# Patient Record
Sex: FEMALE | Race: BLACK OR AFRICAN AMERICAN
[De-identification: names, ages, dates, MRNs, and addresses within clinical notes are randomized per-mention and may not be internally consistent; named-entity substitution may affect disease eponyms.]

---

## 2018-03-19 ENCOUNTER — HOSPITAL ENCOUNTER (INPATIENT)
Dept: HOSPITAL 45 - C.EDB | Age: 63
LOS: 15 days | Discharge: NURSING, REHAB OR LONG-TERM CARE | DRG: 871 | End: 2018-04-03
Attending: FAMILY MEDICINE | Admitting: HOSPITALIST
Payer: COMMERCIAL

## 2018-03-19 VITALS
WEIGHT: 105.16 LBS | BODY MASS INDEX: 17.95 KG/M2 | HEIGHT: 64 IN | WEIGHT: 105.16 LBS | BODY MASS INDEX: 17.95 KG/M2 | BODY MASS INDEX: 17.95 KG/M2 | BODY MASS INDEX: 17.95 KG/M2 | HEIGHT: 64 IN

## 2018-03-19 DIAGNOSIS — I63.9: ICD-10-CM

## 2018-03-19 DIAGNOSIS — D64.9: ICD-10-CM

## 2018-03-19 DIAGNOSIS — E55.9: ICD-10-CM

## 2018-03-19 DIAGNOSIS — I24.8: ICD-10-CM

## 2018-03-19 DIAGNOSIS — G93.41: ICD-10-CM

## 2018-03-19 DIAGNOSIS — A41.51: Primary | ICD-10-CM

## 2018-03-19 DIAGNOSIS — N39.0: ICD-10-CM

## 2018-03-19 DIAGNOSIS — K74.60: ICD-10-CM

## 2018-03-19 DIAGNOSIS — R73.9: ICD-10-CM

## 2018-03-19 DIAGNOSIS — I48.0: ICD-10-CM

## 2018-03-19 DIAGNOSIS — A41.89: ICD-10-CM

## 2018-03-19 DIAGNOSIS — R91.1: ICD-10-CM

## 2018-03-19 DIAGNOSIS — E87.2: ICD-10-CM

## 2018-03-19 DIAGNOSIS — L03.116: ICD-10-CM

## 2018-03-19 DIAGNOSIS — D69.59: ICD-10-CM

## 2018-03-19 DIAGNOSIS — R18.8: ICD-10-CM

## 2018-03-19 DIAGNOSIS — E16.2: ICD-10-CM

## 2018-03-19 DIAGNOSIS — R65.21: ICD-10-CM

## 2018-03-19 DIAGNOSIS — E05.90: ICD-10-CM

## 2018-03-19 DIAGNOSIS — J90: ICD-10-CM

## 2018-03-19 DIAGNOSIS — R56.9: ICD-10-CM

## 2018-03-19 DIAGNOSIS — I42.9: ICD-10-CM

## 2018-03-19 DIAGNOSIS — N17.0: ICD-10-CM

## 2018-03-19 DIAGNOSIS — I50.20: ICD-10-CM

## 2018-03-19 DIAGNOSIS — Z88.2: ICD-10-CM

## 2018-03-19 DIAGNOSIS — E86.0: ICD-10-CM

## 2018-03-19 DIAGNOSIS — I16.0: ICD-10-CM

## 2018-03-19 DIAGNOSIS — I11.0: ICD-10-CM

## 2018-03-19 LAB
ALBUMIN SERPL-MCNC: 2.8 GM/DL (ref 3.4–5)
ALP SERPL-CCNC: 100 U/L (ref 45–117)
ALT SERPL-CCNC: 32 U/L (ref 12–78)
AST SERPL-CCNC: 44 U/L (ref 15–37)
BUN SERPL-MCNC: 23 MG/DL (ref 7–18)
CALCIUM SERPL-MCNC: 9 MG/DL (ref 8.5–10.1)
CO2 SERPL-SCNC: 23 MMOL/L (ref 21–32)
CREAT SERPL-MCNC: 0.94 MG/DL (ref 0.6–1.2)
EOSINOPHIL NFR BLD AUTO: 128 K/UL (ref 130–400)
GLUCOSE SERPL-MCNC: 99 MG/DL (ref 70–99)
HCT VFR BLD CALC: 34.7 % (ref 37–47)
HGB BLD-MCNC: 11.8 G/DL (ref 12–16)
INR PPP: 1.2 (ref 0.9–1.1)
MCH RBC QN AUTO: 28.2 PG (ref 25–34)
MCHC RBC AUTO-ENTMCNC: 34 G/DL (ref 32–36)
MCV RBC AUTO: 82.8 FL (ref 80–100)
PMV BLD AUTO: 11.2 FL (ref 7.4–10.4)
POTASSIUM SERPL-SCNC: 3.5 MMOL/L (ref 3.5–5.1)
PROT SERPL-MCNC: 6.8 GM/DL (ref 6.4–8.2)
PTT PATIENT: 24.7 SECONDS (ref 21–31)
RED CELL DISTRIBUTION WIDTH CV: 12.9 % (ref 11.5–14.5)
RED CELL DISTRIBUTION WIDTH SD: 38.6 FL (ref 36.4–46.3)
SODIUM SERPL-SCNC: 136 MMOL/L (ref 136–145)
WBC # BLD AUTO: 4.36 K/UL (ref 4.8–10.8)

## 2018-03-20 VITALS
TEMPERATURE: 98.24 F | SYSTOLIC BLOOD PRESSURE: 118 MMHG | DIASTOLIC BLOOD PRESSURE: 74 MMHG | OXYGEN SATURATION: 95 % | HEART RATE: 94 BPM

## 2018-03-20 VITALS
HEART RATE: 88 BPM | OXYGEN SATURATION: 96 % | SYSTOLIC BLOOD PRESSURE: 106 MMHG | DIASTOLIC BLOOD PRESSURE: 66 MMHG | TEMPERATURE: 97.7 F

## 2018-03-20 VITALS
OXYGEN SATURATION: 96 % | DIASTOLIC BLOOD PRESSURE: 69 MMHG | SYSTOLIC BLOOD PRESSURE: 103 MMHG | TEMPERATURE: 97.7 F | HEART RATE: 91 BPM

## 2018-03-20 VITALS — DIASTOLIC BLOOD PRESSURE: 71 MMHG | HEART RATE: 139 BPM | SYSTOLIC BLOOD PRESSURE: 103 MMHG

## 2018-03-20 VITALS
HEART RATE: 90 BPM | TEMPERATURE: 97.88 F | DIASTOLIC BLOOD PRESSURE: 63 MMHG | SYSTOLIC BLOOD PRESSURE: 95 MMHG | OXYGEN SATURATION: 96 %

## 2018-03-20 VITALS
SYSTOLIC BLOOD PRESSURE: 95 MMHG | DIASTOLIC BLOOD PRESSURE: 66 MMHG | HEART RATE: 93 BPM | TEMPERATURE: 97.34 F | OXYGEN SATURATION: 99 %

## 2018-03-20 VITALS — OXYGEN SATURATION: 93 % | DIASTOLIC BLOOD PRESSURE: 76 MMHG | HEART RATE: 143 BPM | SYSTOLIC BLOOD PRESSURE: 105 MMHG

## 2018-03-20 VITALS
OXYGEN SATURATION: 96 % | HEART RATE: 135 BPM | DIASTOLIC BLOOD PRESSURE: 67 MMHG | TEMPERATURE: 98.96 F | SYSTOLIC BLOOD PRESSURE: 120 MMHG

## 2018-03-20 VITALS
DIASTOLIC BLOOD PRESSURE: 71 MMHG | OXYGEN SATURATION: 97 % | SYSTOLIC BLOOD PRESSURE: 108 MMHG | HEART RATE: 90 BPM | TEMPERATURE: 98.78 F

## 2018-03-20 VITALS — DIASTOLIC BLOOD PRESSURE: 73 MMHG | SYSTOLIC BLOOD PRESSURE: 107 MMHG

## 2018-03-20 VITALS
DIASTOLIC BLOOD PRESSURE: 66 MMHG | TEMPERATURE: 97.7 F | SYSTOLIC BLOOD PRESSURE: 106 MMHG | HEART RATE: 88 BPM | OXYGEN SATURATION: 96 %

## 2018-03-20 LAB
ALBUMIN SERPL-MCNC: 2.4 GM/DL (ref 3.4–5)
ALP SERPL-CCNC: 58 U/L (ref 45–117)
ALT SERPL-CCNC: 33 U/L (ref 12–78)
AST SERPL-CCNC: 47 U/L (ref 15–37)
BASOPHILS # BLD: 0 K/UL (ref 0–0.2)
BASOPHILS NFR BLD: 0 %
CALCIUM SERPL-MCNC: 8.6 MG/DL (ref 8.5–10.1)
CO2 SERPL-SCNC: 19 MMOL/L (ref 21–32)
CREAT SERPL-MCNC: 1.95 MG/DL (ref 0.6–1.2)
EOS ABS #: 0.03 K/UL (ref 0–0.5)
EOSINOPHIL NFR BLD AUTO: 132 K/UL (ref 130–400)
EOSINOPHIL NFR BLD AUTO: 137 K/UL (ref 130–400)
GLUCOSE SERPL-MCNC: 101 MG/DL (ref 70–99)
HCT VFR BLD CALC: 32.1 % (ref 37–47)
HCT VFR BLD CALC: 34.6 % (ref 37–47)
HEP C IGG  13 YRS+OLDER_RFLX: (no result)
HGB BLD-MCNC: 10.9 G/DL (ref 12–16)
HGB BLD-MCNC: 11.7 G/DL (ref 12–16)
IG#: 0.01 K/UL (ref 0–0.02)
IMM GRANULOCYTES NFR BLD AUTO: 19.7 %
LYMPHOCYTES # BLD: 0.86 K/UL (ref 1.2–3.4)
MCH RBC QN AUTO: 28 PG (ref 25–34)
MCH RBC QN AUTO: 28.1 PG (ref 25–34)
MCHC RBC AUTO-ENTMCNC: 33.8 G/DL (ref 32–36)
MCHC RBC AUTO-ENTMCNC: 34 G/DL (ref 32–36)
MCV RBC AUTO: 82.5 FL (ref 80–100)
MCV RBC AUTO: 83.2 FL (ref 80–100)
MONO ABS #: 0.3 K/UL (ref 0.11–0.59)
MONOCYTES NFR BLD: 6.9 %
NEUT ABS #: 3.16 K/UL (ref 1.4–6.5)
NEUTROPHILS # BLD AUTO: 0.7 %
NEUTROPHILS NFR BLD AUTO: 72.5 %
PMV BLD AUTO: 12 FL (ref 7.4–10.4)
PMV BLD AUTO: 12.1 FL (ref 7.4–10.4)
POTASSIUM SERPL-SCNC: 4.2 MMOL/L (ref 3.5–5.1)
PROT SERPL-MCNC: 5.7 GM/DL (ref 6.4–8.2)
RED CELL DISTRIBUTION WIDTH CV: 13.1 % (ref 11.5–14.5)
RED CELL DISTRIBUTION WIDTH CV: 13.2 % (ref 11.5–14.5)
RED CELL DISTRIBUTION WIDTH SD: 39.6 FL (ref 36.4–46.3)
RED CELL DISTRIBUTION WIDTH SD: 39.9 FL (ref 36.4–46.3)
RETIC COUNT %: 1.6 % (ref 0.5–2)
SODIUM SERPL-SCNC: 135 MMOL/L (ref 136–145)
TRANSFERRIN SERPL-MCNC: 226 MG/DL (ref 200–360)
WBC # BLD AUTO: 16.42 K/UL (ref 4.8–10.8)
WBC # BLD AUTO: 8.57 K/UL (ref 4.8–10.8)

## 2018-03-20 RX ADMIN — METRONIDAZOLE SCH MLS/HR: 500 INJECTION, SOLUTION INTRAVENOUS at 13:54

## 2018-03-20 RX ADMIN — ALBUMIN HUMAN SCH GM: 250 SOLUTION INTRAVENOUS at 23:26

## 2018-03-20 RX ADMIN — SODIUM CHLORIDE SCH MLS/HR: 900 INJECTION, SOLUTION INTRAVENOUS at 14:47

## 2018-03-20 RX ADMIN — TRAMADOL HYDROCHLORIDE PRN MG: 50 TABLET, COATED ORAL at 04:11

## 2018-03-20 RX ADMIN — DOXYCYCLINE SCH MLS/HR: 100 INJECTION, POWDER, LYOPHILIZED, FOR SOLUTION INTRAVENOUS at 20:00

## 2018-03-20 RX ADMIN — ALBUMIN HUMAN SCH GM: 250 SOLUTION INTRAVENOUS at 17:31

## 2018-03-20 RX ADMIN — DOXYCYCLINE SCH MLS/HR: 100 INJECTION, POWDER, LYOPHILIZED, FOR SOLUTION INTRAVENOUS at 08:25

## 2018-03-20 RX ADMIN — ALBUMIN HUMAN SCH GM: 250 SOLUTION INTRAVENOUS at 11:59

## 2018-03-20 RX ADMIN — ACETAMINOPHEN PRN MG: 325 TABLET ORAL at 09:48

## 2018-03-20 RX ADMIN — ACETAMINOPHEN PRN MG: 325 TABLET ORAL at 23:29

## 2018-03-20 RX ADMIN — TRAMADOL HYDROCHLORIDE PRN MG: 50 TABLET, COATED ORAL at 19:12

## 2018-03-20 RX ADMIN — METRONIDAZOLE SCH MLS/HR: 500 INJECTION, SOLUTION INTRAVENOUS at 22:08

## 2018-03-20 NOTE — HISTORY & PHYSICAL EXAMINATION
DATE OF ADMISSION:  03/20/2018

 

PRIMARY CARE PHYSICIAN:  No primary care doctor.

 

CHIEF COMPLAINT:  Left leg pain, swelling, abdominal pain, nausea and

vomiting.

 

HISTORY OF PRESENT ILLNESS:  



History obtained from patient and records.  



Medical history significant for anemia.  



Patient is a resident of [place] who has been staying with her daughter in [facility] 
[facility] 

the last few months.  



She noted left leg swelling yesterday, achy abdominal pain all over, belly more

distended than usual.  Some nausea,  achy headache

symptoms.  No chest pain, increasing shortness of breath.  

Denies diarrhea.

No cough symptoms.

No recent trauma.



Patient brought to the Emergency Room.  

Received Zosyn, Daptomycin and Clindamycin for sepsis. 

 

MEDICAL HISTORY:  As above.

 

SURGERIES:  None as per the patient.

 

HOME MEDICATIONS:  Just vitamin D supplements.

 

ALLERGIES:  SULFA.

 

FAMILY HISTORY:  Hypertension.

 

PERSONAL AND SOCIAL HISTORY:  Nonsmoker, no chronic intake of alcoholic

beverages.  Retired home health aide.  -American ethnicity.

 

REVIEW OF SYSTEMS:  As per HPI, all 10 systems reviewed, all other ROS

negative.

 

PHYSICAL EXAMINATION:

VITAL SIGNS:  Blood pressure was noted to be 159/102, later 100/70, pulse

rate 110 later 90, RR 18, temperature 37.6 O2 98RA

GENERAL:  Noted to be hyposthenic, slightly anxious, no respiratory distress. 

SKIN: Pallor, warm.

HEENT:  Pale palpebral conjunctiva.  Possible exophthalmos.  Dry mucosa.

NECK:  Supple, short.

CHEST:  Decreased effort.  No tenderness.

HEART:  RRR, no murmur.

ABDOMEN:  Some distention, mild tenderness on light palpation .

EXTREMITIES:  LLE edema/induration,  tenderness.  

NEUROLOGIC:  Coherent.  no gross focality .

 

LABORATORY DATA:  Hemoglobin noted to be 11.3, hematocrit 31.7, white cell

count 4.3, platelet count 128.  Sodium noted to be 136, potassium 3.5,

chloride 101, CO2 of 23, BUN 20, creatinine 0.9, glucose 99.  Lactic acid was

noted to be 3.7.  AST 44, ALT 32.

 

CT head, initially read, no acute pathology 



CTA, initial read, no PE, pulmonary hypertension, right heart enlargement, 
anasarca, periportal edema,

fluid overload.  



CT abdomen and pelvis initial read : mild nodularity

of liver contour, cirrhosis, small to moderate amount of ascites,

distended IVC, colonic diverticulosis without evidence of acute

diverticulitis, most colon nondistended, possible

colitis, enteritis, enlarged retroperitoneal nodes.  



CT lower leg, small amount of pelvic fluid, fat stranding left thigh about the 
left knee.  

No abscess or soft tissue gas.

 

EKG as per my interpretation, rate 105, sinus tachycardia, biatrial enlargement
, short [place],

no ischemia





ASSESSMENT:

1.  Severe sepsis

SIRS plus lactic acidosis

possible sources :

left lower extremity cellulitis

GI (colitis, SBP-ascites on CT)

ro UTI



2.  new diagnosis of cirrhosis.

possibly from RSHF, passive congestion (unknown duration)

3.  Hyperthyroidism (possible Graves disease) new diagnosis

4.  Chronic anemia, unknown baseline.

5.  Thrombocytopenia secondary to sepsis, cirrhosis.

  



PLAN:  

GMF

cultures, stool C. diff. 

ff UA 

Doxycycline for cellulitis

Cefepime and Flagyl for abdominal sepsis

IVF,  follow lactic acid 

diagnostic/therapeutic paracentesis

GI consult.  Ascites.  new diagnosis of cirrhosis.  

Initiate Methimazole outpatient Endocrinology follow-up

Anemia workup.  

DVT prophylaxis, SCDs RE thrombocytopenia.  

Full code.  



Attempted to update the patient's daughter, Sid Byrd 

over listed contact number ([phone]).

No response.

 

 

 

MTDD

## 2018-03-20 NOTE — DIAGNOSTIC IMAGING REPORT
L VENOUS DOPP LOWER EXT UNILAT



CLINICAL HISTORY: 62 years-old Female presenting with L leg swelling. 



TECHNIQUE: Real-time grayscale and color and spectral Doppler ultrasound imaging

of the veins of the left lower extremity was performed. Compression and

augmentation were also utilized.



COMPARISON: None.



FINDINGS: 



Left:

Common femoral vein: Patent. Pulsatile waveforms evident, which may relate to

elevated right heart pressure.

Greater saphenous vein: Patent.

Deep femoral vein: Patent.

Femoral vein: Patent.

Popliteal vein: Patent.

Calf veins: Patent.



Other: Several benign-appearing left inguinal lymph nodes noted. These are

likely reactive.



IMPRESSION:

No evidence of deep venous thrombosis.







Electronically signed by:  Tay Koch M.D.

3/20/2018 7:03 AM



Dictated Date/Time:  3/20/2018 7:02 AM

## 2018-03-20 NOTE — GASTROINTESTINAL CONSULTATION
Gastrointestinal Consultation


Date of Consultation:  Mar 20, 2018


Attending Physician:  Matias Lyn


Consulting Physician:  Dione Vela


Reason for Consultation:  Ascites, cirrhosis workup


History of Present Illness


Patient is a 62 year old female currently seen for ascites and possible 

cirrhosis workup. She went to ED with c/o L leg pain and swelling. Also has 

generalized body ache symptoms, nausea, lower abd pain. Denies any bowel habit 

changes. Upon eval was noted to be febrile, L leg suspected to have cellulitis. 

Her blood culture is growing gram negative bacilli. She is currently started 

already on Cefepime, Flagyl, Doxycycline. She had imaging studies including CT 

chest/abd/pelvis which is concerning for R heart congestion, pulmonary HTN, 

anasarca, abd ascites. ? CT hypoperfusion complex suspected. There are also 

enlargd retroperitoneal and pelvic lymph nodes unclear etiology and may be 

related to congestive change or malignancy/lymphoproliferative disease. 





Pt is a limited historian. Moved several years ago to [place] and lives 

with daughter. She used to be a [place] resident but lost insurance and hasn't had 

medical care for years. All she could tell me is that she used to have a 

Thyroid disorder and given medicine for this. Her labs showed TSH of <0.005, T4 

4.35, T3 0.49. She is on Methamazole now. She denies any hx of liver disease, 

autoimmune/hereditary liver diseases. Denies any hx of illicit drugs, tattoos, 

blood transfusion, ETOH abuse. Acute hepatitis panel so far negative for Hep B 

and C. LFTs showed Tbili 2, AST 44, ALT 32, . Liver on CT imaging normal 

in appearance. There was an order for diagnostic paracentesis but not enough 

ascites to tap.





Past Medical/Surgical History


Medical Problems:


(1) Left leg cellulitis


Status: Acute  





(2) Left leg pain


Status: Acute  





(3) Sepsis


Status: Acute  








Past Medical History:


Hyperthyroid


Past Surgical History:


None





Social History


Smoking Status:  Never Smoker


Alcohol Use:  none


Drug Use:  none


Marital Status:  single


Housing Status:  lives with family





Allergies


Coded Allergies:  


     Sulfa Antibiotics (Verified  Allergy, Unknown, swelling, 3/19/18)





Current Medications





Home Meds and Scripts








 Medications  Dose


 Route/Sig


 Max Daily Dose Days Date Category


 


 No Active


 Prescriptions or


 Reported


 Medications  


 


     Rx











Review of Systems


Constitutional:  + fever, No chills


Respiratory:  No cough, No shortness of breath


Cardiac:  No chest pain


Abdomen:  + pain, + nausea, No vomiting, No diarrhea


Skin:  No rash, No itch, No jaundice





Physical Exam











  Date Time  Temp Pulse Resp B/P (MAP) Pulse Ox O2 Delivery O2 Flow Rate FiO2


 


3/20/18 12:01 36.6 90 22 95/63 (74) 96 Room Air  


 


3/20/18 08:41    107/73 (84)    


 


3/20/18 08:00      Room Air  


 


3/20/18 05:19 37.1 90 16 108/71 (83) 97 Room Air  


 


3/20/18 03:53 36.3 93 24 95/66 99 Room Air  


 


3/20/18 02:55  86 16 110/66 98   


 


3/20/18 02:09  91      


 


3/20/18 02:00  90 18 118/83 98 Room Air  


 


3/20/18 01:00  95 18 121/80 96 Room Air  


 


3/20/18 00:30  98 18 127/84 97 Room Air  


 


3/20/18 00:00 36.8 97 18 125/74 97 Room Air  


 


3/19/18 23:30  102 20 126/75 99 Room Air  


 


3/19/18 23:05  106  154/92 100 Room Air  


 


3/19/18 22:46     99 Room Air  


 


3/19/18 22:46  110      


 


3/19/18 22:34 37.6 112  159/102 98 Room Air  








General Appearance:  WD/WN, no apparent distress


Eyes:  normal inspection, PERRL, EOMI


Neck:  supple, no JVD, trachea midline


Respiratory/Chest:  normal breath sounds, no respiratory distress, no accessory 

muscle use


Cardiovascular:  regular rate, rhythm, no gallop, no murmur


Abdomen:  normal bowel sounds, + distended (mild), + tenderness (mid lower abd 

area )


Extremities:  normal inspection, no pedal edema


Neurologic/Psych:  alert, normal mood/affect, oriented x 3


Skin:  normal color, no jaundice, no rash





Laboratory Results





Last 24 Hours








Test


  3/19/18


22:45 3/19/18


22:56 3/19/18


23:44 3/20/18


00:30


 


Prothrombin Time 13.0 SECONDS    


 


Prothromb Time International


Ratio 1.2 


  


  


  


 


 


Activated Partial


Thromboplast Time 24.7 SECONDS 


  


  


  


 


 


Partial Thromboplastin Ratio 1.0    


 


Sodium Level 136 mmol/L    


 


Potassium Level 3.5 mmol/L    


 


Chloride Level 101 mmol/L    


 


Carbon Dioxide Level 23 mmol/L    


 


Anion Gap 12.0 mmol/L    


 


Blood Urea Nitrogen 23 mg/dl    


 


Creatinine 0.94 mg/dl    


 


Est Creatinine Clear Calc


Drug Dose 49.2 ml/min 


  


  


  


 


 


Estimated GFR (


American) 76.4 


  


  


  


 


 


Estimated GFR (Non-


American 65.9 


  


  


  


 


 


BUN/Creatinine Ratio 24.7    


 


Random Glucose 99 mg/dl    


 


Calcium Level 9.0 mg/dl    


 


Magnesium Level 1.7 mg/dl    


 


Total Bilirubin 2.0 mg/dl    


 


Aspartate Amino Transf


(AST/SGOT) 44 U/L 


  


  


  


 


 


Alanine Aminotransferase


(ALT/SGPT) 32 U/L 


  


  


  


 


 


Alkaline Phosphatase 100 U/L    


 


Total Protein 6.8 gm/dl    


 


Albumin 2.8 gm/dl    


 


Globulin 4.0 gm/dl    


 


Albumin/Globulin Ratio 0.7    


 


Chemistry Specimen Hemolysis     


 


Bedside Lactic Acid Venous  5.21 mmol/L   


 


White Blood Count   4.36 K/uL  


 


Red Blood Count   4.19 M/uL  


 


Hemoglobin   11.8 g/dL  


 


Hematocrit   34.7 %  


 


Mean Corpuscular Volume   82.8 fL  


 


Mean Corpuscular Hemoglobin   28.2 pg  


 


Mean Corpuscular Hemoglobin


Concent 


  


  34.0 g/dl 


  


 


 


Platelet Count   128 K/uL  


 


Mean Platelet Volume   11.2 fL  


 


Neutrophils (%) (Auto)   72.5 %  


 


Lymphocytes (%) (Auto)   19.7 %  


 


Monocytes (%) (Auto)   6.9 %  


 


Eosinophils (%) (Auto)   0.7 %  


 


Basophils (%) (Auto)   0.0 %  


 


Neutrophils # (Auto)   3.16 K/uL  


 


Lymphocytes # (Auto)   0.86 K/uL  


 


Monocytes # (Auto)   0.30 K/uL  


 


Eosinophils # (Auto)   0.03 K/uL  


 


Basophils # (Auto)   0.00 K/uL  


 


RDW Standard Deviation   38.6 fL  


 


RDW Coefficient of Variation   12.9 %  


 


Immature Granulocyte % (Auto)   0.2 %  


 


Immature Granulocyte # (Auto)   0.01 K/uL  


 


Toxic Vacuolation   3+  


 


Dohle Bodies   1+  


 


Large Platelets   1+  


 


Echinocytes   1+  


 


Transferrin % Saturation     % 


 


Test


  3/20/18


00:50 3/20/18


05:56 3/20/18


05:57 3/20/18


07:50


 


Absolute Reticulocyte Count 0.06 10^6/uL    


 


Percent Reticulocyte Count 1.6 %    


 


Lactic Acid Level 3.7 mmol/L  3.3 mmol/L   


 


Iron Level  mcg/dl   16 mcg/dl  


 


Total Iron Binding Capacity 290 mcg/dl    


 


Transferrin 226 mg/dl    


 


Ferritin 74.9 ng/ml    


 


Total Creatine Kinase 555 U/L   514 U/L  


 


Vitamin B12 Level 184 pg/mL    


 


Folate 13.65 ng/mL    


 


Procalcitonin 73.85 ng/ml    


 


Thyroid Stimulating Hormone


(TSH) < 0.005 uIu/ml 


  


  


  


 


 


Free Thyroxine 3.45 ng/dl    


 


Chemistry Specimen Hemolysis     


 


Ethyl Alcohol mg/dL < 3.0 mg/dl    


 


White Blood Count   8.57 K/uL  


 


Red Blood Count   4.16 M/uL  


 


Hemoglobin   11.7 g/dL  


 


Hematocrit   34.6 %  


 


Mean Corpuscular Volume   83.2 fL  


 


Mean Corpuscular Hemoglobin   28.1 pg  


 


Mean Corpuscular Hemoglobin


Concent 


  


  33.8 g/dl 


  


 


 


Platelet Count   132 K/uL  


 


Mean Platelet Volume   12.1 fL  


 


RDW Standard Deviation   39.6 fL  


 


RDW Coefficient of Variation   13.1 %  


 


Neutrophils % (Manual)   82.4 %  


 


Lymphocytes % (Manual)   12.3 %  


 


Monocytes % (Manual)   5.3 %  


 


Neutrophils # (Manual)   7.06 K/uL  


 


Total Absolute Neutrophils   7.06 K/uL  


 


Lymphocytes # (Manual)   1.05 K/uL  


 


Total Absolute Lymphocytes   1.05 K/uL  


 


Monocytes # (Manual)   0.45 K/uL  


 


Toxic Vacuolation   3+  


 


Echinocytes   1+  


 


Ammonia   < 10.0 umol/L  


 


Lipase   56 U/L  


 


Total Triiodothyronine   0.49 ng/ml  


 


Hepatitis B Surface Antigen   NEG  


 


Hepatitis C Antibody   NEG  


 


Urine Color    DK YELLOW 


 


Urine Appearance    CLEAR 


 


Urine pH    5.0 


 


Urine Specific Gravity    > 1.045 


 


Urine Protein    2+ 


 


Urine Glucose (UA)    NEG 


 


Urine Ketones    TRACE 


 


Urine Occult Blood    TRACE 


 


Urine Nitrite    NEG 


 


Urine Bilirubin    NEG 


 


Urine Urobilinogen    NEG 


 


Urine Leukocyte Esterase    NEG 


 


Urine WBC (Auto)    5-10 /hpf 


 


Urine RBC (Auto)    0-4 /hpf 


 


Urine Hyaline Casts (Auto)    5-10 /lpf 


 


Urine Epithelial Cells (Auto)    >30 /lpf 


 


Urine Bacteria (Auto)    NEG 


 


Test


  3/20/18


12:03 


  


  


 











Impression


Patient is a 62 year old female currently admitted for sepsis (blood cx growing 

gram negative bacilli) ? L leg cellulitis related. She was suspected to have 

cirrhosis given ascites finding on CT scan though this is likely related to R 

heart congestion, pulmonary HTN. Ascites is too small in amt to perform 

diagnostic paracentesis. Liver appears normal in CT scan, and LFT were only 

mildly elevated (Tbili 2, AST 42). Plt and INR borderline abnormal. Doubt she 

has cirrhosis.





Plan


- F/U infectious workup; defer antibiotic management to primary team


- Will f/u stool studies if obtained for diarrhea. 


- Recommend consulting Cardiology for R heart congestion, pulmonary HTN, volume 

overload. 


- Recommend consulting Endocrinology for Hyperthyroidism. 











I have seen and examined the patient with AVEL Peguero whose note 

reflects our findings and plan.  Volume overload likely secondary to right heart

/pulm HTN.  Awaiting stool studies.

## 2018-03-20 NOTE — DIAGNOSTIC IMAGING REPORT
ASCITES-ABDOMEN LIMITED



HISTORY:  62 years-old Female ascites ultrasound of the abdomen to assess for

ascites.



COMPARISON: CT abdomen and pelvis 3/20/2018



TECHNIQUE: Multiple real-time sonographic images of the abdomen were obtained

assessing for ascites.



FINDINGS/IMPRESSION: 

There is only a trace amount amount of ascites seen throughout the abdomen and

pelvis without drainable fluid collections identified.









The above report was generated using voice recognition software. It may contain

grammatical, syntax or spelling errors.







Electronically signed by:  Mike Cain M.D.

3/20/2018 9:24 AM



Dictated Date/Time:  3/20/2018 9:21 AM

## 2018-03-20 NOTE — DIAGNOSTIC IMAGING REPORT
ABD/PELVIS IV CONTRAST ONLY



CLINICAL HISTORY: 62 years-old Female presenting with abd pain. 



TECHNIQUE: Multidetector CT of the abdomen and pelvis was performed after the

administration of intravenous contrast. IV contrast: 119 mL of Optiray 320. A

dose lowering technique was used consistent with the principles of ALARA (as low

as reasonably achievable). 



COMPARISON: None.



CT DOSE (mGy.cm): The estimated cumulative dose is 470.21 mGy.cm.



FINDINGS:



 topogram: Cardiomegaly.



Lung bases: Lungs and pleural spaces clear. Enlarged right atrium and right

ventricle. No pericardial or pleural effusion. 



Liver: Normal morphology. Extensive periportal edema. No focal lesion. The liver

is borderline enlarged. Patent hepatic vasculature. Engorgement of the

intrahepatic IVC and hepatic veins.



Biliary: No gross evidence of biliary ductal dilatation of the presence of

severe periportal edema makes evaluation difficult. Normal gallbladder.



Pancreas: Mild parenchymal atrophy. Mild prominence of the pancreatic duct.

Suggestion of pancreas divisum. No gross evidence of a pancreatic head mass.



Spleen: Normal.



Adrenal glands: Intensely hyperemic adrenal glands.



Kidneys and ureters: Hypodensity in the left kidney likely simple cyst. No

hydronephrosis. Ureters poorly visualized secondary to diffuse edema.



Bladder: Normal.



Pelvic organs: Globular uterine fundus suggest the presence of fibroids. Ovaries

somewhat prominent for age.



Bowel: Mild diffuse wall thickening of the colon. Mucosal hyperenhancement of

the stomach with mildly diffuse thickened gastric wall. The duodenum also

demonstrates mucosal hyperenhancement in the slightly thick wall as do few

additional loops of small bowel more distally. No bowel obstruction.



Peritoneal cavity: Small amount of abdominal pelvic ascites, which is simple

appearing. No intraperitoneal free gas. Diffuse mesenteric edema.



Lymph nodes: Several enlarged bilateral external iliac lymph nodes, the largest

on the left measuring 9 mm in the short axis (series 6 image 328). Enlarged

bilateral common iliac lymph nodes, again the largest on the left measuring 10

mm in the short axis (series 6 image 247). Borderline enlarged pericaval and

periaortic lymph nodes.



Vasculature: Aorta and IVC patent and normal in caliber.



Abdominal wall: Anasarca.



Musculoskeletal: Normal.



IMPRESSION:

1.  Bilateral adrenal hyperenhancement can be seen in the setting of the CT

hypoperfusion complex (shock bowel). CT hypoperfusion complex further evidenced

by abdominal pelvic ascites, mesenteric edema, periportal edema, ascites, and

evidence of right heart failure. Mild bowel wall thickening with mucosal

hyperemia would also be compatible with hypoperfusion.



2.  Enlarged retroperitoneal and pelvic lymph nodes, which are of uncertain

etiology. Correlate clinically and consider follow-up to resolution. These may

be reactive or related to congestive change and less there is a history of

underlying malignancy or lymphoproliferative disease.



The report will be called/faxed according to standard departmental protocol.











Electronically signed by:  Tay Koch M.D.

3/20/2018 7:52 AM



Dictated Date/Time:  3/20/2018 6:48 AM

## 2018-03-20 NOTE — ECHOCARDIOGRAM REPORT
*NOTICE TO RECEIVING PARTY AGENCY**  This information is strictly Confidential and protected under 
[place] law.  [place] law prohibits you from making any further disclosure of this 
information unless further disclosure is expressly permitted by the written consent of the person to 
whom it pertains or is authorized by law.  A general authorization for the release of medical or 
other information is not sufficient for this purpose.  Hospital accepts no responsibility if the 
information is made available to any other person, INCLUDING THE PATIENT.



Interpretation Summary

  *  Name: KATHERIN DAVID  Study Date: 2018 03:42 PM  BP: 95/63 mmHg

  *  MRN: [id number]  Patient Location[facility]  HR: 90

  *  : 1955 (M/d/yyyy)  Gender: Female  Height: 64 in

  *  Age: 62 yrs  Ethnicity: AA  Weight: 108 lb

  *  Ordering Physician: Riki

  *  Performed By: Shraddha Malagon RCS

  *  Accession# [id number]  Account# [id number]

  *  Reason For Study: CHF

  *  BSA: 1.5 m2

  *  The study was technically adequate.

  *  There is no comparison study available.

  *  -- Conclusions --

  *  The right ventricle is moderate to severely dilated.

  *  The right ventricular systolic function is severely reduced.

  *  The right atrium is severely dilated.

  *  There is trace tricuspid regurgitation.

  *  Flattened septum is consistent with RV volume overload.

  *  Left ventricular systolic function is moderately reduced.

  *  Ejection Fraction = 35-40%.

  *  Moderate global hypokinesis of the left ventricle.

  *  The inferior vena cava is severely dilated.

  *  Trivial loculated posterior pericardial effusion.

  *  There are no echocardiographic indications of cardiac tamponade.

Procedure Details

  *  A complete two-dimensional transthoracic echocardiogram was performed (2D, M-mode, Doppler and 
color flow Doppler).

Left Ventricle

  *  The left ventricle is normal in size.

  *  Left ventricular systolic function is moderately reduced.

  *  Ejection Fraction = 35-40%.

  *  Flattened septum is consistent with RV volume overload.

  *  Moderate global hypokinesis of the left ventricle.

Right Ventricle

  *  The right ventricle is moderate to severely dilated.

  *  The right ventricular systolic function is severely reduced.

Atria

  *  The left atrial size is normal.

  *  The right atrium is severely dilated.

Mitral Valve

  *  The mitral valve anatomy is normal.

  *  There is no mitral valve stenosis.

  *  Significant mitral regurgitation is absent.

Tricuspid Valve

  *  The tricuspid valve anatomy is normal.

  *  There is no tricuspid stenosis.

  *  There is trace tricuspid regurgitation.

Aortic Valve

  *  The aortic valve is trileaflet.

  *  The aortic valve opens well.

  *  No hemodynamically significant valvular aortic stenosis.

  *  There is no significant aortic regurgitation.

Pulmonic Valve

  *  The pulmonary valve is inadequately visualized, but the Doppler data is adequate for 
interpretation.

  *  There is no pulmonic valvular stenosis.

  *  Trace pulmonic valvular regurgitation.

Great Vessels

  *  The aortic root is normal size.

Pericardium/Pleural

  *  Trivial loculated posterior pericardial effusion.

  *  There are no echocardiographic indications of cardiac tamponade.

Great Vessels

  *  The inferior vena cava is severely dilated.

Left Ventricular Diastolic Function

  *  Diastolic dysfunction, Grade II (pseudonormalization pattern).



MMode 2D Measurements and Calculations

IVSd 1.0 cm

IVSs 1.0 cm



LVIDd 4.3 cm

LVIDs 3.4 cm

LVPWd 1.1 cm

LVPWs 1.1 cm



IVS/LVPW 0.93 

FS 20.2 %

EDV(Teich) 81.7 ml

ESV(Teich) 47.8 ml

EF(Teich) 41.6 %



EDV(cubed) 77.9 ml

ESV(cubed) 39.6 ml

EF(cubed) 49.1 %

% IVS thick 2.0 %

% LVPW thick 6.4 %





LV mass(C)d 148.0 grams

LV mass(C)dI 98.3 grams/m\S\2

LV mass(C)s 111.4 grams

LV mass(C)sI 74.0 grams/m\S\2



SV(Teich) 34.0 ml

SI(Teich) 22.6 ml/m\S\2

SV(cubed) 38.3 ml

SI(cubed) 25.4 ml/m\S\2



Ao root diam 3.0 cm

Ao root area 7.2 cm\S\2

ACS 1.4 cm

LA dimension 3.0 cm



asc Aorta Diam 2.5 cm





LA/Ao 0.99 



EDV(MOD-sp4) 73.0 ml

ESV(MOD-sp4) 47.0 ml

EF(MOD-sp4) 35.6 %



EDV(MOD-sp2) 105.0 ml

ESV(MOD-sp2) 68.0 ml

EF(MOD-sp2) 35.2 %



SV(MOD-sp4) 26.0 ml

SI(MOD-sp4) 17.3 ml/m\S\2





SV(MOD-sp2) 37.0 ml

SI(MOD-sp2) 24.6 ml/m\S\2













Doppler Measurements and Calculations

MV E max roxane 69.3 cm/sec

MV A max roxane 80.9 cm/sec



MV E/A 0.86 



MV P1/2t max roxane 98.7 cm/sec

MV P1/2t 42.6 msec

MVA(P1/2t) 5.2 cm\S\2

MV dec slope 678.0 cm/sec\S\2

MV dec time 0.11 sec



Ao V2 max 103.0 cm/sec

Ao max PG 4.2 mmHg

Ao max PG (full) 0.38 mmHg





LV V1 max PG 3.9 mmHg



LV V1 max 98.2 cm/sec



PA V2 max 63.4 cm/sec

PA max PG 1.6 mmHg



TR max roxane 212.0 cm/sec

## 2018-03-20 NOTE — DIAGNOSTIC IMAGING REPORT
(CHEST FOR PE) ANGIO WITH



CLINICAL HISTORY: 62 years-old Female presenting with shortness of breath,

abdominal pain. 



TECHNIQUE: Multidetector CT angiography of the chest was performed after

administration of intravenous contrast. 3-D volumetric and/or maximum intensity

projection (MIP) images were subsequently reconstructed for review. IV contrast:

119 mL of Optiray 320. A dose lowering technique was used consistent with the

principles of ALARA (as low as reasonably achievable).



COMPARISON: Chest x-ray performed earlier the same day.



CT DOSE (mGy.cm): The estimated cumulative dose is 470.21 inclusive of the CT

abdomen and pelvis.



FINDINGS:



 topogram: Cardiomegaly



Pulmonary vasculature:



The study is adequate for assessment of the pulmonary vascular tree. No filling

defect within the pulmonary arteries to suggest embolus. Main pulmonary artery

is not significantly enlarged though the segmental and subsegmental pulmonary

arteries are slightly enlarged relative to their adjacent bronchi. No flattening

of the interventricular septum. No intracardiac filling defect. Reflux of

contrast into the intrahepatic IVC.



Remaining chest:



On soft tissue windows, normal thyroid and thoracic inlet. No axillary,

supraclavicular, hilar, or mediastinal lymphadenopathy. Atherosclerosis of the

aorta. Right atrial and right ventricular enlargement. No pericardial or pleural

effusion. Diffuse periportal edema and trace perihepatic ascites. Anasarca

suggested.



On lung windows, minimal subpleural nodularity along the anterolateral right

upper lobe (series 4 image 233). No other focal infiltrate or nodule. Central

airways patent.



On bone windows, normal osseous structures.



IMPRESSION:

1.  No evidence of pulmonary embolus. No acute intrathoracic pathology. Minimal

subpleural nodularity in the right upper lobe, possibly scarring or atypical

atelectasis.



2.  Pulmonary arterial enlargement could suggest pulmonary hypertension.



3.  Right heart enlargement.



4.  Anasarca, periportal edema, and trace ascites. This suggests volume

overload.







Electronically signed by:  Tay Koch M.D.

3/20/2018 7:21 AM



Dictated Date/Time:  3/20/2018 6:48 AM

## 2018-03-20 NOTE — CARDIOLOGY CONSULTATION
DATE OF CONSULTATION:  03/20/2018

 

REFERRING PHYSICIAN:  Dr. Lyn.

 

REASON FOR CONSULTATION:  Right heart failure.

 

CHIEF COMPLAINT ON ADMISSION:  Left-sided leg pain, knee swelling, nausea,

vomiting, and fevers.

 

HISTORY OF PRESENT ILLNESS:  Ms. Calixto is a 62-year-old female who

originally originates from [place].  She presented to the Emergency

Department today with left lower extremity pain, fevers, and knee swelling. 

She was found to have left lower extremity cellulitis, and gram negative

bacteremia.  CT performed demonstrating right ventricular enlargement, right

atrial enlargement, signs of right-sided heart failure.  The patient denies

any prior history of cardiac disease.  She has had limited medical care for

more than 10 years.  There is a chart history of thyroid disease.  She

reports intermittent palpitations.  Left lower extremity discomfort has been

present for more than 5 days.  Denies orthopnea or PND.  Notes chronic

dyspnea on exertion which has not recently changed.  These symptoms have been

present for many years.  She is somewhat of a poor historian.  Does not take

any medications daily as an outpatient.  There is no prior testing available

for review.

 

REVIEW OF SYSTEMS:  The pertinent positive noted above, a comprehensive

10-system review is otherwise negative.

 

PAST MEDICAL HISTORY:  Thyroid disease.

 

PAST SURGICAL HISTORY:  Denied.

 

FAMILY HISTORY:  Negative for premature CAD or sudden cardiac death.

 

SOCIAL HISTORY:  She is single and lives in assisted living.  Denies alcohol

or tobacco use.

 

ALLERGIES:  SULFA.

 

HOME MEDICATIONS:  None.

 

DATA:  ECG on admission - sinus tachycardia with biatrial enlargement, left

anterior fascicular block, nonspecific T-wave abnormality, and left

ventricular hypertrophy.

 

IMAGING:  CTA of the chest:  No evidence of pulmonary embolus.  Pulmonary

arterial enlargement suggesting possible pulmonary hypertension, right heart

enlargement, anasarca, trace ascites, periportal edema.

 

CT of the abdomen and pelvis - bowel wall thickening, mucosal hyperemia,

bilateral adrenal enhancement seen in the setting of CT hypoperfusion

complex.  CT hypoperfusion complex further evidenced by abdominal pelvic

ascites, mesenteric edema, periportal edema, ascites, evidence of right heart

failure and large retroperitoneal and pelvic lymph nodes.

 

Abdominal ultrasound:  Trace amount of ascites.

 

LABORATORY DATA:  Sodium 136, potassium 3.5, chloride 101, CO2 23, BUN is 23,

and creatinine 0.94.  Procalcitonin 73.85.

 

Ethyl alcohol undetectable.

 

Urinalysis:  Negative nitrites, negative leukocyte esterase, concentrated.

 

White blood cell count 8.57, hemoglobin 11.7, and platelet count is 132.

 

Hepatitis B surface antigen negative, hepatitis C antibody negative.  PT

13.0, INR 1.2.

 

PHYSICAL EXAMINATION:

VITAL SIGNS:  Temperature is 36.5 degrees centigrade, pulse is 90 beats per

minute and regular, respiratory rate 20 breaths per minute, blood pressure

103/69 and SaO2 is 96% on room air.

GENERAL:  NAD; awake, alert and oriented x3.

HEENT:  Mucous membranes are dry.  No scleral icterus.  Conjunctivae pink.

NECK:  Supple.  Elevated jugular venous distention noted.

HEART:  Regular with a normal S1 and S2.  RV heave is noted on exam.  There

is no murmur appreciated.

LUNGS:  Demonstrate clear breath sounds without rales, rhonchi, or wheeze.

ABDOMEN:  Mildly distended.  There is no rebound or guarding, the abdomen is

nontender.  Hypoactive bowel sounds noted.

EXTREMITIES:  Demonstrate left lower extremity edema and induration.  Left

lower extremity is tender to palpation.

NEUROLOGIC:  No focal motor deficit.

 

FINAL IMPRESSION:

1. A 62-year-old female admitted with left lower extremity cellulitis, and

severe sepsis.

2. CT evidence of right heart enlargement with clinical signs and symptoms of

right ventricular heart failure.

3. History of thyroid disease with undetectable free T4.

4. Lactic acidosis related to left lower extremity cellulitis and severe

sepsis.

 

PLAN AND RECOMMENDATIONS:  The patient appears to have evidence of right

ventricular dysfunction and right-sided heart failure, per testing and

physical exam.  She also demonstrates severe sepsis related to left lower

extremity cellulitis.  At this time, her urine output has been low.  Agree

with gentle hydration at this time.  She may need a small dose of IV diuretic

therapy as well.  In the future, I would consider loop diuretic and

Aldactone.  A resting 2D transthoracic echocardiogram has been ordered with

results pending at this time, which will help to guide further

therapy.  Recommend the patient be placed on telemetry to monitor for

paroxysmal dysrhythmias. We will continue to follow closely during 
hospitalization.

 

 

 

[facility]

## 2018-03-20 NOTE — DIAGNOSTIC IMAGING REPORT
L LOWER EXTREMITY WITHOUT



CT DOSE: 323.06 mGy.cm



HISTORY: Pain. Edema.  poss nec fasc left thigh



TECHNIQUE: Multiaxial CT images of the     were performed and reformatted in the

sagittal and coronal plane without the use of contrast.  A dose lowering

technique was utilized adhering to the principles of ALARA.



COMPARISON:  None.



FINDINGS: Generalized soft tissue edema about the subcutaneous and to a lesser

extent the soft tissues of left thigh. Appearance is nonspecific. Cellulitis is

not excluded.



No evidence for drainable abscess or collection is present based on the

unenhanced exam. Osseous structures show no acute abnormality.



IMPRESSION:  

Generalized diffuse soft tissue edematous change and/or cellulitis. No evidence

for abscess or collection. Similar findings may be present on the right that

region was not specifically scanned











The above report was generated using voice recognition software.  It may contain

grammatical, syntax or spelling errors.







Electronically signed by:  Edgar Rudd M.D.

3/20/2018 6:59 AM



Dictated Date/Time:  3/20/2018 6:55 AM

## 2018-03-20 NOTE — DIAGNOSTIC IMAGING REPORT
HEAD WITHOUT CONTRAST (CT)



CLINICAL HISTORY: 62 years-old Female presenting with ha. 



TECHNIQUE: Multidetector CT imaging of the head was performed without the use of

intravenous contrast. IV contrast: None. A dose lowering technique was used

consistent with the principles of ALARA (as low as reasonably achievable). 



COMPARISON: None.



CT DOSE (mGy.cm): The estimated cumulative dose is 537.48 mGy.cm.



FINDINGS: 



 topogram: Unremarkable.



Ventricles and sulci normal in size. Brain parenchyma normal in appearance with

preserved gray-white differentiation. No mass effect or midline shift. No

hemorrhage or acute territorial infarct. No extra-axial fluid collection.

Paranasal sinuses and mastoid air cells clear. Calvarium intact. Gas noted

within the cavernous sinuses and soft tissues of the face, likely related to

injection technique/IV catheter.



IMPRESSION:

1.  No acute intracranial abnormality. 



2.  Gas within the cavernous sinuses and soft tissues of the face, also likely

intravascular, likely related to injection technique/IV catheter.







Electronically signed by:  Tay Koch M.D.

3/20/2018 7:24 AM



Dictated Date/Time:  3/20/2018 6:47 AM

## 2018-03-21 VITALS — OXYGEN SATURATION: 94 % | HEART RATE: 118 BPM

## 2018-03-21 VITALS — HEART RATE: 66 BPM | OXYGEN SATURATION: 95 % | SYSTOLIC BLOOD PRESSURE: 122 MMHG | DIASTOLIC BLOOD PRESSURE: 69 MMHG

## 2018-03-21 VITALS — SYSTOLIC BLOOD PRESSURE: 89 MMHG | OXYGEN SATURATION: 95 % | DIASTOLIC BLOOD PRESSURE: 68 MMHG | HEART RATE: 132 BPM

## 2018-03-21 VITALS — SYSTOLIC BLOOD PRESSURE: 118 MMHG | DIASTOLIC BLOOD PRESSURE: 66 MMHG | HEART RATE: 58 BPM

## 2018-03-21 VITALS — OXYGEN SATURATION: 94 % | DIASTOLIC BLOOD PRESSURE: 62 MMHG | SYSTOLIC BLOOD PRESSURE: 116 MMHG | HEART RATE: 61 BPM

## 2018-03-21 VITALS
DIASTOLIC BLOOD PRESSURE: 63 MMHG | OXYGEN SATURATION: 87 % | HEART RATE: 56 BPM | TEMPERATURE: 98.06 F | SYSTOLIC BLOOD PRESSURE: 107 MMHG

## 2018-03-21 VITALS — HEART RATE: 131 BPM | SYSTOLIC BLOOD PRESSURE: 103 MMHG | DIASTOLIC BLOOD PRESSURE: 69 MMHG

## 2018-03-21 VITALS — HEART RATE: 61 BPM | OXYGEN SATURATION: 100 % | DIASTOLIC BLOOD PRESSURE: 61 MMHG | SYSTOLIC BLOOD PRESSURE: 107 MMHG

## 2018-03-21 VITALS
DIASTOLIC BLOOD PRESSURE: 55 MMHG | HEART RATE: 125 BPM | SYSTOLIC BLOOD PRESSURE: 91 MMHG | TEMPERATURE: 98.06 F | OXYGEN SATURATION: 94 %

## 2018-03-21 VITALS — HEART RATE: 69 BPM | DIASTOLIC BLOOD PRESSURE: 58 MMHG | SYSTOLIC BLOOD PRESSURE: 74 MMHG

## 2018-03-21 VITALS — DIASTOLIC BLOOD PRESSURE: 58 MMHG | OXYGEN SATURATION: 100 % | HEART RATE: 61 BPM | SYSTOLIC BLOOD PRESSURE: 95 MMHG

## 2018-03-21 VITALS — DIASTOLIC BLOOD PRESSURE: 64 MMHG | SYSTOLIC BLOOD PRESSURE: 102 MMHG | HEART RATE: 59 BPM

## 2018-03-21 VITALS — HEART RATE: 61 BPM | OXYGEN SATURATION: 100 % | DIASTOLIC BLOOD PRESSURE: 58 MMHG | SYSTOLIC BLOOD PRESSURE: 95 MMHG

## 2018-03-21 VITALS — DIASTOLIC BLOOD PRESSURE: 62 MMHG | SYSTOLIC BLOOD PRESSURE: 114 MMHG | HEART RATE: 55 BPM | OXYGEN SATURATION: 94 %

## 2018-03-21 VITALS
SYSTOLIC BLOOD PRESSURE: 103 MMHG | OXYGEN SATURATION: 93 % | HEART RATE: 142 BPM | TEMPERATURE: 98.96 F | DIASTOLIC BLOOD PRESSURE: 69 MMHG

## 2018-03-21 VITALS — HEART RATE: 60 BPM | OXYGEN SATURATION: 99 % | SYSTOLIC BLOOD PRESSURE: 114 MMHG | DIASTOLIC BLOOD PRESSURE: 62 MMHG

## 2018-03-21 VITALS
OXYGEN SATURATION: 100 % | TEMPERATURE: 97.7 F | SYSTOLIC BLOOD PRESSURE: 123 MMHG | DIASTOLIC BLOOD PRESSURE: 72 MMHG | HEART RATE: 63 BPM

## 2018-03-21 VITALS — HEART RATE: 57 BPM | DIASTOLIC BLOOD PRESSURE: 63 MMHG | SYSTOLIC BLOOD PRESSURE: 110 MMHG | OXYGEN SATURATION: 100 %

## 2018-03-21 VITALS — OXYGEN SATURATION: 95 % | HEART RATE: 71 BPM

## 2018-03-21 VITALS — DIASTOLIC BLOOD PRESSURE: 64 MMHG | OXYGEN SATURATION: 76 % | SYSTOLIC BLOOD PRESSURE: 90 MMHG | HEART RATE: 63 BPM

## 2018-03-21 VITALS — SYSTOLIC BLOOD PRESSURE: 105 MMHG | HEART RATE: 66 BPM | DIASTOLIC BLOOD PRESSURE: 63 MMHG

## 2018-03-21 VITALS — HEART RATE: 90 BPM | SYSTOLIC BLOOD PRESSURE: 117 MMHG | DIASTOLIC BLOOD PRESSURE: 70 MMHG

## 2018-03-21 VITALS — SYSTOLIC BLOOD PRESSURE: 123 MMHG | OXYGEN SATURATION: 96 % | DIASTOLIC BLOOD PRESSURE: 74 MMHG | HEART RATE: 65 BPM

## 2018-03-21 VITALS — HEART RATE: 58 BPM | SYSTOLIC BLOOD PRESSURE: 110 MMHG | OXYGEN SATURATION: 100 % | DIASTOLIC BLOOD PRESSURE: 68 MMHG

## 2018-03-21 VITALS
HEART RATE: 63 BPM | TEMPERATURE: 97.34 F | OXYGEN SATURATION: 100 % | SYSTOLIC BLOOD PRESSURE: 95 MMHG | DIASTOLIC BLOOD PRESSURE: 58 MMHG

## 2018-03-21 VITALS — HEART RATE: 132 BPM | OXYGEN SATURATION: 96 %

## 2018-03-21 VITALS — HEART RATE: 66 BPM

## 2018-03-21 VITALS — OXYGEN SATURATION: 94 %

## 2018-03-21 LAB
ALBUMIN SERPL-MCNC: 2.7 GM/DL (ref 3.4–5)
ALP SERPL-CCNC: 62 U/L (ref 45–117)
ALT SERPL-CCNC: 33 U/L (ref 12–78)
AST SERPL-CCNC: 52 U/L (ref 15–37)
BUN SERPL-MCNC: 39 MG/DL (ref 7–18)
BUN SERPL-MCNC: 42 MG/DL (ref 7–18)
BUN SERPL-MCNC: 51 MG/DL (ref 7–18)
CALCIUM SERPL-MCNC: 8.9 MG/DL (ref 8.5–10.1)
CALCIUM SERPL-MCNC: 9 MG/DL (ref 8.5–10.1)
CK MB SERPL-MCNC: 7.1 NG/ML (ref 0.5–3.6)
CO2 SERPL-SCNC: 15 MMOL/L (ref 21–32)
CO2 SERPL-SCNC: 17 MMOL/L (ref 21–32)
CREAT SERPL-MCNC: 2.26 MG/DL (ref 0.6–1.2)
CREAT SERPL-MCNC: 2.68 MG/DL (ref 0.6–1.2)
EOSINOPHIL NFR BLD AUTO: 140 K/UL (ref 130–400)
GLUCOSE SERPL-MCNC: 115 MG/DL (ref 70–99)
GLUCOSE SERPL-MCNC: 92 MG/DL (ref 70–99)
HCT VFR BLD CALC: 37.2 % (ref 37–47)
HEPATITIS A IGM TC 51813E: (no result)
HEPATITIS B CORE IGM  TC51854R: (no result)
HGB BLD-MCNC: 12.3 G/DL (ref 12–16)
MCH RBC QN AUTO: 27.6 PG (ref 25–34)
MCHC RBC AUTO-ENTMCNC: 33.1 G/DL (ref 32–36)
MCV RBC AUTO: 83.4 FL (ref 80–100)
PHOSPHATE SERPL-MCNC: 4.3 MG/DL (ref 2.5–4.9)
PMV BLD AUTO: 12.1 FL (ref 7.4–10.4)
POTASSIUM SERPL-SCNC: 4.8 MMOL/L (ref 3.5–5.1)
POTASSIUM SERPL-SCNC: 4.9 MMOL/L (ref 3.5–5.1)
PROT SERPL-MCNC: 6.2 GM/DL (ref 6.4–8.2)
PTT PATIENT: 45.5 SECONDS (ref 21–31)
PTT PATIENT: 52.9 SECONDS (ref 21–31)
PTT PATIENT: 56.7 SECONDS (ref 21–31)
RED CELL DISTRIBUTION WIDTH CV: 13.2 % (ref 11.5–14.5)
RED CELL DISTRIBUTION WIDTH SD: 39.8 FL (ref 36.4–46.3)
SODIUM SERPL-SCNC: 132 MMOL/L (ref 136–145)
SODIUM SERPL-SCNC: 133 MMOL/L (ref 136–145)
WBC # BLD AUTO: 18.13 K/UL (ref 4.8–10.8)

## 2018-03-21 RX ADMIN — SODIUM CHLORIDE SCH MLS/HR: 900 INJECTION, SOLUTION INTRAVENOUS at 18:29

## 2018-03-21 RX ADMIN — Medication SCH ML: at 09:30

## 2018-03-21 RX ADMIN — ALBUMIN HUMAN SCH GM: 250 SOLUTION INTRAVENOUS at 18:27

## 2018-03-21 RX ADMIN — SODIUM CHLORIDE SCH MLS/HR: 900 INJECTION, SOLUTION INTRAVENOUS at 20:05

## 2018-03-21 RX ADMIN — ALBUMIN HUMAN SCH GM: 250 SOLUTION INTRAVENOUS at 12:00

## 2018-03-21 RX ADMIN — CEFEPIME SCH MLS/MIN: 2 INJECTION, POWDER, FOR SOLUTION INTRAVENOUS at 05:08

## 2018-03-21 RX ADMIN — VANCOMYCIN HYDROCHLORIDE SCH MG: 1 INJECTION, POWDER, LYOPHILIZED, FOR SOLUTION INTRAVENOUS at 09:30

## 2018-03-21 RX ADMIN — METRONIDAZOLE SCH MLS/HR: 500 INJECTION, SOLUTION INTRAVENOUS at 05:51

## 2018-03-21 RX ADMIN — Medication SCH ML: at 12:49

## 2018-03-21 RX ADMIN — METRONIDAZOLE SCH MLS/HR: 500 INJECTION, SOLUTION INTRAVENOUS at 21:33

## 2018-03-21 RX ADMIN — HEPARIN SODIUM SCH MLS/HR: 5000 INJECTION, SOLUTION INTRAVENOUS at 01:45

## 2018-03-21 RX ADMIN — VANCOMYCIN HYDROCHLORIDE SCH MG: 1 INJECTION, POWDER, LYOPHILIZED, FOR SOLUTION INTRAVENOUS at 20:03

## 2018-03-21 RX ADMIN — VANCOMYCIN HYDROCHLORIDE SCH MG: 1 INJECTION, POWDER, LYOPHILIZED, FOR SOLUTION INTRAVENOUS at 12:49

## 2018-03-21 RX ADMIN — Medication SCH ML: at 20:03

## 2018-03-21 RX ADMIN — SODIUM CHLORIDE SCH MLS/HR: 900 INJECTION, SOLUTION INTRAVENOUS at 04:32

## 2018-03-21 RX ADMIN — Medication SCH ML: at 18:26

## 2018-03-21 RX ADMIN — ALBUMIN HUMAN SCH GM: 250 SOLUTION INTRAVENOUS at 05:09

## 2018-03-21 RX ADMIN — METRONIDAZOLE SCH MLS/HR: 500 INJECTION, SOLUTION INTRAVENOUS at 14:46

## 2018-03-21 RX ADMIN — VANCOMYCIN HYDROCHLORIDE SCH MG: 1 INJECTION, POWDER, LYOPHILIZED, FOR SOLUTION INTRAVENOUS at 18:27

## 2018-03-21 RX ADMIN — ALBUMIN HUMAN SCH GM: 250 SOLUTION INTRAVENOUS at 23:49

## 2018-03-21 RX ADMIN — MAGNESIUM SULFATE IN DEXTROSE SCH MLS/HR: 10 INJECTION, SOLUTION INTRAVENOUS at 00:26

## 2018-03-21 RX ADMIN — MAGNESIUM SULFATE IN DEXTROSE SCH MLS/HR: 10 INJECTION, SOLUTION INTRAVENOUS at 02:00

## 2018-03-21 RX ADMIN — PANTOPRAZOLE SODIUM SCH MLS/MIN: 40 INJECTION, POWDER, FOR SOLUTION INTRAVENOUS at 11:00

## 2018-03-21 NOTE — DIAGNOSTIC IMAGING REPORT
DUPLEX PORTAL HEPATIC VEINS



CLINICAL HISTORY: ascites - eval portal vein, please ascites. Venous thrombosis.



TECHNIQUE: Venous Doppler



COMPARISON STUDY:  None



FINDINGS: Antegrade and patent flow within the portal and hepatic venous

structures. Flow within the inferior vena cava is



IMPRESSION:  Negative study. All major venous structures are patent and

antegrade in flow. 









The above report was generated using voice recognition software.  It may contain

grammatical, syntax or spelling errors.







Electronically signed by:  Edgar Rudd M.D.

3/21/2018 6:30 AM



Dictated Date/Time:  3/21/2018 6:29 AM

## 2018-03-21 NOTE — CRITICAL CARE CONSULTATION
Critical Care Consultation


Date of Consultation:


Mar 21, 2018.


Attending Physician:


Matias Lyn M.D.


Reason for Consultation:


62-year-old female admitted for presumed sepsis from possible cellulitis to the 

LEFT lower extremity with RIGHT-sided heart failure and diffuse anasarca.  

Patient developed A. fib with RVR this evening which did not respond to IV 

metoprolol 2.  Transfer to the ICU for further evaluation and management.


History of Present Illness


Patient is a 62-year-old female originally from [place], but most recently 

resided in [place] who moved to the area in January to be closer to 

family.  Over the past few weeks she has noticed increasing swelling to the 

lower extremities as well as some tightness in her abdomen.  She developed a 

fever last evening and presented to the emergency department for further 

evaluation and management.  The patient was presumed septic and placed on broad-

spectrum antibiotics including a one-time dose of daptomycin, Zosyn, and 

clindamycin.  She underwent chest x-ray which was concerning for pulmonary 

infiltrate recommending CT of the chest.  On CT of the chest, the patient was 

noted to have severe dilation of the RIGHT ventricle.  In addition, CT of the 

abdomen pelvis demonstrates moderate anasarca, engorgement of the IVC and 

hepatic veins as well as hypoperfusion complex of the abdomen.  Lower extremity 

ultrasound was concerning for cellulitis.  No DVT noted.  The patient was 

started on cefepime as well as Flagyl and doxycycline.  She tested positive for 

C. difficile earlier this evening.  This evening, the patient went into rapid 

A. fib with RVR.  She did not convert with metoprolol 2 doses.  Repeat 

laboratory assessment does demonstrate increase troponin at greater than 2.  

She was started on a heparin drip and transferred to the ICU for continued 

management.





She has no reported past medical history, however she does remember a 

discussion of thyroid disease in the past, however she is uncertain if it was 

hypo-or hyperthyroidism.  She does not take any medications for this.





Past Medical/Surgical History


Thyroid Disease - Unknown





Family History


noncontributory





Social History


Smoking Status:  Never Smoker


Smokeless Tobacco Use:  No


Alcohol Use:  none


Drug Use:  none


Marital Status:  single


Housing Status:  lives with family


Occupation Status:  other





Allergies


Coded Allergies:  


     Sulfa Antibiotics (Verified  Allergy, Unknown, swelling, 3/19/18)





Home Medications


No Active Prescriptions or Reported Meds





Current Inpatient Medications





Current Inpatient Medications








 Medications


  (Trade)  Dose


 Ordered  Sig/Wilman


 Route  Start Time


 Stop Time Status Last Admin


Dose Admin


 


 Ioversol


  (Optiray 320)  100 ml  UD  PRN


 IV  3/20/18 01:30


 3/24/18 01:29   


 


 


 Cefepime HCl


  (Consult)  1 ea  DAILY  PRN


 N/A  3/20/18 03:03


 4/19/18 03:02   


 


 


 Methimazole


  (Methimazole Tab)  10 mg  BID


 PO  3/20/18 20:00


 4/19/18 20:59  3/20/18 20:01


10 MG


 


 Acetaminophen


  (Tylenol Tab)  325 mg  Q6H  PRN


 PO  3/20/18 03:15


 4/19/18 03:14  3/20/18 23:29


325 MG


 


 Prochlorperazine


 Edisylate 5 mg/


 Syringe  5 ml @ 5


 mls/min  Q6H  PRN


 IV  3/20/18 03:15


 4/19/18 03:14   


 


 


 Tramadol HCl


  (Ultram Tab)  not


 relieved


 by tylenol @   Q6H  PRN


 PO  3/20/18 03:15


 4/19/18 03:14  3/20/18 19:12


50 MG


 


 Lorazepam


  (Ativan Inj)  0.5 mg  Q4H  PRN


 IV  3/20/18 03:15


 4/19/18 03:14   


 


 


 Albumin Human


  (Albumin 25%)  12.5 gm  Q6H


 IV  3/20/18 12:00


 3/23/18 05:59  3/21/18 05:09


12.5 GM


 


 Cefepime HCl 2000


 mg/Syringe  20 ml @ 5


 mls/min  Q24H


 IV  3/21/18 04:30


 3/29/18 04:33  3/21/18 05:08


5 MLS/MIN


 


 Doxycycline


 Hyclate 100 mg/


 Dextrose  110 ml @ 


 50 mls/hr  BID


 IV  3/20/18 08:00


 3/30/18 07:59  3/20/18 20:00


50 MLS/HR


 


 Metronidazole 500


 mg/Prmx  100 ml @ 


 100 mls/hr  Q8H


 IV  3/20/18 14:00


 3/30/18 13:59  3/20/18 22:08


100 MLS/HR


 


 Sodium Chloride  1,000 ml @ 


 75 mls/hr  [id number]


 IV  3/20/18 14:45


 4/19/18 14:44  3/21/18 04:32


75 MLS/HR


 


 Heparin Sodium/


 Dextrose  500 ml @ 


 12 mls/hr  Q24H


 IV  3/21/18 01:45


 4/20/18 01:44   


 


 


 Miscellaneous


 Information


  (Icu Protocol


 For Hyperglycemia)  1 ea  PRN  PRN


 N/A  3/21/18 02:15


 3/23/18 02:14   


 


 


 Pantoprazole


 Sodium 40 mg/


 Syringe  10 ml @ 5


 mls/min  DAILY@11


 IV  3/21/18 11:00


 4/20/18 10:59   


 


 


 Diltiazem HCl 125


 mg/Dextrose  125 ml @ 0


 mls/hr  Q0M PRN


 IV  3/21/18 04:15


 4/20/18 04:14   


 











Review of Systems


A complete 10-point Review of Systems was discussed with the patient, with 

pertinent positives and negatives listed in the History of Present Illness. All 

remaining Review of Systems questions can be considered negative unless 

otherwise specified.





Physical Exam











  Date Time  Temp Pulse Resp B/P (MAP) Pulse Ox O2 Delivery O2 Flow Rate FiO2


 


3/21/18 05:00  118 25  94   


 


3/21/18 04:00 36.7 125 13 91/55 (67) 94 Room Air  


 


3/21/18 04:00      Room Air  


 


3/21/18 04:00  125 13  94   


 


3/21/18 03:00  132 25  96   


 


3/21/18 02:48  135  119/79    


 


3/21/18 02:00  132 29  95   


 


3/21/18 02:00  132 29 89/68 (75) 95 Room Air  


 


3/21/18 01:30      Room Air  


 


3/21/18 01:04 37.2 142 20  93   


 


3/21/18 00:28  131  103/69 (80)    


 


3/21/18 00:00      Room Air  


 


3/20/18 23:59  129  98/62    


 


3/20/18 23:43  139  99/71 (80)    





    103/65 (78)    


 


3/20/18 23:31  143 18 105/76 (86) 93 Room Air  


 


3/20/18 23:10  155  112/71    


 


3/20/18 22:52 37.2 135 24 120/67 (84) 96 Room Air  


 


3/20/18 19:25 36.8 94 20 118/74 (89) 95 Room Air  


 


3/20/18 19:00      Room Air  


 


3/20/18 16:00 36.5 88 16  96   


 


3/20/18 15:45 36.5 88 16 106/66 (79) 96   


 


3/20/18 15:30      Room Air  


 


3/20/18 13:39 36.5 91 20 103/69 (80) 96 Room Air  


 


3/20/18 12:01 36.6 90 22 95/63 (74) 96 Room Air  


 


3/20/18 08:41    107/73 (84)    


 


3/20/18 08:00      Room Air  








VITAL SIGNS - Vital signs and nursing notes were reviewed.


GENERAL - 62-year-old female appearing her stated age who is in no acute 

distress.


SKIN - Diffuse edema to the bilateral lower extremities - LEFT>right.


HEAD - NC/AT.


EYES - PERRL with EOMI bilaterally. Sclera anicteric.


EARS - No deformities of external structures noted on gross examination 

bilaterally. 


NOSE - Midline and without cyanosis.


MOUTH/OROPHARYNX - Without perioral cyanosis. Buccal mucosa pink and moist and 

without leukoplakia. 


NECK - Neck with FROM. Supple to palpation. 


LUNGS - Chest wall symmetric without accessory muscle use, intercostals 

retractions, or central cyanosis. Normal vesicular breath sounds CTA B/L. No 

wheezes, rales, or rhonchi appreciated.


CARDIAC - RRR with S1/S2. No murmur, rubs, or gallops appreciated. 


ABDOMEN - Abdominal contour tight w/ palpable ascites. Without pulsations or 

visible masses. BS normoactive all four quadrants. 


EXTREMITIES - No clubbing or peripheral cyanosis. +3/5 radial, posterior tibial

, and dorsalis pedis pulses palpated throughout.


NEUROLOGIC - Cranial nerves II through XII grossly intact.


PSYCH - A&Ox3





Laboratory Results





Last 24 Hours








Test


  3/20/18


05:56 3/20/18


05:57 3/20/18


07:50 3/20/18


12:03


 


Lactic Acid Level 3.3 mmol/L    2.9 mmol/L 


 


White Blood Count  8.57 K/uL   


 


Red Blood Count  4.16 M/uL   


 


Hemoglobin  11.7 g/dL   


 


Hematocrit  34.6 %   


 


Mean Corpuscular Volume  83.2 fL   


 


Mean Corpuscular Hemoglobin  28.1 pg   


 


Mean Corpuscular Hemoglobin


Concent 


  33.8 g/dl 


  


  


 


 


Platelet Count  132 K/uL   


 


Mean Platelet Volume  12.1 fL   


 


RDW Standard Deviation  39.6 fL   


 


RDW Coefficient of Variation  13.1 %   


 


Neutrophils % (Manual)  82.4 %   


 


Lymphocytes % (Manual)  12.3 %   


 


Monocytes % (Manual)  5.3 %   


 


Neutrophils # (Manual)  7.06 K/uL   


 


Total Absolute Neutrophils  7.06 K/uL   


 


Lymphocytes # (Manual)  1.05 K/uL   


 


Total Absolute Lymphocytes  1.05 K/uL   


 


Monocytes # (Manual)  0.45 K/uL   


 


Toxic Vacuolation  3+   


 


Echinocytes  1+   


 


Iron Level  16 mcg/dl   


 


Ammonia  < 10.0 umol/L   


 


Total Creatine Kinase  514 U/L   


 


Lipase  56 U/L   


 


Total Triiodothyronine  0.49 ng/ml   


 


Hepatitis B Surface Antigen  NEG   


 


Hepatitis C Antibody  NEG   


 


Urine Color   DK YELLOW  


 


Urine Appearance   CLEAR  


 


Urine pH   5.0  


 


Urine Specific Gravity   > 1.045  


 


Urine Protein   2+  


 


Urine Glucose (UA)   NEG  


 


Urine Ketones   TRACE  


 


Urine Occult Blood   TRACE  


 


Urine Nitrite   NEG  


 


Urine Bilirubin   NEG  


 


Urine Urobilinogen   NEG  


 


Urine Leukocyte Esterase   NEG  


 


Urine WBC (Auto)   5-10 /hpf  


 


Urine RBC (Auto)   0-4 /hpf  


 


Urine Hyaline Casts (Auto)   5-10 /lpf  


 


Urine Epithelial Cells (Auto)   >30 /lpf  


 


Urine Bacteria (Auto)   NEG  


 


Test


  3/20/18


22:45 3/20/18


23:13 3/21/18


01:36 3/21/18


02:38


 


Stool Occult Blood NEGATIVE    


 


White Blood Count  16.42 K/uL   


 


Red Blood Count  3.89 M/uL   


 


Hemoglobin  10.9 g/dL   


 


Hematocrit  32.1 %   


 


Mean Corpuscular Volume  82.5 fL   


 


Mean Corpuscular Hemoglobin  28.0 pg   


 


Mean Corpuscular Hemoglobin


Concent 


  34.0 g/dl 


  


  


 


 


RDW Standard Deviation  39.9 fL   


 


RDW Coefficient of Variation  13.2 %   


 


Platelet Count  137 K/uL   


 


Mean Platelet Volume  12.0 fL   


 


Activated Partial


Thromboplast Time 


  45.5 SECONDS 


  


  


 


 


Partial Thromboplastin Ratio  1.8   


 


Sodium Level  135 mmol/L   


 


Potassium Level  4.2 mmol/L   


 


Chloride Level  105 mmol/L   


 


Carbon Dioxide Level  19 mmol/L   


 


Anion Gap  11.0 mmol/L   


 


Blood Urea Nitrogen  39 mg/dl   


 


Creatinine  1.95 mg/dl   


 


Est Creatinine Clear Calc


Drug Dose 


  23.8 ml/min 


  


  


 


 


Estimated GFR (


American) 


  31.6 


  


  


 


 


Estimated GFR (Non-


American 


  27.3 


  


  


 


 


BUN/Creatinine Ratio  19.9   


 


Random Glucose  101 mg/dl   


 


Lactic Acid Level  2.6 mmol/L   


 


Calcium Level  8.6 mg/dl   


 


Magnesium Level  1.9 mg/dl   


 


Total Bilirubin  1.2 mg/dl   


 


Aspartate Amino Transf


(AST/SGOT) 


  47 U/L 


  


  


 


 


Alanine Aminotransferase


(ALT/SGPT) 


  33 U/L 


  


  


 


 


Alkaline Phosphatase  58 U/L   


 


Troponin I  2.980 ng/ml   


 


Total Protein  5.7 gm/dl   


 


Albumin  2.4 gm/dl   


 


Globulin  3.3 gm/dl   


 


Albumin/Globulin Ratio  0.7   


 


Bedside Glucose    97 mg/dl 


 


Test


  3/21/18


03:30 3/21/18


04:44 


  


 


 


Urine Opiates Screen POS    


 


Urine Methadone, Qualitative NEG    


 


Urine Barbiturates NEG    


 


Urine Phencyclidine (PCP)


Level NEG 


  


  


  


 


 


Ur


Amphetamine/Methamphetamine NEG 


  


  


  


 


 


MDMA (Ecstasy) Screen NEG    


 


Urine Benzodiazepines Screen NEG    


 


Urine Cocaine Metabolite NEG    


 


Urine Marijuana (THC) NEG    


 


Creatine Kinase MB Ratio     











Diagnostic Results


Radiological imaging and reports were reviewed by myself.





Assessment & Plan





(1) Atrial fibrillation with RVR


(2) Hypotension


(3) Right-sided heart failure


(4) Anasarca


(5) Elevated troponin


(6) Hyperthyroidism


(7) C. difficile diarrhea


(8) Left leg pain


(9) Left leg cellulitis


(10) Sepsis


Reason Critically Ill: 62-year-old female admitted for presumed sepsis from 

possible cellulitis to the LEFT lower extremity with RIGHT-sided heart failure 

and diffuse anasarca.  Patient developed A. fib with RVR this evening which did 

not respond to IV metoprolol 2.  Transfer to the ICU for further evaluation 

and management.





Neuro -


* CAM ICU: NEGATIVE


* LLE Pain/Cellulitis - PRN Ultram





Cardiac -


* A. Fib w/ RVR:


 * Likely 2/2 sepsis picture coupled with RIGHT sided heart failure.


 * Received Metoprolol 5mg x3 w/o resolve. Added 250mL NSS bolus. Started on 

Cardizem gtt w/o bolus. Converted at 0522.


 * Started on Heparin per primary service.


* Elevated troponin >2:


 * Concerning w/ new RIGHT heart failure.


 * Possibly related to demand ischemia in the setting of sepsis w/ new a fib w/ 

BERNABE and poor clearance.


* RIGHT sided heart failure:


 * No PE per CTA.


 * No h/o Pulm HTN


 * Added Portal Vein US for any other possible causes of anasarca.


* Appreciate cardiology consultation.





Respiratory -


* No h/o pulmonary disease.


* NC PRN O2 supplementation.





GI - 


* Anasarca w/ portal congestion:


 * Likely 2/2 RIGHT heart failure from ??cause.


* NPO


   


RENAL/LYTES -


* BERNABE in the setting of sepsis:


 * Gentle hydration in the setting of RIGHT sided heart failure.





 - 


* Add Linares for strict I&Os





ENDO - 


* No h/o DM


* ??h/o unknown thyroid disease:


 * Essentially undetectable TSH, however Free T4 not greatly abnormal.


 * Unable to completely rule out thyroid storm which could certainly help 

explain some of the above (i.e. RHF, Afib, etc.)


 * Will treat underlying sepsis first and reassess as clinically indicated.


 * Add random cortisol for ??utility of stress dose steroids.





HEME -


* Stable H&H at this point.


* Will trend





ID -


* Gram NEGATIVE Bacilli Bacteremia w/ Sepsis:


 * Currently on Cefepime, Flagyl, Doxy for ?? LLE Cellulitis as source.


 * Waiting on Urine Culture.


 * Trend Lactate.


 * Repeat Procal


* C. Diff POSITIVE:


 * IF Flagyl currently





LINES/IV ACCESS - 


* PIVs intact.


* Linares Catheter





DVT PROPHYLAXIS -


* Heparin gtt





I have personally spent 55 minutes of critical care time in the direct 

management of this patient. This is a life/limb threatening event. This 

includes time spent evaluating patient, direct bedside care, chart review, 

placing orders, interpretation of diagnostic studies, discussion with 

consultants, patient, and family members, as well as other required patient 

management activities. 


This time is exclusive of all separately billable procedures, and teaching time 

and separate from and in addition to any other critical care service time.





Thank you for this consultation allow us to be part of this patient's care.  

Please refer to my attending physician's documentation for any further 

recommendations.





Attending addendum,





The patient was seen and examined, independently, case discussed with the 

daughter Dr. Byrd who is an oncologist, discussed also with the staff, all her 

labs and data has been reviewed on rounds as well.





Assessment:





1.  Septic shock secondary to C. difficile colitis.


2.  Gram-negative bacteremia which could be translocated bacteria from the 

pancolitis the patient has.  Identification of the bacteria has not been 

determined.


3.  I did not appreciate acute cellulitis in the left lower extremity however, 

the patient leg edema is in the adjacent to the retroperitoneal lymphadenopathy 

affecting the takeoff of the left iliac vein.  I have showed the views of the 

images with her daughter who is oncologist as well.


4.  Altered mental status of unknown etiology, MRI revealed abnormality however 

could not comment on the findings due to artifacts from dental fillings.  

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


5.  New onset A. fib in a patient who has severe RV dilation with severe failure

, both are representation of pulmonary hypertension.  WHO class yet to be 

determined.  Although CT angiomas negative, VQ scan is needed to determine 

CTEPH.  Discussed with cardiology.


6.  AK I on chronic kidney disease, highly likely secondary to sepsis, however 

contrast-induced nephropathy cannot be excluded.


7.  To mention, this patient has no past medical history according to the 

daughter, but lately she has been having increasing shortness of breath even 

with walking short distances.  But she does not have any neurologic deficit as 

well as of last night.





Plan:





1.  I will continue with the antibiotics and add Vanco via NG tube as the 

patient is nonverbal at the moment with altered mental status.


2.  Place an NG tube.


3.  Echocardiogram was reviewed with Dr. Pedraza, appreciate his input.


4.  Neurology consult due to altered mental status.


5.  MRI of the head was done and reports limited study due to dental filling, 

findings are suspicious for osmotic demyelination syndrome however the patient 

did not have any altered sodium level.


6.  Continue with IV hydration given her recent contrast receiving for CT, and 

ongoing sepsis.


7.  Even with the absence of diarrhea, the patient should be treated for the C. 

difficile colitis given the findings on the CAT scan which showed pancolitis.


8.  I will spare 24 hours prior to a decision to place PA catheter hopefully in 

the morning.


9.  We will continue to follow the trend of the CBC as well as a BUN/creatinine.


10.  Continue with DVT prophylaxis.  And GI prophylaxis.


11.  I will stop doxycycline and continue cefepime, Flagyl and Vanco p.o.


12.  NIH score was not obtainable as the patient could not cooperate with the 

questionnaire.





Case discussed with the staff and with the daughter and details.  Critical care 

time spent with the patient was 60 minutes.





Billing inquiry has been deferred due to professional courtesy.





Problem Qualifiers





(1) Right-sided heart failure:  


Heart failure chronicity:  acute  Qualified Codes:  I50.811 - Acute right heart 

failure

## 2018-03-21 NOTE — GASTROENTEROLOGY PROGRESS NOTE
Progress Note


Date of Service:  Mar 21, 2018


Subjective


Pt evaluation today including:  conversation w/ patient, physical exam, chart 

review, lab review, review of studies, review of inpatient medication list


Pt transferred to ICU overnight for Afib, started on Cardizem gtt, but held 

this AM due to bradycardia (HR 50s). Currently NSR. She had been confused, has 

a sitter. Just fell asleep per sitter <1hr ago. Not easily aroused when name 

called. Breathing regular, HR regular. Unable to obtain ROS





Reviewed chart - labs showed WBC up to 18. Blood cx still gram negative 

bacilli. Stool + for Cdiff, had been started on Flagyl IV. Liver doppler done 

yesterday normal.





Review of Systems


Constitutional:  + see HPI





Medications





Current Inpatient Medications








 Medications


  (Trade)  Dose


 Ordered  Sig/Wilman


 Route  Start Time


 Stop Time Status Last Admin


Dose Admin


 


 Ioversol


  (Optiray 320)  100 ml  UD  PRN


 IV  3/20/18 01:30


 3/24/18 01:29   


 


 


 Cefepime HCl


  (Consult)  1 ea  DAILY  PRN


 N/A  3/20/18 03:03


 4/19/18 03:02   


 


 


 Methimazole


  (Methimazole Tab)  10 mg  BID


 PO  3/20/18 20:00


 4/19/18 20:59  3/20/18 20:01


10 MG


 


 Acetaminophen


  (Tylenol Tab)  325 mg  Q6H  PRN


 PO  3/20/18 03:15


 4/19/18 03:14  3/20/18 23:29


325 MG


 


 Prochlorperazine


 Edisylate 5 mg/


 Syringe  5 ml @ 5


 mls/min  Q6H  PRN


 IV  3/20/18 03:15


 4/19/18 03:14   


 


 


 Tramadol HCl


  (Ultram Tab)  not


 relieved


 by tylenol @   Q6H  PRN


 PO  3/20/18 03:15


 4/19/18 03:14  3/20/18 19:12


50 MG


 


 Lorazepam


  (Ativan Inj)  0.5 mg  Q4H  PRN


 IV  3/20/18 03:15


 4/19/18 03:14   


 


 


 Albumin Human


  (Albumin 25%)  12.5 gm  Q6H


 IV  3/20/18 12:00


 3/23/18 05:59  3/21/18 05:09


12.5 GM


 


 Cefepime HCl 2000


 mg/Syringe  20 ml @ 5


 mls/min  Q24H


 IV  3/21/18 04:30


 3/29/18 04:33  3/21/18 05:08


5 MLS/MIN


 


 Doxycycline


 Hyclate 100 mg/


 Dextrose  110 ml @ 


 50 mls/hr  BID


 IV  3/20/18 08:00


 3/30/18 07:59  3/20/18 20:00


50 MLS/HR


 


 Metronidazole 500


 mg/Prmx  100 ml @ 


 100 mls/hr  Q8H


 IV  3/20/18 14:00


 3/30/18 13:59  3/21/18 05:51


100 MLS/HR


 


 Sodium Chloride  1,000 ml @ 


 75 mls/hr  [id number]


 IV  3/20/18 14:45


 4/19/18 14:44  3/21/18 04:32


75 MLS/HR


 


 Heparin Sodium/


 Dextrose  500 ml @ 


 12 mls/hr  Q24H


 IV  3/21/18 01:45


 4/20/18 01:44   


 


 


 Miscellaneous


 Information


  (Icu Protocol


 For Hyperglycemia)  1 ea  PRN  PRN


 N/A  3/21/18 02:15


 3/23/18 02:14   


 


 


 Pantoprazole


 Sodium 40 mg/


 Syringe  10 ml @ 5


 mls/min  DAILY@11


 IV  3/21/18 11:00


 4/20/18 10:59   


 


 


 Vancomycin HCl


  (Vancomycin Oral


 Soln)  500 mg  QID


 PO  3/21/18 09:30


 4/4/18 09:29   


 


 


 Raspberry


  (Raspberry Syrup


 5ml Cup)  5 ml  QID


 PO  3/21/18 09:30


 4/4/18 09:29   


 











Objective


Vital Signs











  Date Time  Temp Pulse Resp B/P (MAP) Pulse Ox O2 Delivery O2 Flow Rate FiO2


 


3/21/18 10:00  55 18 114/62 (79) 94 Nasal Cannula 2.0 


 


3/21/18 09:00  57 16 110/63 (79) 100 Nasal Cannula 2.0 


 


3/21/18 08:32  58 0 110/68 (91) 100   


 


3/21/18 08:01  59 18 102/64 (71)    


 


3/21/18 08:00 36.7 57 22 107/63 (78) 87 Room Air  


 


3/21/18 08:00     95 Nasal Cannula 2.0 


 


3/21/18 08:00  56 0  93   


 


3/21/18 07:31  63 1 90/64 (72) 76   


 


3/21/18 07:05  66 12 105/63 (76)    


 


3/21/18 07:02  69 19 74/58 (68)    


 


3/21/18 07:00  66 8     


 


3/21/18 07:00  66 8     


 


3/21/18 06:00  71 1  95   


 


3/21/18 05:00  118 25  94   


 


3/21/18 04:00 36.7 125 13 91/55 (67) 94 Room Air  


 


3/21/18 04:00      Room Air  


 


3/21/18 04:00  125 13  94   


 


3/21/18 03:00  132 25  96   


 


3/21/18 02:48  135  119/79    


 


3/21/18 02:00  132 29  95   


 


3/21/18 02:00  132 29 89/68 (75) 95 Room Air  


 


3/21/18 01:30      Room Air  


 


3/21/18 01:04 37.2 142 20  93   


 


3/21/18 00:28  131  103/69 (80)    


 


3/21/18 00:00      Room Air  


 


3/20/18 23:59  129  98/62    


 


3/20/18 23:43  139  99/71 (80)    





    103/65 (78)    


 


3/20/18 23:31  143 18 105/76 (86) 93 Room Air  


 


3/20/18 23:10  155  112/71    


 


3/20/18 22:52 37.2 135 24 120/67 (84) 96 Room Air  


 


3/20/18 19:25 36.8 94 20 118/74 (89) 95 Room Air  


 


3/20/18 19:00      Room Air  


 


3/20/18 16:00 36.5 88 16  96   


 


3/20/18 15:45 36.5 88 16 106/66 (79) 96   


 


3/20/18 15:30      Room Air  


 


3/20/18 13:39 36.5 91 20 103/69 (80) 96 Room Air  


 


3/20/18 12:01 36.6 90 22 95/63 (74) 96 Room Air  











Physical Exam


General Appearance:  no apparent distress


Neck:  supple, no JVD, trachea midline


Respiratory/Chest:  normal breath sounds, no respiratory distress, no accessory 

muscle use


Cardiovascular:  regular rate, rhythm, no gallop, no murmur


Abdomen:  normal bowel sounds, + distended (mild)


Extremities:  + swelling (mild leg swelling bilaterally)


Neurologic/Psych:  + pertinent finding (asleep, had been confused per RN and 

sitter)


Skin:  normal color, no jaundice, no rash





Laboratory Results





Last 24 Hours








Test


  3/20/18


12:03 3/20/18


22:45 3/20/18


23:13 3/21/18


02:38


 


Lactic Acid Level 2.9 mmol/L   2.6 mmol/L  


 


Stool Occult Blood  NEGATIVE   


 


White Blood Count   16.42 K/uL  


 


Red Blood Count   3.89 M/uL  


 


Hemoglobin   10.9 g/dL  


 


Hematocrit   32.1 %  


 


Mean Corpuscular Volume   82.5 fL  


 


Mean Corpuscular Hemoglobin   28.0 pg  


 


Mean Corpuscular Hemoglobin


Concent 


  


  34.0 g/dl 


  


 


 


RDW Standard Deviation   39.9 fL  


 


RDW Coefficient of Variation   13.2 %  


 


Platelet Count   137 K/uL  


 


Mean Platelet Volume   12.0 fL  


 


Activated Partial


Thromboplast Time 


  


  45.5 SECONDS 


  


 


 


Partial Thromboplastin Ratio   1.8  


 


Sodium Level   135 mmol/L  


 


Potassium Level   4.2 mmol/L  


 


Chloride Level   105 mmol/L  


 


Carbon Dioxide Level   19 mmol/L  


 


Anion Gap   11.0 mmol/L  


 


Blood Urea Nitrogen   39 mg/dl  


 


Creatinine   1.95 mg/dl  


 


Est Creatinine Clear Calc


Drug Dose 


  


  23.8 ml/min 


  


 


 


Estimated GFR (


American) 


  


  31.6 


  


 


 


Estimated GFR (Non-


American 


  


  27.3 


  


 


 


BUN/Creatinine Ratio   19.9  


 


Random Glucose   101 mg/dl  


 


Calcium Level   8.6 mg/dl  


 


Magnesium Level   1.9 mg/dl  


 


Total Bilirubin   1.2 mg/dl  


 


Aspartate Amino Transf


(AST/SGOT) 


  


  47 U/L 


  


 


 


Alanine Aminotransferase


(ALT/SGPT) 


  


  33 U/L 


  


 


 


Alkaline Phosphatase   58 U/L  


 


Troponin I   2.980 ng/ml  


 


Total Protein   5.7 gm/dl  


 


Albumin   2.4 gm/dl  


 


Globulin   3.3 gm/dl  


 


Albumin/Globulin Ratio   0.7  


 


Bedside Glucose    97 mg/dl 


 


Test


  3/21/18


03:30 3/21/18


04:44 3/21/18


05:53 3/21/18


08:38


 


Urine Opiates Screen POS    


 


Urine Methadone, Qualitative NEG    


 


Urine Barbiturates NEG    


 


Urine Phencyclidine (PCP)


Level NEG 


  


  


  


 


 


Ur


Amphetamine/Methamphetamine NEG 


  


  


  


 


 


MDMA (Ecstasy) Screen NEG    


 


Urine Benzodiazepines Screen NEG    


 


Urine Cocaine Metabolite NEG    


 


Urine Marijuana (THC) NEG    


 


Creatine Kinase MB Ratio    3.8  


 


White Blood Count   18.13 K/uL  


 


Red Blood Count   4.46 M/uL  


 


Hemoglobin   12.3 g/dL  


 


Hematocrit   37.2 %  


 


Mean Corpuscular Volume   83.4 fL  


 


Mean Corpuscular Hemoglobin   27.6 pg  


 


Mean Corpuscular Hemoglobin


Concent 


  


  33.1 g/dl 


  


 


 


RDW Standard Deviation   39.8 fL  


 


RDW Coefficient of Variation   13.2 %  


 


Platelet Count   140 K/uL  


 


Mean Platelet Volume   12.1 fL  


 


Activated Partial


Thromboplast Time 


  


  56.7 SECONDS 


  52.9 SECONDS 


 


 


Partial Thromboplastin Ratio   2.2  2.0 


 


Sodium Level   133 mmol/L  


 


Potassium Level   4.8 mmol/L  


 


Chloride Level   105 mmol/L  


 


Carbon Dioxide Level   17 mmol/L  


 


Anion Gap   11.0 mmol/L  


 


Blood Urea Nitrogen   42 mg/dl  


 


Creatinine   2.26 mg/dl  


 


Est Creatinine Clear Calc


Drug Dose 


  


  22.7 ml/min 


  


 


 


Estimated GFR (


American) 


  


  26.5 


  


 


 


Estimated GFR (Non-


American 


  


  22.8 


  


 


 


BUN/Creatinine Ratio   18.7  


 


Random Glucose   92 mg/dl  


 


Lactic Acid Level   2.4 mmol/L  


 


Calcium Level   8.9 mg/dl  


 


Ionized Calcium   1.22 mmol/l  


 


Phosphorus Level   4.3 mg/dl  


 


Magnesium Level   2.6 mg/dl  


 


Total Bilirubin   1.5 mg/dl  


 


Aspartate Amino Transf


(AST/SGOT) 


  


  52 U/L 


  


 


 


Alanine Aminotransferase


(ALT/SGPT) 


  


  33 U/L 


  


 


 


Alkaline Phosphatase   62 U/L  


 


Ammonia   < 10.0 umol/L  


 


Total Creatine Kinase   187 U/L  


 


Creatine Kinase MB   7.1 ng/ml  


 


Troponin I   1.930 ng/ml  


 


Total Protein   6.2 gm/dl  


 


Albumin   2.7 gm/dl  


 


Globulin   3.5 gm/dl  


 


Albumin/Globulin Ratio   0.8  


 


Procalcitonin   > 200.00 ng/ml  


 


Random Cortisol   59.81 mcg/dl  











Assessment and Plan


Patient is a 62 year old female currently admitted for sepsis (blood cx growing 

gram negative bacilli) ? L leg cellulitis related. She was suspected to have 

cirrhosis given ascites finding on CT scan though this is likely related to R 

heart congestion, pulmonary HTN. Ascites is too small in amt to perform 

diagnostic paracentesis. Liver appears normal in CT scan, and LFT were only 

mildly elevated (Tbili 2, AST 42). Plt and INR borderline abnormal. Doubt she 

has cirrhosis.





Transferred to ICU overnight for Afib w RVR, started on Cardizem gtt, now held 

for bradycardia. Currently NSR. She had been confused. WBC up to 18. Gm 

negative bacilli in blood cx, stool + for Cdiff currently on Flagyl IV. Liver 

doppler normal. Ammonia level low. 





Plans


- F/U infectious workup; defer antibiotic management to primary team


- Flagyl IV for Cdiff 


- Recommend consulting Cardiology for R heart congestion, pulmonary HTN, volume 

overload. 


- Recommend consulting Endocrinology for Hyperthyroidism. 


- No new GI plans; will watch peripherally, call if new questions/concerns 

arise.

## 2018-03-21 NOTE — DIAGNOSTIC IMAGING REPORT
KUB



CLINICAL HISTORY: 62 years-old Female presenting with KUB FOR NG TUBE PLACEMENT.





TECHNIQUE: Upright and supine views of the abdomen were obtained.



COMPARISON: CT from 3/20/2018.



FINDINGS:

Interval placement of a nasogastric tube, which terminates in the body the

stomach, sidehole also within the gastric lumen.



No bowel obstruction. No gross pneumoperitoneum.



Delayed nephrograms evidence of renal insufficiency. Multiple pelvic phleboliths

noted.



Osseous structures normal. Left basilar opacity suggested. 



IMPRESSION:

1.  Nasogastric tube appropriately positioned.



2.  Delayed nephrogram is evidence of renal insufficiency.



3.  Left basilar atelectasis may be present.







Electronically signed by:  Tay Koch M.D.

3/21/2018 12:14 PM



Dictated Date/Time:  3/21/2018 12:12 PM

## 2018-03-21 NOTE — DIAGNOSTIC IMAGING REPORT
BRAIN WITHOUT CONTRAST



CLINICAL HISTORY: 62 years-old Female presenting with altered mental status;

concern for CVA given conversion fib -SR. 



TECHNIQUE: Multisequence, multiplanar MR imaging of the brain was performed

without the use of intravenous contrast. IV contrast: None.



COMPARISON: CT head performed the previous day.



FINDINGS: 

Dental hardware results in significant susceptibility artifact degrading image

quality, most severely degrading diffusion-weighted imaging. This limits

diagnostic sensitivity the exam for ischemia.



Ventricles and sulci normal in size. Limited white matter FLAIR hyperintensity

evident in the brachium pontis bilaterally. No white matter changes in the jose.

Brain parenchyma otherwise normal in appearance with preserved gray-white

differentiation. No mass effect or midline shift. No restricted diffusion to

suggest acute ischemia. No hemorrhage. No extra-axial fluid collection. 



T2 skull base flow voids preserved.    



Bone marrow signal intensity within the calvarium within normal limits.



IMPRESSION:  

1.  Dental hardware results in significant susceptibility artifact degrading

image quality, most severely degrading diffusion-weighted imaging. This limits

diagnostic sensitivity the exam for ischemia.  



2.  Limited abnormal white matter signal intensity within the brachium pontis

bilaterally. This is nonspecific and can be seen in the setting of osmotic

demyelination syndrome, another a metabolic cause of encephalopathy or chronic

small vessel ischemic change.



3.  No other evidence of acute intracranial pathology.







Electronically signed by:  Tay Koch M.D.

3/21/2018 11:03 AM



Dictated Date/Time:  3/21/2018 10:54 AM

## 2018-03-21 NOTE — EEG PROCEDURE NOTE
EEG Procedure Note


Date of Service


Mar 21, 2018.





Start / End Times


Start Time:  1758


End Time:  1818





Referring Physician


Dr. Cohen





History


62 year old with sepsis and mental status changes.





Home Medication List


No Active Prescriptions or Reported Meds





Inpatient Medication List





Current Inpatient Medications








 Medications


  (Trade)  Dose


 Ordered  Sig/Wilman


 Route  Start Time


 Stop Time Status Last Admin


Dose Admin


 


 Ioversol


  (Optiray 320)  100 ml  UD  PRN


 IV  3/20/18 01:30


 3/24/18 01:29   


 


 


 Cefepime HCl


  (Consult)  1 ea  DAILY  PRN


 N/A  3/20/18 03:03


 4/19/18 03:02   


 


 


 Acetaminophen


  (Tylenol Tab)  325 mg  Q6H  PRN


 PO  3/20/18 03:15


 4/19/18 03:14  3/20/18 23:29


325 MG


 


 Prochlorperazine


 Edisylate 5 mg/


 Syringe  5 ml @ 5


 mls/min  Q6H  PRN


 IV  3/20/18 03:15


 4/19/18 03:14   


 


 


 Lorazepam


  (Ativan Inj)  0.5 mg  Q4H  PRN


 IV  3/20/18 03:15


 4/19/18 03:14   


 


 


 Albumin Human


  (Albumin 25%)  12.5 gm  Q6H


 IV  3/20/18 12:00


 3/23/18 05:59  3/21/18 18:27


12.5 GM


 


 Cefepime HCl 2000


 mg/Syringe  20 ml @ 5


 mls/min  Q24H


 IV  3/21/18 04:30


 3/29/18 04:33  3/21/18 05:08


5 MLS/MIN


 


 Metronidazole 500


 mg/Prmx  100 ml @ 


 100 mls/hr  Q8H


 IV  3/20/18 14:00


 3/30/18 13:59  3/21/18 14:46


100 MLS/HR


 


 Sodium Chloride  1,000 ml @ 


 75 mls/hr  [id number]


 IV  3/20/18 14:45


 4/19/18 14:44  3/21/18 18:29


75 MLS/HR


 


 Heparin Sodium/


 Dextrose  500 ml @ 


 12 mls/hr  Q24H


 IV  3/21/18 01:45


 4/20/18 01:44   


 


 


 Miscellaneous


 Information


  ([facility] Protocol


 For Hyperglycemia)  1 ea  PRN  PRN


 N/A  3/21/18 02:15


 3/23/18 02:14   


 


 


 Pantoprazole


 Sodium 40 mg/


 Syringe  10 ml @ 5


 mls/min  DAILY@11


 IV  3/21/18 11:00


 4/20/18 10:59  3/21/18 11:00


5 MLS/MIN


 


 Vancomycin HCl


  (Vancomycin Oral


 Soln)  500 mg  QID


 PO  3/21/18 09:30


 3/22/18 13:01  3/21/18 18:27


500 MG


 


 Raspberry


  (Raspberry Syrup


 5ml Cup)  5 ml  QID


 PO  3/21/18 09:30


 4/4/18 09:29  3/21/18 18:26


5 ML


 


 Vancomycin HCl


  (Vancomycin Oral


 Soln)  500 mg  QID


 PO  3/22/18 17:00


 4/5/18 16:59   


 











Description


This is a 21 electrode EEG with a single channel dedicated to limited EKG. The 

electrodes were placed in accordance with the International 10-20 system.





Interpretation


The predominant background activity consists with of an irregular 6-7 Hz 

activity, seen over all head regions, of up to 100mcV in amplitude, lasting 1-3 

seconds.  Interspersed with this were irregular lower amplitude periods of 3-4 

Hz activity lasting for 1-3 seconds.  There were no focal abnormalities seen 

and no potentially epileptogenic discharges were present.  Photic stimulation 

and hyperventilation were not performed on the routine, bedside EEG.





A mild amount of movement artifact activity was present, but did not hinder 

interpretation.





In summary, this study is remarkable for a moderate generalized dysrhythmia, 

without focal abnormalities or potentially epileptogenic discharges.





Clinical Correlation


Abnormalities of this type are generally consistent with a moderate 

encephalopathy, which could be due to a wide variety of causes (including sepsis

).  Clinical correlation is required.

## 2018-03-21 NOTE — CARDIOLOGY FOLLOW-UP
Subjective


General


Date of Service:


Mar 21, 2018.


Pt evaluation today including:  conversation w/ patient, physical exam, chart 

review, lab review, review of studies, conversation w/ consultant, review of 

inpatient medication list





History of Present Illness


The patient is a 62 year old female seen in follow-up.


Developed paroxysmal atrial fibrillation with rapid ventricular response 

overnight.


Patient treated with intravenous metoprolol and Cardizem.


She spontaneously converted to normal sinus rhythm this morning.


Intravenous heparin initiated.


Change in mental status this morning as well.


Patient is awake, however, unable to verbally respond to questions.


MRI of the brain negative for acute ischemia.


Patient now oliguric.  Creatinine trending upward.


Received IV contrast for CTA 3/19/18.





Allergies


Coded Allergies:  


     Sulfa Antibiotics (Verified  Allergy, Unknown, swelling, 3/19/18)





Social History


Smoking Status:  Never Smoker


Hx Alcohol Use - Type And Amou:  No





Problem List


Medical Problems:


(1) Anasarca


Status: Acute  





(2) Atrial fibrillation with RVR


Status: Acute  





(3) C. difficile diarrhea


Status: Acute  





(4) Elevated troponin


Status: Acute  





(5) Hyperthyroidism


Status: Acute  





(6) Hypotension


Status: Acute  





(7) Left leg cellulitis


Status: Acute  





(8) Left leg pain


Status: Acute  





(9) Right-sided heart failure


Status: Acute  





(10) Sepsis


Status: Acute  











Review of Systems


Respiratory:  No cough, No shortness of breath


Cardiac:  + edema, No chest pain, No orthopnea





Physical Exam


Vital Signs





Last Vital Signs Documentation








  Date Time  Temp Pulse Resp B/P (MAP) Pulse Ox O2 Delivery O2 Flow Rate FiO2


 


3/21/18 13:00  61 17 95/58 (65) 100   


 


3/21/18 12:00 36.3     Nasal Cannula 2.0 











Physical Exam


Constitutional:  


   General Apperance:  well-nourished


   Level of Distress:  NAD


Lungs:  


   Auscultation:  breath sounds normal, no wheezing, no rales/crackles, no 

rhonchi


Cardiovascular:  


   Heart Auscultation:  RRR, normal S1, normal S2, I/VI WSM


Peripheral Pulses:  


   Radial Pulse:  normal on the right


Abdomen:  


   Inspection & Palpation:  no tenderness, guarding & rebound, distended


Extremities:  edema (anasarca)





Assessment and Plan


Assessment and Plan


FINAL IMPRESSION:


1.  Gram-negative sepsis 


     -Potential sources include lower extremity cellulitis, SBP in the setting 

of ascites





2.  Biventricular systolic heart failure with moderate to severe right 

ventricular dilatation and dysfunction and indirect evidence of right 

ventricular volume overload on resting 2D transthoracic echo.





3.  Paroxysmal atrial fibrillation with rapid ventricular response with 

spontaneous conversion to normal sinus rhythm





4.  Elevated troponin secondary to demand ischemia in the setting of atrial 

fibrillation with rapid ventricular response, severe sepsis, lactic acidosis, 

and CHF.





5.  Change in mental status


    -No evidence of acute ischemia per MRI


    -Etiology not well defined, possibly related to severe sepsis





6.  Acute renal insufficiency


      -Related to sepsis, hypoperfusion, and contrast-induced nephropathy





7.  Lactic acidosis related to left lower extremity cellulitis and severe


sepsis.


 


PLAN AND RECOMMENDATIONS: 


Beta-blocker and calcium channel blocker therapy will remain on hold currently 

as patient remains in sinus rhythm with intermittent bradycardia.  Recommend 

prn IV Lopressor for recurrent episodes of atrial fibrillation as blood 

pressure allows. Patient remains in sinus rhythm.  Agree with intravenous 

heparin as patient is at high risk for recurrent atrial dysrhythmias.  Agree 

with intravenous hydration at this time given severe sepsis, and acute renal 

insufficiency.   Antibiotics and supportive care as per intensivist service.





Laboratory Results


Echo result 3/20/2018:


 The right ventricle is moderate to severely dilated.


  *  The right ventricular systolic function is severely reduced.


  *  The right atrium is severely dilated.


  *  There is trace tricuspid regurgitation.


  *  Flattened septum is consistent with RV volume overload.


  *  Left ventricular systolic function is moderately reduced.


  *  Ejection Fraction = 35-40%.


  *  Moderate global hypokinesis of the left ventricle.


  *  The inferior vena cava is severely dilated.


  *  Trivial loculated posterior pericardial effusion.


  *  There are no echocardiographic indications of cardiac tamponade.





Last 24 Hours








Test


  3/20/18


22:45 3/20/18


23:13 3/21/18


02:38 3/21/18


03:30


 


Stool Occult Blood NEGATIVE    


 


White Blood Count  16.42 K/uL   


 


Red Blood Count  3.89 M/uL   


 


Hemoglobin  10.9 g/dL   


 


Hematocrit  32.1 %   


 


Mean Corpuscular Volume  82.5 fL   


 


Mean Corpuscular Hemoglobin  28.0 pg   


 


Mean Corpuscular Hemoglobin


Concent 


  34.0 g/dl 


  


  


 


 


RDW Standard Deviation  39.9 fL   


 


RDW Coefficient of Variation  13.2 %   


 


Platelet Count  137 K/uL   


 


Mean Platelet Volume  12.0 fL   


 


Activated Partial


Thromboplast Time 


  45.5 SECONDS 


  


  


 


 


Partial Thromboplastin Ratio  1.8   


 


Sodium Level  135 mmol/L   


 


Potassium Level  4.2 mmol/L   


 


Chloride Level  105 mmol/L   


 


Carbon Dioxide Level  19 mmol/L   


 


Anion Gap  11.0 mmol/L   


 


Blood Urea Nitrogen  39 mg/dl   


 


Creatinine  1.95 mg/dl   


 


Est Creatinine Clear Calc


Drug Dose 


  23.8 ml/min 


  


  


 


 


Estimated GFR (


American) 


  31.6 


  


  


 


 


Estimated GFR (Non-


American 


  27.3 


  


  


 


 


BUN/Creatinine Ratio  19.9   


 


Random Glucose  101 mg/dl   


 


Lactic Acid Level  2.6 mmol/L   


 


Calcium Level  8.6 mg/dl   


 


Magnesium Level  1.9 mg/dl   


 


Total Bilirubin  1.2 mg/dl   


 


Aspartate Amino Transf


(AST/SGOT) 


  47 U/L 


  


  


 


 


Alanine Aminotransferase


(ALT/SGPT) 


  33 U/L 


  


  


 


 


Alkaline Phosphatase  58 U/L   


 


Troponin I  2.980 ng/ml   


 


Total Protein  5.7 gm/dl   


 


Albumin  2.4 gm/dl   


 


Globulin  3.3 gm/dl   


 


Albumin/Globulin Ratio  0.7   


 


Bedside Glucose   97 mg/dl  


 


Urine Opiates Screen    POS 


 


Urine Methadone, Qualitative    NEG 


 


Urine Barbiturates    NEG 


 


Urine Phencyclidine (PCP)


Level 


  


  


  NEG 


 


 


Ur


Amphetamine/Methamphetamine 


  


  


  NEG 


 


 


MDMA (Ecstasy) Screen    NEG 


 


Urine Benzodiazepines Screen    NEG 


 


Urine Cocaine Metabolite    NEG 


 


Urine Marijuana (THC)    NEG 


 


Test


  3/21/18


04:44 3/21/18


05:53 3/21/18


06:17 3/21/18


08:38


 


Creatine Kinase MB Ratio   3.8   


 


White Blood Count  18.13 K/uL   


 


Red Blood Count  4.46 M/uL   


 


Hemoglobin  12.3 g/dL   


 


Hematocrit  37.2 %   


 


Mean Corpuscular Volume  83.4 fL   


 


Mean Corpuscular Hemoglobin  27.6 pg   


 


Mean Corpuscular Hemoglobin


Concent 


  33.1 g/dl 


  


  


 


 


RDW Standard Deviation  39.8 fL   


 


RDW Coefficient of Variation  13.2 %   


 


Platelet Count  140 K/uL   


 


Mean Platelet Volume  12.1 fL   


 


Activated Partial


Thromboplast Time 


  56.7 SECONDS 


  


  52.9 SECONDS 


 


 


Partial Thromboplastin Ratio  2.2   2.0 


 


Sodium Level  133 mmol/L   


 


Potassium Level  4.8 mmol/L   


 


Chloride Level  105 mmol/L   


 


Carbon Dioxide Level  17 mmol/L   


 


Anion Gap  11.0 mmol/L   


 


Blood Urea Nitrogen  42 mg/dl   


 


Creatinine  2.26 mg/dl   


 


Est Creatinine Clear Calc


Drug Dose 


  22.7 ml/min 


  


  


 


 


Estimated GFR (


American) 


  26.5 


  


  


 


 


Estimated GFR (Non-


American 


  22.8 


  


  


 


 


BUN/Creatinine Ratio  18.7   


 


Random Glucose  92 mg/dl   


 


Lactic Acid Level  2.4 mmol/L   


 


Calcium Level  8.9 mg/dl   


 


Ionized Calcium  1.22 mmol/l   


 


Phosphorus Level  4.3 mg/dl   


 


Magnesium Level  2.6 mg/dl   


 


Total Bilirubin  1.5 mg/dl   


 


Aspartate Amino Transf


(AST/SGOT) 


  52 U/L 


  


  


 


 


Alanine Aminotransferase


(ALT/SGPT) 


  33 U/L 


  


  


 


 


Alkaline Phosphatase  62 U/L   


 


Ammonia  < 10.0 umol/L   


 


Total Creatine Kinase  187 U/L   


 


Creatine Kinase MB  7.1 ng/ml   


 


Troponin I  1.930 ng/ml   


 


Total Protein  6.2 gm/dl   


 


Albumin  2.7 gm/dl   


 


Globulin  3.5 gm/dl   


 


Albumin/Globulin Ratio  0.8   


 


Procalcitonin  > 200.00 ng/ml   


 


Random Cortisol  59.81 mcg/dl   


 


Bedside Glucose   84 mg/dl  


 


Test


  3/21/18


14:01

## 2018-03-21 NOTE — NEUROLOGY CONSULTATION
DATE OF CONSULTATION:  03/21/2018

 

REASON FOR CONSULTATION:  Altered mental status.

 

HISTORY OF PRESENT ILLNESS:  Mrs. Calixto is a 62-year-old who was previously

well, living in [place], she recently located to the area.  Her daughter

brought her into the hospital on 03/19/2018 for complaints of worsening

swelling of the left lower extremity.  She was febrile to 102 and had chills,

nausea and abdominal pain.  Her initial evaluation revealed mild

cardiomegaly, no abscess or soft tissue gas in the left lower extremity,

cellulitis not excluded.  CTA of the chest - no evidence of pulmonary

embolus, minimal subpleural nodularity, right upper lobe, pulmonary arterial

enlargement could suggest pulmonary hypertension and right heart enlargement,

anasarca.  The patient's blood cultures were positive for gram negative

bacilli and the patient was treated with antibiotic.  Last evening, the

patient went into atrial fibrillation with a rapid ventricular response. 

There are some recorded episodes of both bradycardia and tachycardia, low O2

sat and hypotension, lowest 74/58.  She was treated with intravenous

metoprolol, Cardizem, and intravenous heparin.  She converted to a normal

sinus rhythm this morning.  At some point this morning, there was a change in

her mental status where she was described as awake but unable to respond to

questions.  Her MRI of the brain, which I have reviewed with radiology was

limited by dental amalgam, but showed white matter signal in the brachium

pontis, most likely related to chronic small vessel vascular disease, no

acute intracranial process such as stroke, no evidence of hyperacute

hemorrhage.  On 03/20/2018, she had had a CT of the head which showed no

acute intracranial abnormality, gas within the cavernous sinus and soft

tissues of the face also likely intravascular likely related to injection

technique and catheter.  Radiologist that I discussed with Dr. Cain felt 
this

was intravenous, and that it would most likely be seen with an intravenous

injection.

 

LABORATORY DATA:  Her white count today is 18, H&H normal.  PTT currently

52.9, at baseline PT was 13 and INR 1.2, PTT 24.7.  Urinalysis on admission -

trace ketones, trace blood, 5-10 white cells, 5-10 hyaline casts, greater

than 30 epithelial cells.  Her tox screen was positive for opiates, negative

for ethanol.  I am assuming she received some narcotics for her significant

pain in the Emergency Room.  She did by the report of the intensivist

received one dose of tramadol.  Her chemistry profile is notable for new

renal insufficiency.  Ammonia less than 10.  Random cortisol 59, which is

elevated.  T3 is low, TSH 0.005.  Folate normal, B12 low at 184.

 

CURRENT MEDICATIONS:  Vancomycin, pantoprazole, cefepime, heparin, sodium

chloride, metronidazole, albumin, Tylenol, prochlorperazine; lorazepam

p.r.n., although it appears that she has not received that.

 

PAST MEDICAL HISTORY:  None.  Daughter indicates she has no history of

stroke.  No history of prior seizure.

 

SURGICAL HISTORY:  None.

 

FAMILY HISTORY:  No pertinent family history.

 

SOCIAL HISTORY:  She is single, does not smoke or drink.

 

MEDICATIONS:  No meds on admission.

 

ALLERGIES:  SULFA.

 

REVIEW OF SYSTEMS:  Unobtainable, but her daughter indicates she has

otherwise been well and active.

 

PHYSICAL EXAMINATION:

VITAL SIGNS:  36.3, 61, 18, 116/62, 100% O2 on 2 liters.

GENERAL:  The patient is very sleepy, arousable to voice, inattentive. 

Follows no commands.  Appreciates pain when palpating the head, moving the

arms, moving the legs.

NECK:  Appears supple.

NEUROLOGIC:  Pupils are miotic but reactive. Unable to visualize optic nerves. 
There is no fixed gaze

preference, although when in not entirely alert, gaze is disconjugate.  There

is no facial asymmetry.  The patient is mute. Grimaces to painful stimulartion  
Follows no commands.  There is

increased tone in the upper and to a lesser extent lower.  Reflexes are

symmetric.  Right toe up, left toe down.

 

IMPRESSION AND PLAN:  This patient appears to have a global encephalopathy. 

Her exam for the most part is nonlateralizing.  This could be the effect of a

global event such as a hypoxic ischemic event or metabolic encephalopathy. 
Brainstem stroke could behave similarly but would expect more localizing signs.

An electroencephalography will be performed this evening to rule out

subclinical seizures given that the MRI showed no acute abnormality such as

stroke or evidence of hypoxic ischemic injury.  I would recommend a followup

CT of the head in the morning to rule out hemorrhage.  Further optimization

of metabolic parameters is appropriate.  We could be seeing the effects of

sepsis, renal insufficiency, some global hypoperfusion.  We will follow with

you.

 GAUDENCIO Cohen MD

 

 

[facility]

## 2018-03-22 VITALS — OXYGEN SATURATION: 95 % | DIASTOLIC BLOOD PRESSURE: 95 MMHG | HEART RATE: 71 BPM | SYSTOLIC BLOOD PRESSURE: 161 MMHG

## 2018-03-22 VITALS
SYSTOLIC BLOOD PRESSURE: 153 MMHG | DIASTOLIC BLOOD PRESSURE: 89 MMHG | TEMPERATURE: 97.7 F | HEART RATE: 70 BPM | OXYGEN SATURATION: 94 %

## 2018-03-22 VITALS — DIASTOLIC BLOOD PRESSURE: 99 MMHG | OXYGEN SATURATION: 97 % | HEART RATE: 73 BPM | SYSTOLIC BLOOD PRESSURE: 149 MMHG

## 2018-03-22 VITALS — DIASTOLIC BLOOD PRESSURE: 95 MMHG | OXYGEN SATURATION: 95 % | HEART RATE: 76 BPM | SYSTOLIC BLOOD PRESSURE: 141 MMHG

## 2018-03-22 VITALS
SYSTOLIC BLOOD PRESSURE: 136 MMHG | TEMPERATURE: 98.24 F | DIASTOLIC BLOOD PRESSURE: 63 MMHG | OXYGEN SATURATION: 96 % | HEART RATE: 70 BPM

## 2018-03-22 VITALS — DIASTOLIC BLOOD PRESSURE: 66 MMHG | OXYGEN SATURATION: 96 % | HEART RATE: 73 BPM | SYSTOLIC BLOOD PRESSURE: 134 MMHG

## 2018-03-22 VITALS
TEMPERATURE: 98.24 F | DIASTOLIC BLOOD PRESSURE: 66 MMHG | OXYGEN SATURATION: 94 % | SYSTOLIC BLOOD PRESSURE: 117 MMHG | HEART RATE: 70 BPM

## 2018-03-22 VITALS — SYSTOLIC BLOOD PRESSURE: 115 MMHG | OXYGEN SATURATION: 97 % | DIASTOLIC BLOOD PRESSURE: 84 MMHG | HEART RATE: 79 BPM

## 2018-03-22 VITALS
OXYGEN SATURATION: 94 % | DIASTOLIC BLOOD PRESSURE: 77 MMHG | SYSTOLIC BLOOD PRESSURE: 144 MMHG | TEMPERATURE: 98.6 F | HEART RATE: 68 BPM

## 2018-03-22 VITALS — DIASTOLIC BLOOD PRESSURE: 63 MMHG | SYSTOLIC BLOOD PRESSURE: 109 MMHG | OXYGEN SATURATION: 96 % | HEART RATE: 67 BPM

## 2018-03-22 VITALS
OXYGEN SATURATION: 92 % | TEMPERATURE: 98.78 F | DIASTOLIC BLOOD PRESSURE: 96 MMHG | HEART RATE: 75 BPM | SYSTOLIC BLOOD PRESSURE: 186 MMHG

## 2018-03-22 VITALS — HEART RATE: 69 BPM | SYSTOLIC BLOOD PRESSURE: 135 MMHG | DIASTOLIC BLOOD PRESSURE: 72 MMHG | OXYGEN SATURATION: 95 %

## 2018-03-22 LAB
ALBUMIN SERPL-MCNC: 2.9 GM/DL (ref 3.4–5)
ALP SERPL-CCNC: 65 U/L (ref 45–117)
ALT SERPL-CCNC: 46 U/L (ref 12–78)
AST SERPL-CCNC: 51 U/L (ref 15–37)
BASOPHILS # BLD: 0.02 K/UL (ref 0–0.2)
BASOPHILS NFR BLD: 0.1 %
BUN SERPL-MCNC: 65 MG/DL (ref 7–18)
CALCIUM SERPL-MCNC: 8.8 MG/DL (ref 8.5–10.1)
CO2 SERPL-SCNC: 15 MMOL/L (ref 21–32)
CREAT SERPL-MCNC: 3.27 MG/DL (ref 0.6–1.2)
EOS ABS #: 0.02 K/UL (ref 0–0.5)
EOSINOPHIL NFR BLD AUTO: 182 K/UL (ref 130–400)
GLUCOSE SERPL-MCNC: 82 MG/DL (ref 70–99)
HCT VFR BLD CALC: 31 % (ref 37–47)
HGB BLD-MCNC: 10.5 G/DL (ref 12–16)
IG#: 0.2 K/UL (ref 0–0.02)
IMM GRANULOCYTES NFR BLD AUTO: 11.1 %
LYMPHOCYTES # BLD: 2.28 K/UL (ref 1.2–3.4)
MCH RBC QN AUTO: 27.9 PG (ref 25–34)
MCHC RBC AUTO-ENTMCNC: 33.9 G/DL (ref 32–36)
MCV RBC AUTO: 82.4 FL (ref 80–100)
MONO ABS #: 2.01 K/UL (ref 0.11–0.59)
MONOCYTES NFR BLD: 9.8 %
NEUT ABS #: 15.92 K/UL (ref 1.4–6.5)
NEUTROPHILS # BLD AUTO: 0.1 %
NEUTROPHILS NFR BLD AUTO: 77.9 %
NRBC BLD AUTO-RTO: 0.4 %
NUCLEATED RED BLOOD CELL ABS: 0.07 K/UL (ref 0–0)
PMV BLD AUTO: 11.5 FL (ref 7.4–10.4)
POTASSIUM SERPL-SCNC: 4.7 MMOL/L (ref 3.5–5.1)
PROT SERPL-MCNC: 6.4 GM/DL (ref 6.4–8.2)
PTT PATIENT: 55.3 SECONDS (ref 21–31)
RED CELL DISTRIBUTION WIDTH CV: 13.4 % (ref 11.5–14.5)
RED CELL DISTRIBUTION WIDTH SD: 40.8 FL (ref 36.4–46.3)
SODIUM SERPL-SCNC: 132 MMOL/L (ref 136–145)
WBC # BLD AUTO: 20.45 K/UL (ref 4.8–10.8)

## 2018-03-22 RX ADMIN — ALBUMIN HUMAN SCH GM: 250 SOLUTION INTRAVENOUS at 23:54

## 2018-03-22 RX ADMIN — METRONIDAZOLE SCH MLS/HR: 500 INJECTION, SOLUTION INTRAVENOUS at 21:51

## 2018-03-22 RX ADMIN — Medication SCH ML: at 08:26

## 2018-03-22 RX ADMIN — Medication SCH ML: at 21:49

## 2018-03-22 RX ADMIN — SODIUM BICARBONATE SCH MLS/HR: 84 INJECTION, SOLUTION INTRAVENOUS at 16:47

## 2018-03-22 RX ADMIN — PANTOPRAZOLE SODIUM SCH MLS/MIN: 40 INJECTION, POWDER, FOR SOLUTION INTRAVENOUS at 11:26

## 2018-03-22 RX ADMIN — VANCOMYCIN HYDROCHLORIDE SCH MG: 1 INJECTION, POWDER, LYOPHILIZED, FOR SOLUTION INTRAVENOUS at 13:51

## 2018-03-22 RX ADMIN — Medication SCH ML: at 13:51

## 2018-03-22 RX ADMIN — ALBUMIN HUMAN SCH GM: 250 SOLUTION INTRAVENOUS at 16:48

## 2018-03-22 RX ADMIN — VANCOMYCIN HYDROCHLORIDE SCH MG: 1 INJECTION, POWDER, LYOPHILIZED, FOR SOLUTION INTRAVENOUS at 16:47

## 2018-03-22 RX ADMIN — CEFEPIME SCH MLS/MIN: 2 INJECTION, POWDER, FOR SOLUTION INTRAVENOUS at 04:38

## 2018-03-22 RX ADMIN — VANCOMYCIN HYDROCHLORIDE SCH MG: 1 INJECTION, POWDER, LYOPHILIZED, FOR SOLUTION INTRAVENOUS at 21:49

## 2018-03-22 RX ADMIN — METRONIDAZOLE SCH MLS/HR: 500 INJECTION, SOLUTION INTRAVENOUS at 13:55

## 2018-03-22 RX ADMIN — ALBUMIN HUMAN SCH GM: 250 SOLUTION INTRAVENOUS at 11:26

## 2018-03-22 RX ADMIN — VANCOMYCIN HYDROCHLORIDE SCH MG: 1 INJECTION, POWDER, LYOPHILIZED, FOR SOLUTION INTRAVENOUS at 08:29

## 2018-03-22 RX ADMIN — METRONIDAZOLE SCH MLS/HR: 500 INJECTION, SOLUTION INTRAVENOUS at 05:49

## 2018-03-22 RX ADMIN — ALBUMIN HUMAN SCH GM: 250 SOLUTION INTRAVENOUS at 05:49

## 2018-03-22 RX ADMIN — Medication SCH ML: at 16:47

## 2018-03-22 RX ADMIN — HEPARIN SODIUM SCH MLS/HR: 5000 INJECTION, SOLUTION INTRAVENOUS at 08:15

## 2018-03-22 NOTE — NEPHROLOGY CONSULTATION
DATE OF CONSULTATION:  03/22/2018

 

ATTENDING OF RECORD:  Dr. Lny.

 

REASON FOR CONSULTATION:  BERNABE and oliguria.

 

HISTORY OF PRESENT ILLNESS:  This is a 62-year-old female who was doing well

with a significant past medical history of anemia who was only on vitamin D

supplementation, admits the last time she saw a doctor was about 2 years ago;

however, prior to that was regularly following with a physician who presents

with sepsis and found to have E. coli bacteremia as well as C. diff positive.

 Urine culture was negative.  Currently on ceftriaxone 1 gram IV daily as

well as oral vancomycin 500 mg 4 times a day and raspberry syrup 5 mL 4 times

a day and Flagyl 500 mg IV q. 8.  The patient currently on albumin 12.5 grams

IV q. 6, and I recently switched the patient from normal saline to half

normal with 75 mEq of bicarbonate at 75 mL an hour.  The patient had an

echocardiogram that showed an EF of 35-40%, moderate global hypokinesis of

the left ventricle, IVC is severely dilated, right ventricle is moderate to

severely dilated, right atrium is severely dilated, flattened septum

consistent with RV volume overload.  Cardiology evaluated the patient and is

planning on doing a right heart catheterization.  The patient also had an

episode of paroxysmal aFib with RVR, which spontaneously converted to normal

sinus.  GI evaluated the patient and platelets and INR borderline abnormal,

but did not feel that the patient had cirrhosis with the liver appearing

normal and CAT scan and LFTs only mildly elevated and there is an enough

ascites to consider doing a diagnostic paracentesis and ammonia levels are

normal.  TSH is undetectable, cortisol appears appropriate close to 60,

procalcitonin is greater than 200.  The patient's kidney function has

continued to worsen was 0.94 on admission and went up to 1.95 and up to 2.26

now up to 3.27.  Urine output has been trending down from 425 to 250 to 157. 

The patient is up 7 kilograms with an abdomen that is becoming more

distended.  The patient also becoming more confused, did have an ABG done

showed a pH of 7.27, pCO2 of 28, pO2 of 61 and a bicarbonate of 13.  Urine is

a very concentrated at this time.  INR was normal at 1.2.  White count has

been trending up.

 

PAST MEDICAL HISTORY:  Anemia.

 

PAST SURGICAL HISTORY:  None.

 

HOME MEDICATIONS:  Vitamin D supplementation.

 

FAMILY HISTORY:  Significant for hypertension.

 

SOCIAL HISTORY:  Nonsmoker, occasional alcohol, no drugs.  She is a retired

home health aide.

 

REVIEW OF SYSTEMS:  Positive weak, lethargic, distended abdomen.  Unable to

get a reliable 10-point review of systems.

 

CURRENT MEDICATIONS:  Ceftriaxone 1 gram IV daily, oral vancomycin 500 mg 4

times a day, half NS with 75 of bicarbonate at 75 mL an hour, Protonix 40 mg

IV daily, raspberry syrup 5 mL 4 times a day, heparin drip, Flagyl 500 IV q.

8, albumin 25% 12.5 grams IV q. 6 hours.

 

PHYSICAL EXAMINATION:

VITAL SIGNS:  Temperature 37, pulse 73, respiratory rate is 20, blood

pressure is 149/99, satting 97% on 2 liters.

GENERAL:  Awake, lethargic.

EYES:  No scleral icterus.

HEENT:  Moist mucous membranes.

NECK:  Supple.

PULMONARY:  Decreased breath sounds at the bases.

CARDIAC:  Regular rate and rhythm.

ABDOMEN:  Significantly distended.

EXTREMITIES:  +1 edema.

NEUROLOGICALLY:  Alert but lethargic.

 

LABORATORY DATA:  White count is 20, H&H 10 and 31, platelet count is 182. 

Blood gas shows a pH 7.27, pCO2 28, pO2 of 61 and bicarbonate of 13.  Urine

random sodium is pending.  Hepatitis negative.  Sodium level 132, potassium

4.7, chloride is 106, bicarbonate is 15, BUN 65, creatinine is 3.27, glucose

82, calcium is 8.8.  T-bili is 1, AST 51, ALT 46.  Troponin 1.3 and trending

down.  Albumin 2.9.

 

ASSESSMENT AND PLAN:  Acute kidney injury with oliguria in the setting of

acute tubular necrosis with escherichia coli bacteremia and Clostridium

difficile infection with significant right-sided heart failure, currently on

appropriate antibiotics, may eventually need dialysis.  Potassium levels are

stable; however, the patient is becoming more acidotic.  ABG shows

though that she is compensating for now, likely to need dialysis tomorrow. 

Will check INR again tomorrow and follow the lab work and consider starting

dialysis tomorrow if things continue to progress.  For now, potassium level

is fine.  The patient does have significant right-sided heart failure with

significant worsening abdominal distention in the setting of Clostridium

difficile and Escherichia coli bacteremia.  However, the patient is

oxygenating well on 2 liters nasal cannula and likely will need dialysis

tomorrow with possible fluid removal, depending on how well blood pressure

tolerates dialysis, if needed tomorrow.  Case was discussed with Dr. Sommers

of pulmonary critical care and discussed thoroughly with the family.  All

questions answered.  The patient with significant acute tubular necrosis who

likely has a possible infiltrative disease that may be affecting the heart. 

I would like to check SSA, SSB as well as SPEP and immunofixation, looking

for possible infiltrative granulomatous diseases that might be affecting the

heart and contributing the right-sided heart failure.  For now, will treat

the underlying Clostridium difficile and Escherichia coli bacteremia and

treat supportively with the possibility of needing a dialysis tomorrow, if

the clinical situation continues to deteriorate.

 

Appreciate consultation.

 

 

 

[facility]

## 2018-03-22 NOTE — CRITICAL CARE PROGRESS NOTE
Critical Care Progress Note


Date of Service


Mar 22, 2018.





Attending


Dr. Sommers





Subjective


The patient is more awake but continued to be confused and delirious although 

she is answering simple questions but she cannot carry a conversation.  Appears 

very lethargic.  She continue to maintain her airways.  Complaining of leg 

pain.  Abdominal pain also mainly in the pelvic area.  No nausea or vomiting.  

Remains n.p.o.





Objective


Her physical exam on 3/22/2018 revealed no fever, O2 saturation is 95%, blood 

pressure elevated to 186/96, probably due to agony.  Respiratory rate is 18.  

Positive JVP, S1-S2 regular rate and rhythm.  Distant breath sounds 

bilaterally.  Abdomen is distended and tender diffusely.  Left leg is more 

edematous than the right leg.  No pain.  Except below the knee area.  No calves 

pain.  Neurologically she is nonfocal but continued to be confused.





Assessment & Plan


1.  Sepsis secondary to gram-negative janice in the blood from UTI and C. 

difficile colitis.


2.  C. difficile colitis.


3.  UTI resulted in severe dehydration.


4.  Acute kidney insufficiency with a creatinine in the range of 5.


5.  Pulmonary hypertension and RV failure.


6.  Non-ST elevation MI in the face of the above type II.


7.  Metabolic acidosis secondary to acute renal failure.


8.  Left leg edema with retroperitoneal lymphadenopathy possibly contributing 

to the left leg edema.





Plan:





1.  Continue with current antibiotics including Vanco p.o. and ceftriaxone 

instead of cefepime given the fact patient had E. coli which was pansensitive.


2.  Appreciate renal consult,  started the patient on bicarb drip, 

potential hemodialysis if the patient continued to be oliguric.


3.  Appreciate Dr. Pedraza input, plan for PA catheter hopefully in the 

morning.  Evaluation for pulmonary hypertension.


4.  Add D5W to the IV fluid as the patient become hypoglycemic.  Part of it is 

related to the bacteremia.


5.  DVT prophylaxis.


6.  GI prophylaxis.


7.  Discussed with the daughter Dr. Byrd regarding her future plan.  She is in 

agreement with aggressive treatment we are providing.  She raised the question 

of possibility of connective tissue disease which needs to be addressed once 

the patient is more stable.  Workup was sent also by nephrology for 

scleroderma.  The patient does have features in her fingers suggestive of 

Raynaud's syndrome however I do not see any abnormality in the lung to account 

for her pulmonary hypertension.


8.  Electrolytes follow-up.


9.  Watch for leukocytosis.  As it is an indicator for progressing of C. 

difficile colitis.





Case discussed with the staff on rounds and details, critical care time spent 

with the patient was 60 minutes including discussion with the family.  All 

their questions been answered.





Data


Medications:





Current Inpatient Medications








 Medications


  (Trade)  Dose


 Ordered  Sig/Wilman


 Route  Start Time


 Stop Time Status Last Admin


Dose Admin


 


 Ioversol


  (Optiray 320)  100 ml  UD  PRN


 IV  3/20/18 01:30


 3/24/18 01:29   


 


 


 Acetaminophen


  (Tylenol Tab)  325 mg  Q6H  PRN


 PO  3/20/18 03:15


 4/19/18 03:14  3/20/18 23:29


325 MG


 


 Prochlorperazine


 Edisylate 5 mg/


 Syringe  5 ml @ 5


 mls/min  Q6H  PRN


 IV  3/20/18 03:15


 4/19/18 03:14   


 


 


 Lorazepam


  (Ativan Inj)  0.5 mg  Q4H  PRN


 IV  3/20/18 03:15


 4/19/18 03:14   


 


 


 Albumin Human


  (Albumin 25%)  12.5 gm  Q6H


 IV  3/20/18 12:00


 3/23/18 05:59  3/22/18 16:48


12.5 GM


 


 Metronidazole 500


 mg/Prmx  100 ml @ 


 100 mls/hr  Q8H


 IV  3/20/18 14:00


 3/30/18 13:59  3/22/18 13:55


100 MLS/HR


 


 Heparin Sodium/


 Dextrose  500 ml @ 


 12 mls/hr  Q24H


 IV  3/21/18 01:45


 4/20/18 01:44   


 


 


 Miscellaneous


 Information


  ([facility] Protocol


 For Hyperglycemia)  1 ea  PRN  PRN


 N/A  3/21/18 02:15


 3/23/18 02:14   


 


 


 Pantoprazole


 Sodium 40 mg/


 Syringe  10 ml @ 5


 mls/min  DAILY@11


 IV  3/21/18 11:00


 4/20/18 10:59  3/22/18 11:26


5 MLS/MIN


 


 Raspberry


  (Raspberry Syrup


 5ml Cup)  5 ml  QID


 PO  3/21/18 09:30


 4/4/18 09:29  3/22/18 16:47


5 ML


 


 Vancomycin HCl


  (Vancomycin Oral


 Soln)  500 mg  QID


 PO  3/22/18 17:00


 4/5/18 16:59  3/22/18 16:47


500 MG


 


 Ceftriaxone


 Sodium 1 gm/


 Dextrose  50 ml @ 


 100 mls/hr  Q24H


 IV  3/22/18 18:00


 4/1/18 17:59  3/22/18 16:47


100 MLS/HR


 


 Sodium


 Bicarbonate 75


 meq/Dextrose/


 Sodium Chloride  1,075 ml @ 


 75 mls/hr  [id number]


 IV  3/22/18 16:20


 4/21/18 16:19  3/22/18 16:47


75 MLS/HR








I & O:











24-Hour Column 


 


 3/23/18





 07:59


 


Intake Total 693 ml


 


Output Total 150 ml


 


Balance 543 ml








Vital Signs:











  Date Time  Temp Pulse Resp B/P (MAP) Pulse Ox O2 Delivery O2 Flow Rate FiO2


 


3/22/18 14:00  73 20 149/99 (116) 97 Nasal Cannula 2.0 


 


3/22/18 12:00  73 22 134/66 (88) 96 Nasal Cannula 2.0 


 


3/22/18 12:00     95 Room Air  


 


3/22/18 10:00  76 20 141/95 (110) 95 Nasal Cannula 2.0 


 


3/22/18 08:00 37.0 68 17 144/77 (99) 94 Nasal Cannula 2.0 


 


3/22/18 08:00      Room Air  


 


3/22/18 06:00  69 15 135/72 (93) 95 Nasal Cannula 2.0 


 


3/22/18 04:00     96 Nasal Cannula 2.0 


 


3/22/18 04:00 36.8 70 20 136/63 (87) 96 Nasal Cannula 2.0 


 


3/22/18 02:00  67 16 109/63 (78) 96 Nasal Cannula 2.0 


 


3/22/18 00:01 36.8 70 20 117/66 (83) 94 Nasal Cannula 2.0 


 


3/21/18 23:59     94 Nasal Cannula 2.0 


 


3/21/18 22:00  65 20 123/74 (90) 96 Nasal Cannula 2.0 


 


3/21/18 20:00 36.5 63 20 123/72 (89) 100 Nasal Cannula 2.0 


 


3/21/18 20:00     100 Nasal Cannula 2.0 








Laboratory Results:





Last 24 Hours








Test


  3/21/18


20:47 3/22/18


05:23 3/22/18


05:31 3/22/18


15:25


 


Bedside Glucose 85 mg/dl   78 mg/dl  65 mg/dl 


 


White Blood Count  20.45 K/uL   


 


Red Blood Count  3.76 M/uL   


 


Hemoglobin  10.5 g/dL   


 


Hematocrit  31.0 %   


 


Mean Corpuscular Volume  82.4 fL   


 


Mean Corpuscular Hemoglobin  27.9 pg   


 


Mean Corpuscular Hemoglobin


Concent 


  33.9 g/dl 


  


  


 


 


Platelet Count  182 K/uL   


 


Mean Platelet Volume  11.5 fL   


 


Neutrophils (%) (Auto)  77.9 %   


 


Lymphocytes (%) (Auto)  11.1 %   


 


Monocytes (%) (Auto)  9.8 %   


 


Eosinophils (%) (Auto)  0.1 %   


 


Basophils (%) (Auto)  0.1 %   


 


Neutrophils # (Auto)  15.92 K/uL   


 


Lymphocytes # (Auto)  2.28 K/uL   


 


Monocytes # (Auto)  2.01 K/uL   


 


Eosinophils # (Auto)  0.02 K/uL   


 


Basophils # (Auto)  0.02 K/uL   


 


RDW Standard Deviation  40.8 fL   


 


RDW Coefficient of Variation  13.4 %   


 


Immature Granulocyte % (Auto)  1.0 %   


 


Immature Granulocyte # (Auto)  0.20 K/uL   


 


Nucleated RBC Absolute Count


(auto) 


  0.07 K/uL 


  


  


 


 


Nucleated Red Blood Cells %  0.4 %   


 


Activated Partial


Thromboplast Time 


  55.3 SECONDS 


  


  


 


 


Partial Thromboplastin Ratio  2.1   


 


Sodium Level  132 mmol/L   


 


Potassium Level  4.7 mmol/L   


 


Chloride Level  106 mmol/L   


 


Carbon Dioxide Level  15 mmol/L   


 


Anion Gap  11.0 mmol/L   


 


Blood Urea Nitrogen  65 mg/dl   


 


Creatinine  3.27 mg/dl   


 


Est Creatinine Clear Calc


Drug Dose 


  15.8 ml/min 


  


  


 


 


Estimated GFR (


American) 


  16.9 


  


  


 


 


Estimated GFR (Non-


American 


  14.6 


  


  


 


 


BUN/Creatinine Ratio  19.9   


 


Random Glucose  82 mg/dl   


 


Lactic Acid Level  1.5 mmol/L   


 


Calcium Level  8.8 mg/dl   


 


Total Bilirubin  1.0 mg/dl   


 


Aspartate Amino Transf


(AST/SGOT) 


  51 U/L 


  


  


 


 


Alanine Aminotransferase


(ALT/SGPT) 


  46 U/L 


  


  


 


 


Alkaline Phosphatase  65 U/L   


 


Troponin I  1.300 ng/ml   


 


Total Protein  6.4 gm/dl   


 


Albumin  2.9 gm/dl   


 


Globulin  3.5 gm/dl   


 


Albumin/Globulin Ratio  0.8   


 


Procalcitonin  > 200.00 ng/ml   


 


Test


  3/22/18


16:00 3/22/18


16:01 


  


 


 


Urine Random Sodium 16 mEq/L    


 


Blood Gas Sample Site  L Radial   


 


Bedside Blood Gas pH (LAB)  7.27   


 


Bedside Blood Gas pCO2 (LAB)  28 mmHg   


 


Bedside Blood Gas pO2 (LAB)  61 mmHg   


 


Bedside Blood Gas HCO3 (LAB)  13 meq/L   


 


Bedside Blood Gas Total CO2  14 mEq/l   


 


Bedside Blood Gas Base Excess


(LAB) 


  -14.0 meq/L 


  


  


 


 


Bedside Blood Gas O2


Saturation 


  88.0 % 


  


  


 


 


Samm Test  Pass   


 


Oxygen Delivery Device  Cannula

## 2018-03-22 NOTE — NEUROLOGY PROGRESS NOTES
Neurology Progress Note


Date of Service


Mar 22, 2018.





Augusta Stephens is a 62 year old female who presented to the  ED with c/o L leg pain 

and swelling. She was also having  generalized body ache symptoms, nausea, 

lower abd pain and was febrile, L leg suspected to have cellulitis. Her blood 

culture is growing gram negative bacilli. She was started on  Cefepime, Flagyl, 

Doxycycline. She had imaging studies including CT chest/abd/pelvis which is 

concerning for R heart congestion, pulmonary HTN, anasarca, abd ascites.  CT 

hypoperfusion complex suspected and  enlarged retroperitoneal and pelvic lymph 

nodes unclear etiology and may be related to congestive change or malignancy/

lymphoproliferative disease. She moved several years ago to [email or web address] and 

lives with daughter. She used to be a [place] resident but lost insurance and hasn't 

had medical care for years. 


Today her daughter thinks she is more responsive. She is sitting up in bed.





Objective











  Date Time  Temp Pulse Resp B/P (MAP) Pulse Ox O2 Delivery O2 Flow Rate FiO2


 


3/22/18 14:00  73 20 149/99 (116) 97 Nasal Cannula 2.0 


 


3/22/18 12:00  73 22 134/66 (88) 96 Nasal Cannula 2.0 


 


3/22/18 12:00     95 Room Air  


 


3/22/18 10:00  76 20 141/95 (110) 95 Nasal Cannula 2.0 


 


3/22/18 08:00 37.0 68 17 144/77 (99) 94 Nasal Cannula 2.0 


 


3/22/18 08:00      Room Air  


 


3/22/18 06:00  69 15 135/72 (93) 95 Nasal Cannula 2.0 


 


3/22/18 04:00     96 Nasal Cannula 2.0 


 


3/22/18 04:00 36.8 70 20 136/63 (87) 96 Nasal Cannula 2.0 


 


3/22/18 02:00  67 16 109/63 (78) 96 Nasal Cannula 2.0 


 


3/22/18 00:01 36.8 70 20 117/66 (83) 94 Nasal Cannula 2.0 


 


3/21/18 23:59     94 Nasal Cannula 2.0 


 


3/21/18 22:00  65 20 123/74 (90) 96 Nasal Cannula 2.0 


 


3/21/18 20:00 36.5 63 20 123/72 (89) 100 Nasal Cannula 2.0 


 


3/21/18 20:00     100 Nasal Cannula 2.0 











Last 24 Hours








Test


  3/21/18


20:47 3/22/18


05:23 3/22/18


05:31 3/22/18


15:25


 


Bedside Glucose 85 mg/dl   78 mg/dl  65 mg/dl 


 


White Blood Count  20.45 K/uL   


 


Red Blood Count  3.76 M/uL   


 


Hemoglobin  10.5 g/dL   


 


Hematocrit  31.0 %   


 


Mean Corpuscular Volume  82.4 fL   


 


Mean Corpuscular Hemoglobin  27.9 pg   


 


Mean Corpuscular Hemoglobin


Concent 


  33.9 g/dl 


  


  


 


 


Platelet Count  182 K/uL   


 


Mean Platelet Volume  11.5 fL   


 


Neutrophils (%) (Auto)  77.9 %   


 


Lymphocytes (%) (Auto)  11.1 %   


 


Monocytes (%) (Auto)  9.8 %   


 


Eosinophils (%) (Auto)  0.1 %   


 


Basophils (%) (Auto)  0.1 %   


 


Neutrophils # (Auto)  15.92 K/uL   


 


Lymphocytes # (Auto)  2.28 K/uL   


 


Monocytes # (Auto)  2.01 K/uL   


 


Eosinophils # (Auto)  0.02 K/uL   


 


Basophils # (Auto)  0.02 K/uL   


 


RDW Standard Deviation  40.8 fL   


 


RDW Coefficient of Variation  13.4 %   


 


Immature Granulocyte % (Auto)  1.0 %   


 


Immature Granulocyte # (Auto)  0.20 K/uL   


 


Nucleated RBC Absolute Count


(auto) 


  0.07 K/uL 


  


  


 


 


Nucleated Red Blood Cells %  0.4 %   


 


Activated Partial


Thromboplast Time 


  55.3 SECONDS 


  


  


 


 


Partial Thromboplastin Ratio  2.1   


 


Sodium Level  132 mmol/L   


 


Potassium Level  4.7 mmol/L   


 


Chloride Level  106 mmol/L   


 


Carbon Dioxide Level  15 mmol/L   


 


Anion Gap  11.0 mmol/L   


 


Blood Urea Nitrogen  65 mg/dl   


 


Creatinine  3.27 mg/dl   


 


Est Creatinine Clear Calc


Drug Dose 


  15.8 ml/min 


  


  


 


 


Estimated GFR (


American) 


  16.9 


  


  


 


 


Estimated GFR (Non-


American 


  14.6 


  


  


 


 


BUN/Creatinine Ratio  19.9   


 


Random Glucose  82 mg/dl   


 


Lactic Acid Level  1.5 mmol/L   


 


Calcium Level  8.8 mg/dl   


 


Total Bilirubin  1.0 mg/dl   


 


Aspartate Amino Transf


(AST/SGOT) 


  51 U/L 


  


  


 


 


Alanine Aminotransferase


(ALT/SGPT) 


  46 U/L 


  


  


 


 


Alkaline Phosphatase  65 U/L   


 


Troponin I  1.300 ng/ml   


 


Total Protein  6.4 gm/dl   


 


Albumin  2.9 gm/dl   


 


Globulin  3.5 gm/dl   


 


Albumin/Globulin Ratio  0.8   


 


Procalcitonin  > 200.00 ng/ml   


 


Test


  3/22/18


16:00 3/22/18


16:01 


  


 


 


Blood Gas Sample Site  L Radial   


 


Bedside Blood Gas pH (LAB)  7.27   


 


Bedside Blood Gas pCO2 (LAB)  28 mmHg   


 


Bedside Blood Gas pO2 (LAB)  61 mmHg   


 


Bedside Blood Gas HCO3 (LAB)  13 meq/L   


 


Bedside Blood Gas Total CO2  14 mEq/l   


 


Bedside Blood Gas Base Excess


(LAB) 


  -14.0 meq/L 


  


  


 


 


Bedside Blood Gas O2


Saturation 


  88.0 % 


  


  


 


 


Samm Test  Pass   


 


Oxygen Delivery Device  Cannula   








Imaging:


no new imaging


Exam:


gen: alert NAD


lungs course breath sounds


CV RRR


does not follow commands or respond to simple questions


moves arms spontaneously





Current Inpatient Medications








 Medications


  (Trade)  Dose


 Ordered  Sig/Wilman


 Route  Start Time


 Stop Time Status Last Admin


Dose Admin


 


 Ioversol


  (Optiray 320)  100 ml  UD  PRN


 IV  3/20/18 01:30


 3/24/18 01:29   


 


 


 Acetaminophen


  (Tylenol Tab)  325 mg  Q6H  PRN


 PO  3/20/18 03:15


 4/19/18 03:14  3/20/18 23:29


325 MG


 


 Prochlorperazine


 Edisylate 5 mg/


 Syringe  5 ml @ 5


 mls/min  Q6H  PRN


 IV  3/20/18 03:15


 4/19/18 03:14   


 


 


 Lorazepam


  (Ativan Inj)  0.5 mg  Q4H  PRN


 IV  3/20/18 03:15


 4/19/18 03:14   


 


 


 Albumin Human


  (Albumin 25%)  12.5 gm  Q6H


 IV  3/20/18 12:00


 3/23/18 05:59  3/22/18 11:26


12.5 GM


 


 Metronidazole 500


 mg/Prmx  100 ml @ 


 100 mls/hr  Q8H


 IV  3/20/18 14:00


 3/30/18 13:59  3/22/18 13:55


100 MLS/HR


 


 Heparin Sodium/


 Dextrose  500 ml @ 


 12 mls/hr  Q24H


 IV  3/21/18 01:45


 4/20/18 01:44   


 


 


 Miscellaneous


 Information


  ([facility] Protocol


 For Hyperglycemia)  1 ea  PRN  PRN


 N/A  3/21/18 02:15


 3/23/18 02:14   


 


 


 Pantoprazole


 Sodium 40 mg/


 Syringe  10 ml @ 5


 mls/min  DAILY@11


 IV  3/21/18 11:00


 4/20/18 10:59  3/22/18 11:26


5 MLS/MIN


 


 Raspberry


  (Raspberry Syrup


 5ml Cup)  5 ml  QID


 PO  3/21/18 09:30


 4/4/18 09:29  3/22/18 13:51


5 ML


 


 Vancomycin HCl


  (Vancomycin Oral


 Soln)  500 mg  QID


 PO  3/22/18 17:00


 4/5/18 16:59   


 


 


 Ceftriaxone


 Sodium 1 gm/


 Dextrose  50 ml @ 


 100 mls/hr  Q24H


 IV  3/22/18 18:00


 4/1/18 17:59   


 


 


 Sodium


 Bicarbonate 75


 meq/Dextrose/


 Sodium Chloride  1,075 ml @ 


 75 mls/hr  [id number]


 IV  3/22/18 16:20


 4/21/18 16:19 UNV  


 


 


 Miscellaneous


 Information


  (Nursing Verbal


 Med Order)  1 ea  ONE  ONCE


 N/A  3/22/18 16:00


 3/22/18 16:01 UNV  


 











Impression


62 year old female with complex medical issues -confusion





Plan


1. medical management per primary team and ICU


2. repeat CT head is still pending


3. metabolic encephalopathy 


4. further recommendations to follow


I have seen and discussed above patient with Dr Nuria Cohen, neurology


Pt eeg last PM slow, but not epileptogenic. Pt is somewhat more interactive 

today, still little or no speech, opens eyes to voice, moves UE purposefully 

and LE to painful stim. Imp encephalopathy, polyfactorial rel sepsis, renal 

insuff, poss hypotension with hypoxemia, duration unknown. CT not done today, 

can be done tomorr. If pt fails to continue to improve consider repeat MRI 

brain .Will continue to follow, GAUDENCIO Cohen MD

## 2018-03-22 NOTE — CARDIOLOGY FOLLOW-UP
Subjective


General


Date of Service:


Mar 22, 2018.


Pt evaluation today including:  conversation w/ patient, conversation w/ family

, physical exam, chart review, lab review, review of studies, conversation w/ 

consultant, review of inpatient medication list





History of Present Illness


The patient is a 62 year old female seen in follow-up.


Mental status has improved today although she remains a poor historian.


Responding yes or no to verbal cues.


Denies chest pain or shortness of breath.


Minimal urine output overnight.


Creatinine continues to trend upward.


Remained hemodynamically stable.


Edema unchanged.


Critical care medicine requesting right heart catheterization.





Allergies


Coded Allergies:  


     Sulfa Antibiotics (Verified  Allergy, Unknown, swelling, 3/19/18)





Social History


Smoking Status:  Never Smoker


Hx Alcohol Use - Type And Amou:  No





Problem List


Medical Problems:


(1) Anasarca


Status: Acute  





(2) Atrial fibrillation with RVR


Status: Acute  





(3) C. difficile diarrhea


Status: Acute  





(4) Elevated troponin


Status: Acute  





(5) Hyperthyroidism


Status: Acute  





(6) Hypotension


Status: Acute  





(7) Left leg cellulitis


Status: Acute  





(8) Left leg pain


Status: Acute  





(9) Right-sided heart failure


Status: Acute  





(10) Sepsis


Status: Acute  











Review of Systems


Respiratory:  + dyspnea on exertion, No cough, No sputum, No wheezing, No 

shortness of breath, No dyspnea at rest


Cardiac:  + edema, No chest pain, No orthopnea, No PND, No claudication, No 

palpitations





Physical Exam


Vital Signs





Last Vital Signs Documentation








  Date Time  Temp Pulse Resp B/P (MAP) Pulse Ox O2 Delivery O2 Flow Rate FiO2


 


3/22/18 10:00  76 20 141/95 (110) 95 Nasal Cannula 2.0 


 


3/22/18 08:00 37.0       











Physical Exam


Constitutional:  


   General Apperance:  well-nourished


   Level of Distress:  NAD


Lungs:  


   Auscultation:  breath sounds normal, no wheezing, no rales/crackles, no 

rhonchi


Cardiovascular:  


   Heart Auscultation:  RRR, normal S1, normal S2, I/VI WSM


Peripheral Pulses:  


   Radial Pulse:  normal on the right


   Femoral Pulse:  normal on the right


Abdomen:  


   Inspection & Palpation:  no tenderness, guarding & rebound, distended


Extremities:  edema (anasarca)


Neurologic:  


   Gait & Station:  pertinent finding (No focal motor deficit)


   Cranial Nerves:  grossly intact





Assessment and Plan


Assessment and Plan


FINAL IMPRESSION:


1.  E. coli sepsis/bacteremia


     -Potential sources include lower extremity cellulitis, SBP in the setting 

of ascites





2.  Biventricular systolic heart failure (LVEF 35%) with moderate to severe 

right ventricular dilatation and dysfunction and indirect evidence of right 

ventricular volume overload on resting 2D transthoracic echo.





3.  Paroxysmal atrial fibrillation with rapid ventricular response with 

spontaneous conversion to normal sinus rhythm





4.  Elevated troponin secondary to demand ischemia in the setting of atrial 

fibrillation with rapid ventricular response, severe sepsis, lactic acidosis, 

and CHF.





5.  Change in mental status


    -No evidence of acute ischemia per MRI


    -Etiology not well defined, possibly related to severe sepsis


    -Mental status improved today





6.  Acute renal insufficiency with worsening creatinine


      -Related to sepsis, hypoperfusion, and contrast-induced nephropathy





7.  Lactic acidosis related to left lower extremity cellulitis and severe


sepsis.





8.  Hyperthyroidism 


 


PLAN AND RECOMMENDATIONS: 


I long discussion with critical care medicine regarding risk versus benefit of 

right heart catheterization.  I also discussed possible procedure with patient'

s daughter, Dr. Vivek Byrd.  Daughter gives consent for right heart 

catheterization.  Beta-blocker will remain on hold given sinus rhythm on 

telemetry and intermittent bradycardia.  Continue intravenous hydration pending 

review of right heart catheterization result.  Recommend intravenous Lopressor 

as needed for recurrent episodes of atrial fibrillation.  Intravenous heparin 

will be remain on hold currently.





Laboratory Results





Last 24 Hours








Test


  3/21/18


14:07 3/21/18


14:10 3/21/18


20:47 3/22/18


05:23


 


Bedside Glucose 98 mg/dl   85 mg/dl  


 


Sodium Level  132 mmol/L   132 mmol/L 


 


Potassium Level  4.9 mmol/L   4.7 mmol/L 


 


Chloride Level  106 mmol/L   106 mmol/L 


 


Carbon Dioxide Level  15 mmol/L   15 mmol/L 


 


Anion Gap  11.0 mmol/L   11.0 mmol/L 


 


Blood Urea Nitrogen  51 mg/dl   65 mg/dl 


 


Creatinine  2.68 mg/dl   3.27 mg/dl 


 


Est Creatinine Clear Calc


Drug Dose 


  19.2 ml/min 


  


  15.8 ml/min 


 


 


Estimated GFR (


American) 


  21.5 


  


  16.9 


 


 


Estimated GFR (Non-


American 


  18.6 


  


  14.6 


 


 


BUN/Creatinine Ratio  18.8   19.9 


 


Random Glucose  115 mg/dl   82 mg/dl 


 


Calcium Level  9.0 mg/dl   8.8 mg/dl 


 


White Blood Count    20.45 K/uL 


 


Red Blood Count    3.76 M/uL 


 


Hemoglobin    10.5 g/dL 


 


Hematocrit    31.0 % 


 


Mean Corpuscular Volume    82.4 fL 


 


Mean Corpuscular Hemoglobin    27.9 pg 


 


Mean Corpuscular Hemoglobin


Concent 


  


  


  33.9 g/dl 


 


 


Platelet Count    182 K/uL 


 


Mean Platelet Volume    11.5 fL 


 


Neutrophils (%) (Auto)    77.9 % 


 


Lymphocytes (%) (Auto)    11.1 % 


 


Monocytes (%) (Auto)    9.8 % 


 


Eosinophils (%) (Auto)    0.1 % 


 


Basophils (%) (Auto)    0.1 % 


 


Neutrophils # (Auto)    15.92 K/uL 


 


Lymphocytes # (Auto)    2.28 K/uL 


 


Monocytes # (Auto)    2.01 K/uL 


 


Eosinophils # (Auto)    0.02 K/uL 


 


Basophils # (Auto)    0.02 K/uL 


 


RDW Standard Deviation    40.8 fL 


 


RDW Coefficient of Variation    13.4 % 


 


Immature Granulocyte % (Auto)    1.0 % 


 


Immature Granulocyte # (Auto)    0.20 K/uL 


 


Nucleated RBC Absolute Count


(auto) 


  


  


  0.07 K/uL 


 


 


Nucleated Red Blood Cells %    0.4 % 


 


Activated Partial


Thromboplast Time 


  


  


  55.3 SECONDS 


 


 


Partial Thromboplastin Ratio    2.1 


 


Lactic Acid Level    1.5 mmol/L 


 


Total Bilirubin    1.0 mg/dl 


 


Aspartate Amino Transf


(AST/SGOT) 


  


  


  51 U/L 


 


 


Alanine Aminotransferase


(ALT/SGPT) 


  


  


  46 U/L 


 


 


Alkaline Phosphatase    65 U/L 


 


Troponin I    1.300 ng/ml 


 


Total Protein    6.4 gm/dl 


 


Albumin    2.9 gm/dl 


 


Globulin    3.5 gm/dl 


 


Albumin/Globulin Ratio    0.8 


 


Procalcitonin    > 200.00 ng/ml 


 


Test


  3/22/18


05:31 


  


  


 


 


Bedside Glucose 78 mg/dl

## 2018-03-22 NOTE — INFECT. DISEASE CONSULTATION
DATE OF CONSULTATION:  03/22/2018

 

HISTORY OF PRESENT ILLNESS:  This is a 62-year-old female who was admitted

secondary to worsening lower extremity pain.  She is initially from [facility], but has been spending time with her daughter here in the [facility].  Since admission to the hospital, she has had worsening creatinine and

worsening leukocytosis and also has been diagnosed with E. coli septicemia as

well as C. diff infection.  It is unclear if she was on antibiotics prior to

admission.  On my examination, the patient is confused and unable to provide

any history.  Her children are available at the bedside and states that she

has had an acute change in mental status since admission to the hospital. 

She has seen neurology and had an MRI of the brain which was unremarkable. 

Her initial CAT scan of the lower extremities was negative but did show soft

tissue swelling.  She also had a CAT scan of the abdomen and pelvis on the

20th of March, which did not show any ductal dilation and normal gallbladder.

 There was mild diffuse thickening of the colon, but no free air.

 

She has been on Rocephin and oral vancomycin and also Flagyl.  She does have

an NG tube in place.  She is n.p.o.  She has been afebrile since admission to

the hospital with the exception of a mild temperature of 37.6 upon arrival. 

Her family denies any recent urinary complaints or lower extremity

ulcerations.  Her remaining review of systems is limited.  Her only complaint

on my examination is of abdominal pain and pain in the left lower extremity. 

The remaining is limited, but negative.

 

PAST MEDICAL HISTORY:  Significant only for anemia.

 

PAST SURGICAL HISTORY:  There is no surgical history.

 

ALLERGIES:  SHE HAS AN ALLERGY TO SULFA.

 

FAMILY HISTORY:  Noncontributory.

 

SOCIAL HISTORY:  Negative for tobacco use, alcohol use or drug use.  She is

originally from [facility].

 

MEDICATIONS:  She is on Rocephin, oral vancomycin, Protonix, subQ heparin,

Flagyl, Tylenol, and Ativan.

 

PHYSICAL EXAMINATION:

VITAL SIGNS:  She is afebrile, pulse 73, respiratory rate 22, blood pressure

134/66, oxygen saturation is 96% on 2 liters.

GENERAL:  She is awake but confused.  She does answer some questions, but

also is speaking and not making sense.

HEENT:  Her mucous membranes are dry.  NG tube is in place.

HEART:  Regular.  She did develop aFib overnight.  I do not auscultate a

murmur.

LUNGS:  Decreased bilaterally.

ABDOMEN:  Distended and tender to light palpation.

EXTREMITIES:  There is minimal edema bilaterally.  There are no ulcerations

noted.

 

LABORATORY STUDIES:  CBC reveals a white blood cell count of 20.4, hemoglobin

10.5, and platelets are 182.  Chemistry panel reveals a sodium of 132,

potassium 4.9, chloride 106, bicarbonate 15, BUN 51; creatinine yesterday was

2.6, this is increased to 3.2 today.  Her lactic acid is improved to 1.5. 

Her procalcitonin is greater than 300.  Her troponin is positive at 1.3. 

LFTs this morning were normal with the exception of a mildly elevated AST at

51.  Urinalysis was unremarkable with 5-10 wbc's and no bacteria.  Urine drug

screen on the 21st was positive only for opiates.  Alcohol level was

negative.  On exam, hepatitis antibodies are negative.  Blood cultures again

are growing E. coli from the 19th with resistance only to ampicillin.  Repeat

blood cultures have not been obtained.  A C. diff was positive on the 20th. 

Urine culture on the 21st is negative.

 

IMAGING DATA:  Brain MRI was found was negative.  Liver ultrasound was

negative.  Abdominal CT is as above.  CT of the chest on the 20th was

negative.  Lower extremity CT on admission was significant only for soft

tissue edema.  She did have an echocardiogram on the 20th which was negative

for vegetation.

 

ASSESSMENT AND PLAN:

1. Escherichia coli septicemia.  I suspect GI translocation.  Repeat blood

cultures will be obtained.

2. Clostridium difficile colitis.  She is currently on maximized oral

therapy.  If she continues to have worsening leukocytosis or abdominal

distention, a repeat CAT scan and potentially a surgical evaluation would be

suggested.

3. Leukocytosis.  This will be followed.  We will follow along with you.

 

Thank you for this consultation.

## 2018-03-23 VITALS — DIASTOLIC BLOOD PRESSURE: 109 MMHG | OXYGEN SATURATION: 95 % | HEART RATE: 80 BPM | SYSTOLIC BLOOD PRESSURE: 192 MMHG

## 2018-03-23 VITALS — DIASTOLIC BLOOD PRESSURE: 98 MMHG | OXYGEN SATURATION: 98 % | SYSTOLIC BLOOD PRESSURE: 183 MMHG | HEART RATE: 82 BPM

## 2018-03-23 VITALS — HEART RATE: 125 BPM | OXYGEN SATURATION: 94 % | SYSTOLIC BLOOD PRESSURE: 138 MMHG | DIASTOLIC BLOOD PRESSURE: 86 MMHG

## 2018-03-23 VITALS — OXYGEN SATURATION: 90 % | HEART RATE: 117 BPM

## 2018-03-23 VITALS — OXYGEN SATURATION: 92 % | HEART RATE: 164 BPM

## 2018-03-23 VITALS — OXYGEN SATURATION: 93 % | HEART RATE: 126 BPM

## 2018-03-23 VITALS — DIASTOLIC BLOOD PRESSURE: 121 MMHG | SYSTOLIC BLOOD PRESSURE: 157 MMHG | OXYGEN SATURATION: 96 % | HEART RATE: 85 BPM

## 2018-03-23 VITALS — HEART RATE: 119 BPM | OXYGEN SATURATION: 94 %

## 2018-03-23 VITALS — HEART RATE: 114 BPM | DIASTOLIC BLOOD PRESSURE: 89 MMHG | OXYGEN SATURATION: 92 % | SYSTOLIC BLOOD PRESSURE: 172 MMHG

## 2018-03-23 VITALS — HEART RATE: 95 BPM | DIASTOLIC BLOOD PRESSURE: 82 MMHG | OXYGEN SATURATION: 89 % | SYSTOLIC BLOOD PRESSURE: 178 MMHG

## 2018-03-23 VITALS — OXYGEN SATURATION: 92 % | HEART RATE: 119 BPM

## 2018-03-23 VITALS — DIASTOLIC BLOOD PRESSURE: 81 MMHG | SYSTOLIC BLOOD PRESSURE: 182 MMHG | OXYGEN SATURATION: 96 % | HEART RATE: 95 BPM

## 2018-03-23 VITALS — HEART RATE: 115 BPM

## 2018-03-23 VITALS — OXYGEN SATURATION: 90 % | HEART RATE: 143 BPM

## 2018-03-23 VITALS — OXYGEN SATURATION: 98 % | HEART RATE: 111 BPM

## 2018-03-23 VITALS — HEART RATE: 79 BPM | OXYGEN SATURATION: 98 %

## 2018-03-23 VITALS — OXYGEN SATURATION: 90 % | HEART RATE: 121 BPM

## 2018-03-23 VITALS — HEART RATE: 132 BPM | OXYGEN SATURATION: 91 %

## 2018-03-23 VITALS
OXYGEN SATURATION: 95 % | TEMPERATURE: 97.7 F | DIASTOLIC BLOOD PRESSURE: 79 MMHG | HEART RATE: 77 BPM | SYSTOLIC BLOOD PRESSURE: 164 MMHG

## 2018-03-23 VITALS — HEART RATE: 109 BPM

## 2018-03-23 VITALS — OXYGEN SATURATION: 94 % | HEART RATE: 131 BPM

## 2018-03-23 VITALS — HEART RATE: 136 BPM | OXYGEN SATURATION: 91 %

## 2018-03-23 VITALS — DIASTOLIC BLOOD PRESSURE: 95 MMHG | OXYGEN SATURATION: 97 % | HEART RATE: 80 BPM | SYSTOLIC BLOOD PRESSURE: 184 MMHG

## 2018-03-23 VITALS
OXYGEN SATURATION: 94 % | SYSTOLIC BLOOD PRESSURE: 177 MMHG | HEART RATE: 80 BPM | DIASTOLIC BLOOD PRESSURE: 87 MMHG | TEMPERATURE: 97.7 F

## 2018-03-23 VITALS — HEART RATE: 100 BPM | OXYGEN SATURATION: 90 %

## 2018-03-23 VITALS — OXYGEN SATURATION: 92 % | HEART RATE: 124 BPM

## 2018-03-23 VITALS — HEART RATE: 106 BPM | OXYGEN SATURATION: 94 % | DIASTOLIC BLOOD PRESSURE: 83 MMHG | SYSTOLIC BLOOD PRESSURE: 175 MMHG

## 2018-03-23 VITALS — OXYGEN SATURATION: 93 % | HEART RATE: 105 BPM

## 2018-03-23 VITALS — HEART RATE: 134 BPM | TEMPERATURE: 98.06 F | OXYGEN SATURATION: 93 %

## 2018-03-23 VITALS — OXYGEN SATURATION: 100 % | HEART RATE: 84 BPM

## 2018-03-23 VITALS — OXYGEN SATURATION: 92 % | HEART RATE: 132 BPM

## 2018-03-23 VITALS
SYSTOLIC BLOOD PRESSURE: 174 MMHG | HEART RATE: 73 BPM | DIASTOLIC BLOOD PRESSURE: 99 MMHG | TEMPERATURE: 97.52 F | OXYGEN SATURATION: 95 %

## 2018-03-23 VITALS — OXYGEN SATURATION: 100 % | HEART RATE: 112 BPM

## 2018-03-23 VITALS — SYSTOLIC BLOOD PRESSURE: 165 MMHG | OXYGEN SATURATION: 97 % | HEART RATE: 74 BPM | DIASTOLIC BLOOD PRESSURE: 86 MMHG

## 2018-03-23 VITALS — OXYGEN SATURATION: 85 % | HEART RATE: 98 BPM

## 2018-03-23 VITALS — HEART RATE: 121 BPM | OXYGEN SATURATION: 92 %

## 2018-03-23 VITALS — SYSTOLIC BLOOD PRESSURE: 158 MMHG | OXYGEN SATURATION: 92 % | HEART RATE: 107 BPM | DIASTOLIC BLOOD PRESSURE: 76 MMHG

## 2018-03-23 VITALS — OXYGEN SATURATION: 90 % | HEART RATE: 127 BPM

## 2018-03-23 VITALS
DIASTOLIC BLOOD PRESSURE: 84 MMHG | HEART RATE: 76 BPM | SYSTOLIC BLOOD PRESSURE: 162 MMHG | TEMPERATURE: 98.24 F | OXYGEN SATURATION: 95 %

## 2018-03-23 VITALS — HEART RATE: 80 BPM | OXYGEN SATURATION: 98 %

## 2018-03-23 VITALS — HEART RATE: 129 BPM | OXYGEN SATURATION: 91 %

## 2018-03-23 VITALS — OXYGEN SATURATION: 92 % | HEART RATE: 140 BPM

## 2018-03-23 VITALS — OXYGEN SATURATION: 91 % | HEART RATE: 126 BPM

## 2018-03-23 VITALS — HEART RATE: 133 BPM | OXYGEN SATURATION: 92 %

## 2018-03-23 VITALS — OXYGEN SATURATION: 91 % | HEART RATE: 147 BPM

## 2018-03-23 VITALS — TEMPERATURE: 97.88 F

## 2018-03-23 LAB
ALBUMIN SERPL-MCNC: 3.3 GM/DL (ref 3.4–5)
ALP SERPL-CCNC: 96 U/L (ref 45–117)
ALT SERPL-CCNC: 43 U/L (ref 12–78)
AST SERPL-CCNC: 44 U/L (ref 15–37)
BUN SERPL-MCNC: 73 MG/DL (ref 7–18)
BUN SERPL-MCNC: 73 MG/DL (ref 7–18)
CALCIUM SERPL-MCNC: 8.8 MG/DL (ref 8.5–10.1)
CALCIUM SERPL-MCNC: 8.8 MG/DL (ref 8.5–10.1)
CO2 SERPL-SCNC: 16 MMOL/L (ref 21–32)
CO2 SERPL-SCNC: 19 MMOL/L (ref 21–32)
CREAT SERPL-MCNC: 2.19 MG/DL (ref 0.6–1.2)
CREAT SERPL-MCNC: 2.62 MG/DL (ref 0.6–1.2)
EOSINOPHIL NFR BLD AUTO: 209 K/UL (ref 130–400)
GLUCOSE SERPL-MCNC: 101 MG/DL (ref 70–99)
GLUCOSE SERPL-MCNC: 110 MG/DL (ref 70–99)
HCT VFR BLD CALC: 32.7 % (ref 37–47)
HGB BLD-MCNC: 11.2 G/DL (ref 12–16)
INR PPP: 1.2 (ref 0.9–1.1)
MCH RBC QN AUTO: 28.3 PG (ref 25–34)
MCHC RBC AUTO-ENTMCNC: 34.3 G/DL (ref 32–36)
MCV RBC AUTO: 82.6 FL (ref 80–100)
NRBC BLD AUTO-RTO: 0.4 %
NUCLEATED RED BLOOD CELL ABS: 0.08 K/UL (ref 0–0)
PHOSPHATE SERPL-MCNC: 4.5 MG/DL (ref 2.5–4.9)
PMV BLD AUTO: 11.9 FL (ref 7.4–10.4)
POTASSIUM SERPL-SCNC: 4.2 MMOL/L (ref 3.5–5.1)
POTASSIUM SERPL-SCNC: 4.5 MMOL/L (ref 3.5–5.1)
PROT SERPL-MCNC: 6.8 GM/DL (ref 6.4–8.2)
PTT PATIENT: 41.7 SECONDS (ref 21–31)
PTT PATIENT: 45.5 SECONDS (ref 21–31)
RED CELL DISTRIBUTION WIDTH CV: 13.7 % (ref 11.5–14.5)
RED CELL DISTRIBUTION WIDTH SD: 41.6 FL (ref 36.4–46.3)
SODIUM SERPL-SCNC: 134 MMOL/L (ref 136–145)
SODIUM SERPL-SCNC: 138 MMOL/L (ref 136–145)
WBC # BLD AUTO: 22.95 K/UL (ref 4.8–10.8)

## 2018-03-23 RX ADMIN — HEPARIN SODIUM SCH MLS/HR: 5000 INJECTION, SOLUTION INTRAVENOUS at 11:56

## 2018-03-23 RX ADMIN — METRONIDAZOLE SCH MLS/HR: 500 INJECTION, SOLUTION INTRAVENOUS at 05:29

## 2018-03-23 RX ADMIN — METOPROLOL TARTRATE SCH MG: 5 INJECTION, SOLUTION INTRAVENOUS at 17:10

## 2018-03-23 RX ADMIN — METRONIDAZOLE SCH MLS/HR: 500 INJECTION, SOLUTION INTRAVENOUS at 20:56

## 2018-03-23 RX ADMIN — HEPARIN SODIUM SCH MLS/HR: 5000 INJECTION, SOLUTION INTRAVENOUS at 19:43

## 2018-03-23 RX ADMIN — HYDROMORPHONE HYDROCHLORIDE PRN MG: 1 INJECTION, SOLUTION INTRAMUSCULAR; INTRAVENOUS; SUBCUTANEOUS at 18:14

## 2018-03-23 RX ADMIN — CEFTRIAXONE SODIUM SCH MLS/HR: 1 INJECTION, POWDER, FOR SOLUTION INTRAVENOUS at 18:13

## 2018-03-23 RX ADMIN — Medication SCH ML: at 17:10

## 2018-03-23 RX ADMIN — SODIUM BICARBONATE SCH MLS/HR: 84 INJECTION, SOLUTION INTRAVENOUS at 06:43

## 2018-03-23 RX ADMIN — Medication SCH ML: at 13:39

## 2018-03-23 RX ADMIN — VANCOMYCIN HYDROCHLORIDE SCH MG: 1 INJECTION, POWDER, LYOPHILIZED, FOR SOLUTION INTRAVENOUS at 19:48

## 2018-03-23 RX ADMIN — PANTOPRAZOLE SODIUM SCH MLS/MIN: 40 INJECTION, POWDER, FOR SOLUTION INTRAVENOUS at 11:53

## 2018-03-23 RX ADMIN — VANCOMYCIN HYDROCHLORIDE SCH MG: 1 INJECTION, POWDER, LYOPHILIZED, FOR SOLUTION INTRAVENOUS at 13:39

## 2018-03-23 RX ADMIN — Medication SCH ML: at 19:48

## 2018-03-23 RX ADMIN — VANCOMYCIN HYDROCHLORIDE SCH MG: 1 INJECTION, POWDER, LYOPHILIZED, FOR SOLUTION INTRAVENOUS at 08:00

## 2018-03-23 RX ADMIN — SODIUM BICARBONATE SCH MLS/HR: 84 INJECTION, SOLUTION INTRAVENOUS at 20:56

## 2018-03-23 RX ADMIN — VANCOMYCIN HYDROCHLORIDE SCH MG: 1 INJECTION, POWDER, LYOPHILIZED, FOR SOLUTION INTRAVENOUS at 17:10

## 2018-03-23 RX ADMIN — METOPROLOL TARTRATE SCH MG: 5 INJECTION, SOLUTION INTRAVENOUS at 23:28

## 2018-03-23 RX ADMIN — HEPARIN SODIUM SCH MLS/HR: 5000 INJECTION, SOLUTION INTRAVENOUS at 01:45

## 2018-03-23 RX ADMIN — METRONIDAZOLE SCH MLS/HR: 500 INJECTION, SOLUTION INTRAVENOUS at 13:39

## 2018-03-23 RX ADMIN — Medication SCH ML: at 08:00

## 2018-03-23 NOTE — CRITICAL CARE PROGRESS NOTE
Critical Care Progress Note


Date of Service


Mar 23, 2018.





Attending


Dr. Sommers





Subjective


The patient had episodes of tachycardia overnight, the patient remains confused

, in the morning the patient tachycardia has been persistent in the range of 160

, appeared to be in rapid A. fib, the patient was started on Cardizem drip.  

The patient denies any abdominal pain or shortness of breath although she has 

been occasionally tachypneic, fluid overloaded as well.  Review of system was 

limited due to the patient confusion.





Objective


Her physical exam on 3/22/2018 revealed no fever, O2 saturation is 95%, blood 

pressure elevated to 186/96, probably due to agony.  Respiratory rate is 18.  

Positive JVP, S1-S2 regular rate and rhythm.  Distant breath sounds 

bilaterally.  Abdomen is distended and tender diffusely.  Left leg is more 

edematous than the right leg.  No pain.  Except below the knee area.  No calves 

pain.  Neurologically she is nonfocal but continued to be confused.





Her physical exam on 3/23/2018 revealed, no fever but her O2 saturation is 

variable between 88-93% on nasal cannula currently on oxygen mask.  The patient 

heart examination S1-S2 irregularly irregular with tachycardia accompanied with 

areas of pauses.  Bilateral lower ex extremity edema, the lungs actually with 

bilateral diminished breath sounds and minimal crackles.  Abdomen is still 

distended and tender mainly in the left lower quadrant.





Her labs were reviewed as well as a CAT scan of the abdomen which showed bowel 

wall thickening mainly in the ascending colon, ascites was noted and anasarca.  

Bilateral pleural effusion with compressive atelectasis also noted.





Assessment & Plan


1.  Sepsis secondary to gram-negative janice in the blood from UTI and C. 

difficile colitis.


2.  C. difficile colitis.


3.  UTI resulted in severe dehydration.  After hydrating the patient, the 

patient currently is fluid overloaded.


4.  Acute kidney insufficiency with a creatinine in the range of 3.6.  

Improving to creatinine of 2.6, the patient started becoming less oliguric, her 

urine output is supported now by IV bicarb with a rate of 50 mL an hour as well 

as responded to the Lasix.


5.  Pulmonary hypertension and RV failure.


6.  Non-ST elevation MI in the face of the above type II.


7.  Metabolic acidosis secondary to acute renal failure.


8.  Left leg edema with retroperitoneal lymphadenopathy possibly contributing 

to the left leg edema.


9.  Sinus pause noted on multiple occasions while she is on Cardizem drip, the 

patient remains in and out A. fib, Cardizem drip was stopped, Dr. Pedraza from 

cardiology evaluated the patient, electrolytes were sent, appreciate his input.

  Pacer pads were placed.





Plan:





1.  Continue with current antibiotics including Vanco p.o. and ceftriaxone 

instead of cefepime given the fact patient had E. coli which was pansensitive.  

I have increased the dose to 2 g daily due to bacteremia.


2.  Appreciate renal consult,  started the patient on bicarb drip, will 

hold off on dialysis, induce the patient with Lasix in which she responded well 

and was able to diurese approximately 1400 mL.


3.  Appreciate Dr. Pedraza input, plan for PA catheter was canceled due to the 

patient current events with rapid A. fib, evaluation can be taken place for her 

pulmonary hypertension while she is stable.


4.  The patient IV fluid has been reduced to 50 mL of half bicarb and saline 

with D5W.


5.  DVT prophylaxis, however, the patient started on heparin drip for A. fib..


6.  GI prophylaxis.


7.  Consult surgery, Dr. Ricardo input appreciated.  Just in case the patient 

intra-abdominal pressure has increased to the point that she would need 

laparotomy.


8.  We will transfuse intra-abdominal pressure every shift.


9.  I will stop Ativan due to the patient renal failure.


10.  Continue with Dilaudid for pain control.


11.  Her pH has improved to 7.31 and possibly we can stop the bicarb later.


12.  Diltiazem was stopped although it was started after bolus to control her 

heart rate.


13.  Abdominal CT was done and results were noted and reviewed personally.


14.  Watch for leukocytosis.  As it is an indicator for progressing of C. 

difficile colitis.





Case discussed with the staff on rounds and details, critical care time spent 

with the patient was 60 minutes including discussion with the family.  All 

their questions been answered.





Data


Medications:





Current Inpatient Medications








 Medications


  (Trade)  Dose


 Ordered  Sig/Wilman


 Route  Start Time


 Stop Time Status Last Admin


Dose Admin


 


 Ioversol


  (Optiray 320)  100 ml  UD  PRN


 IV  3/20/18 01:30


 3/24/18 01:29   


 


 


 Acetaminophen


  (Tylenol Tab)  325 mg  Q6H  PRN


 PO  3/20/18 03:15


 4/19/18 03:14  3/20/18 23:29


325 MG


 


 Prochlorperazine


 Edisylate 5 mg/


 Syringe  5 ml @ 5


 mls/min  Q6H  PRN


 IV  3/20/18 03:15


 4/19/18 03:14   


 


 


 Metronidazole 500


 mg/Prmx  100 ml @ 


 100 mls/hr  Q8H


 IV  3/20/18 14:00


 3/30/18 13:59  3/23/18 05:29


100 MLS/HR


 


 Pantoprazole


 Sodium 40 mg/


 Syringe  10 ml @ 5


 mls/min  DAILY@11


 IV  3/21/18 11:00


 4/20/18 10:59  3/23/18 11:53


5 MLS/MIN


 


 Raspberry


  (Raspberry Syrup


 5ml Cup)  5 ml  QID


 PO  3/21/18 09:30


 4/4/18 09:29  3/23/18 08:00


5 ML


 


 Vancomycin HCl


  (Vancomycin Oral


 Soln)  500 mg  QID


 PO  3/22/18 17:00


 4/5/18 16:59  3/23/18 08:00


500 MG


 


 Sodium


 Bicarbonate 75


 meq/Dextrose/


 Sodium Chloride  1,075 ml @ 


 75 mls/hr  [id number]


 IV  3/22/18 16:20


 4/21/18 16:19  3/23/18 06:43


75 MLS/HR


 


 Diltiazem HCl 125


 mg/Dextrose  125 ml @ 0


 mls/hr  Q0M PRN


 IV  3/23/18 08:15


 4/22/18 08:14  3/23/18 09:09


5 MLS/HR


 


 Hydromorphone HCl


  (Dilaudid Inj)  0.5 mg  Q3H  PRN


 IV  3/23/18 10:45


 4/6/18 10:44   


 


 


 Ceftriaxone


 Sodium 2 gm/


 Dextrose  50 ml @ 


 100 mls/hr  Q24H


 IV  3/23/18 18:00


 4/1/18 17:59   


 


 


 Heparin Sodium/


 Dextrose  500 ml @ 


 12 mls/hr  Q24H


 IV  3/23/18 11:30


 4/22/18 11:29  3/23/18 11:56


12 MLS/HR








Vital Signs:











  Date Time  Temp Pulse Resp B/P (MAP) Pulse Ox O2 Delivery O2 Flow Rate FiO2


 


3/23/18 13:01  114 23 172/89 (116) 92 Oxymask 5.0 


 


3/23/18 13:00  126 25  93   


 


3/23/18 12:50  106 23 175/83 (113) 94   


 


3/23/18 12:46  119 25  92 Oxymask 5.0 


 


3/23/18 12:31  127 24  90   


 


3/23/18 12:30  121 22  90 Oxymask 5.0 


 


3/23/18 12:16  132 21  92   


 


3/23/18 12:01 36.7 134 22  93 Oxymask 5.0 


 


3/23/18 12:00  124   92   


 


3/23/18 11:47  124 22  92 Oxymask 5.0 


 


3/23/18 11:31  132 30  91   


 


3/23/18 11:30  140 19  92   


 


3/23/18 11:16  143 31  90 Oxymask 5.0 


 


3/23/18 11:01  98 18  85   


 


3/23/18 11:00  117 36  90 Oxymask 5.0 


 


3/23/18 10:31  136 20  91   


 


3/23/18 10:30  133 23  92 Oxymask 5.0 


 


3/23/18 10:16  164 29  92   


 


3/23/18 10:13  121 27  92 Oxymask 5.0 


 


3/23/18 10:09  109 25     


 


3/23/18 09:31  111 22  98 Oxymask 5.0 


 


3/23/18 09:30  112 23  100   


 


3/23/18 09:16  129 24  91 Oxymask 5.0 


 


3/23/18 09:01  131 26  94   


 


3/23/18 09:00  119 31  94 Oxymask 5.0 


 


3/23/18 08:46  126 31  91   


 


3/23/18 08:30  147 25  91 Oxymask 5.0 


 


3/23/18 08:16  115 28     


 


3/23/18 08:02  105 28  93 Oxymask 5.0 


 


3/23/18 08:01  100 28  90   


 


3/23/18 08:00  107 27 158/76 (103) 92 Oxymask 5.0 


 


3/23/18 07:45 36.6       


 


3/23/18 07:30  84 22  100   


 


3/23/18 07:01  80 20  98 Oxymask 5.0 


 


3/23/18 07:00  79 16  98   


 


3/23/18 06:00  80 20 184/95 (124) 97 Oxymask 5.0 


 


3/23/18 04:00 36.5 77 14 164/79 (107) 95 Oxymask 5.0 


 


3/23/18 04:00      Oxymask 5.0 


 


3/23/18 02:00  80 22 192/109 (136) 95 Oxymask 4.0 


 


3/23/18 00:01 36.4 73 18 174/99 (124) 95 Oxymask 4.0 


 


3/22/18 23:59      Oxymask 4.0 


 


3/22/18 22:00  79 17 115/84 (94) 97 Oxymask 4.0 


 


3/22/18 20:00 36.5 70 19 153/89 (110) 94 Oxymask 4.0 


 


3/22/18 20:00     94 Oxymask 4.0 


 


3/22/18 18:00  71 26 161/95 (117) 95 Nasal Cannula 6.0 


 


3/22/18 16:00 37.1 75 20 186/96 (126) 92 Nasal Cannula 2.0 


 


3/22/18 16:00     95 Nasal Cannula 2.0 


 


3/22/18 14:00  73 20 149/99 (116) 97 Nasal Cannula 2.0 








Laboratory Results:





Last 24 Hours








Test


  3/22/18


15:25 3/22/18


16:00 3/22/18


16:01 3/22/18


21:43


 


Bedside Glucose 65 mg/dl    96 mg/dl 


 


Urine Random Sodium  16 mEq/L   


 


Blood Gas Sample Site   L Radial  


 


Bedside Blood Gas pH (LAB)   7.27  


 


Bedside Blood Gas pCO2 (LAB)   28 mmHg  


 


Bedside Blood Gas pO2 (LAB)   61 mmHg  


 


Bedside Blood Gas HCO3 (LAB)   13 meq/L  


 


Bedside Blood Gas Total CO2   14 mEq/l  


 


Bedside Blood Gas Base Excess


(LAB) 


  


  -14.0 meq/L 


  


 


 


Bedside Blood Gas O2


Saturation 


  


  88.0 % 


  


 


 


Samm Test   Pass  


 


Oxygen Delivery Device   Cannula  


 


Test


  3/23/18


05:32 3/23/18


05:36 3/23/18


10:47 3/23/18


11:50


 


Bedside Glucose 105 mg/dl    112 mg/dl 


 


White Blood Count  22.95 K/uL   


 


Red Blood Count  3.96 M/uL   


 


Hemoglobin  11.2 g/dL   


 


Hematocrit  32.7 %   


 


Mean Corpuscular Volume  82.6 fL   


 


Mean Corpuscular Hemoglobin  28.3 pg   


 


Mean Corpuscular Hemoglobin


Concent 


  34.3 g/dl 


  


  


 


 


RDW Standard Deviation  41.6 fL   


 


RDW Coefficient of Variation  13.7 %   


 


Platelet Count  209 K/uL   


 


Mean Platelet Volume  11.9 fL   


 


Nucleated RBC Absolute Count


(auto) 


  0.08 K/uL 


  


  


 


 


Nucleated Red Blood Cells %  0.4 %   


 


Prothrombin Time  13.0 SECONDS   


 


Prothromb Time International


Ratio 


  1.2 


  


  


 


 


Activated Partial


Thromboplast Time 


  41.7 SECONDS 


  


  


 


 


Partial Thromboplastin Ratio  1.6   


 


Arterial Blood pH  7.31   


 


Arterial Blood Partial


Pressure CO2 


  33 mmHg 


  


  


 


 


Arterial Blood Partial


Pressure O2 


  76 mm/Hg 


  


  


 


 


Arterial Blood HCO3  16 mmol/L   


 


Arterial Blood Oxygen


Saturation 


  93.4 % 


  


  


 


 


Arterial Blood Base Excess  -9.4 mEq/L   


 


Arterial Blood Gas Delivery  5L   


 


Samm Test  POS   


 


Sodium Level  134 mmol/L   


 


Potassium Level  4.5 mmol/L   


 


Chloride Level  106 mmol/L   


 


Carbon Dioxide Level  16 mmol/L   


 


Anion Gap  12.0 mmol/L   


 


Blood Urea Nitrogen  73 mg/dl   


 


Creatinine  2.62 mg/dl   


 


Est Creatinine Clear Calc


Drug Dose 


  19.7 ml/min 


  


  


 


 


Estimated GFR (


American) 


  22.1 


  


  


 


 


Estimated GFR (Non-


American 


  19.1 


  


  


 


 


BUN/Creatinine Ratio  27.7   


 


Random Glucose  101 mg/dl   


 


Calcium Level  8.8 mg/dl   


 


Total Bilirubin  1.0 mg/dl   


 


Aspartate Amino Transf


(AST/SGOT) 


  44 U/L 


  


  


 


 


Alanine Aminotransferase


(ALT/SGPT) 


  43 U/L 


  


  


 


 


Alkaline Phosphatase  96 U/L   


 


Troponin I  0.742 ng/ml   


 


Total Protein  6.8 gm/dl   


 


Albumin  3.3 gm/dl   


 


Globulin  3.5 gm/dl   


 


Albumin/Globulin Ratio  0.9   


 


Procalcitonin  93.42 ng/ml   


 


Lactic Acid Level   1.7 mmol/L  


 


Test


  3/23/18


12:57

## 2018-03-23 NOTE — CARDIOLOGY FOLLOW-UP
Subjective


General


Date of Service:


Mar 23, 2018.


Pt evaluation today including:  conversation w/ patient, physical exam, chart 

review, lab review, review of studies, conversation w/ consultant, review of 

inpatient medication list





History of Present Illness


The patient is a 62 year old female seen in follow up.


Converted to AF with RVR this AM.


Received 20mg IV cardizem with two  3.5 sec pauses.


Remains in AF at 100 - 120 BPM.


Complains of abdominal pain.


Surgical evaluation pending.





Allergies


Coded Allergies:  


     Sulfa Antibiotics (Verified  Allergy, Unknown, swelling, 3/19/18)





Social History


Smoking Status:  Never Smoker


Hx Alcohol Use - Type And Amou:  No





Problem List


Medical Problems:


(1) Anasarca


Status: Acute  





(2) Atrial fibrillation with RVR


Status: Acute  





(3) C. difficile diarrhea


Status: Acute  





(4) Elevated troponin


Status: Acute  





(5) Hyperthyroidism


Status: Acute  





(6) Hypotension


Status: Acute  





(7) Left leg cellulitis


Status: Acute  





(8) Left leg pain


Status: Acute  





(9) Right-sided heart failure


Status: Acute  





(10) Sepsis


Status: Acute  











Review of Systems


Respiratory:  + dyspnea on exertion, No cough, No wheezing, No shortness of 

breath, No dyspnea at rest, No hemoptysis


Cardiac:  + edema, No chest pain, No orthopnea, No PND, No claudication, No 

palpitations





Physical Exam


Vital Signs





Last Vital Signs Documentation








  Date Time  Temp Pulse Resp B/P (MAP) Pulse Ox O2 Delivery O2 Flow Rate FiO2


 


3/23/18 06:00  80 20 184/95 (124) 97 Oxymask 5.0 


 


3/23/18 04:00 36.5       











Physical Exam


Constitutional:  


   General Apperance:  well-nourished


   Level of Distress:  NAD


Lungs:  


   Auscultation:  breath sounds normal, no wheezing, no rales/crackles, no 

rhonchi


Cardiovascular:  


   Heart Auscultation:  RRR, normal S1, normal S2, I/VI WSM


Peripheral Pulses:  


   Radial Pulse:  normal on the right


   Femoral Pulse:  normal on the right


Abdomen:  


   Bowel Sounds:  absent


   Inspection & Palpation:  distended, pertinent finding (diffuse abdominal 

tenderness)


Extremities:  edema (anasarca)


Neurologic:  


   Gait & Station:  pertinent finding (No focal motor deficit)


   Cranial Nerves:  grossly intact





Assessment and Plan


Assessment and Plan


FINAL IMPRESSION:


1. Acute abdominal pain with worsening distention - surgical evaluation and 

repeat CT pending.





2. E. coli sepsis/bacteremia


     -Potential sources include lower extremity cellulitis, SBP in the setting 

of ascites


     -WBC trending upward





3.  Biventricular systolic heart failure (LVEF 35%) with moderate to severe 

right ventricular dilatation and severe dysfunction and indirect evidence of 

right ventricular volume overload on resting 2D transthoracic echo.





4.  Paroxysmal atrial fibrillation with rapid ventricular response.


     - two 3.5 sec pauses after 20mg IV cardizem


     - Rate 100 - 120 BPM on 5mg/hr cardizem infusion


     - recurrent atrial fibrillation driven by inflammatory state, sepsis, 

hyperthyroidism noted with undetectable TSH and elevated T4 in setting of 

structural disease.





5.  Elevated troponin secondary to demand ischemia in the setting of atrial 

fibrillation with rapid ventricular response, severe sepsis, lactic acidosis, 

and CHF.





6.  Change in mental status


    -No evidence of acute ischemia per MRI


    -Etiology not well defined, possibly related to severe sepsis


    -Mental status improved 





7.  Acute renal insufficiency 


      -Related to sepsis, hypoperfusion, and contrast-induced nephropathy


      -Creatinine improving


      -on Bicarbonate infusion


      -Nephrology following - no HD planned for today





8.  Lactic acidosis 


 


PLAN AND RECOMMENDATIONS: 


Surgical consultation pending regarding possible acute abdomen.


Continue low dose cardiazem infusion and IV heparin.


Right heart catheterization canceled today.


Consider endocrinology evaluation of hyperthyroidism.





Addendum:


Called to see patient secondary to 4-6 second post-conversion pauses.  Patient 

remains in AF with intermittent sinus rhythm. Patient reports intermittent 

lightheadedness. No syncope. Cardizem infusion discontinued. Post-conversion 

pauses less frequent and reduced duration (3 sec) after discontinuing IV 

cardiazem.  Although maintaining sinus rhythm is preferable, I will not add 

amiodarone secondary to hyperthyroidism. Consider endocrinology consultation. 

Will add low dose lopressor 2.5mg Q6 hours and continue to monitor. Continue IV 

heparin. 





Francisco Pedraza DO, [facility]





Laboratory Results





Last 24 Hours








Test


  3/22/18


15:25 3/22/18


16:00 3/22/18


16:01 3/22/18


21:43


 


Bedside Glucose 65 mg/dl    96 mg/dl 


 


Urine Random Sodium  16 mEq/L   


 


Blood Gas Sample Site   L Radial  


 


Bedside Blood Gas pH (LAB)   7.27  


 


Bedside Blood Gas pCO2 (LAB)   28 mmHg  


 


Bedside Blood Gas pO2 (LAB)   61 mmHg  


 


Bedside Blood Gas HCO3 (LAB)   13 meq/L  


 


Bedside Blood Gas Total CO2   14 mEq/l  


 


Bedside Blood Gas Base Excess


(LAB) 


  


  -14.0 meq/L 


  


 


 


Bedside Blood Gas O2


Saturation 


  


  88.0 % 


  


 


 


Samm Test   Pass  


 


Oxygen Delivery Device   Cannula  


 


Test


  3/23/18


05:32 3/23/18


05:36 


  


 


 


Bedside Glucose 105 mg/dl    


 


White Blood Count  22.95 K/uL   


 


Red Blood Count  3.96 M/uL   


 


Hemoglobin  11.2 g/dL   


 


Hematocrit  32.7 %   


 


Mean Corpuscular Volume  82.6 fL   


 


Mean Corpuscular Hemoglobin  28.3 pg   


 


Mean Corpuscular Hemoglobin


Concent 


  34.3 g/dl 


  


  


 


 


RDW Standard Deviation  41.6 fL   


 


RDW Coefficient of Variation  13.7 %   


 


Platelet Count  209 K/uL   


 


Mean Platelet Volume  11.9 fL   


 


Nucleated RBC Absolute Count


(auto) 


  0.08 K/uL 


  


  


 


 


Nucleated Red Blood Cells %  0.4 %   


 


Prothrombin Time  13.0 SECONDS   


 


Prothromb Time International


Ratio 


  1.2 


  


  


 


 


Activated Partial


Thromboplast Time 


  41.7 SECONDS 


  


  


 


 


Partial Thromboplastin Ratio  1.6   


 


Arterial Blood pH  7.31   


 


Arterial Blood Partial


Pressure CO2 


  33 mmHg 


  


  


 


 


Arterial Blood Partial


Pressure O2 


  76 mm/Hg 


  


  


 


 


Arterial Blood HCO3  16 mmol/L   


 


Arterial Blood Oxygen


Saturation 


  93.4 % 


  


  


 


 


Arterial Blood Base Excess  -9.4 mEq/L   


 


Arterial Blood Gas Delivery  5L   


 


Samm Test  POS   


 


Sodium Level  134 mmol/L   


 


Potassium Level  4.5 mmol/L   


 


Chloride Level  106 mmol/L   


 


Carbon Dioxide Level  16 mmol/L   


 


Anion Gap  12.0 mmol/L   


 


Blood Urea Nitrogen  73 mg/dl   


 


Creatinine  2.62 mg/dl   


 


Est Creatinine Clear Calc


Drug Dose 


  19.7 ml/min 


  


  


 


 


Estimated GFR (


American) 


  22.1 


  


  


 


 


Estimated GFR (Non-


American 


  19.1 


  


  


 


 


BUN/Creatinine Ratio  27.7   


 


Random Glucose  101 mg/dl   


 


Calcium Level  8.8 mg/dl   


 


Total Bilirubin  1.0 mg/dl   


 


Aspartate Amino Transf


(AST/SGOT) 


  44 U/L 


  


  


 


 


Alanine Aminotransferase


(ALT/SGPT) 


  43 U/L 


  


  


 


 


Alkaline Phosphatase  96 U/L   


 


Troponin I  0.742 ng/ml   


 


Total Protein  6.8 gm/dl   


 


Albumin  3.3 gm/dl   


 


Globulin  3.5 gm/dl   


 


Albumin/Globulin Ratio  0.9   


 


Procalcitonin  93.42 ng/ml

## 2018-03-23 NOTE — NEPHROLOGY PROGRESS NOTE
Nephrology Progress Note


Date of Service:


Mar 23, 2018.


Subjective


61 yo female with shemar, ecoli bacteremia, cdiff, with right sided heart failure 

who urinated more overnight and appears more awake this morning.





Objective











  Date Time  Temp Pulse Resp B/P (MAP) Pulse Ox O2 Delivery O2 Flow Rate FiO2


 


3/23/18 04:00 36.5 77 14 164/79 (107) 95 Oxymask 5.0 


 


3/23/18 04:00      Oxymask 5.0 


 


3/23/18 02:00  80 22 192/109 (136) 95 Oxymask 4.0 


 


3/23/18 00:01 36.4 73 18 174/99 (124) 95 Oxymask 4.0 


 


3/22/18 23:59      Oxymask 4.0 


 


3/22/18 22:00  79 17 115/84 (94) 97 Oxymask 4.0 


 


3/22/18 20:00 36.5 70 19 153/89 (110) 94 Oxymask 4.0 


 


3/22/18 20:00     94 Oxymask 4.0 


 


3/22/18 18:00  71 26 161/95 (117) 95 Nasal Cannula 6.0 


 


3/22/18 16:00 37.1 75 20 186/96 (126) 92 Nasal Cannula 2.0 


 


3/22/18 16:00     95 Nasal Cannula 2.0 


 


3/22/18 14:00  73 20 149/99 (116) 97 Nasal Cannula 2.0 


 


3/22/18 12:00  73 22 134/66 (88) 96 Nasal Cannula 2.0 


 


3/22/18 12:00     95 Room Air  


 


3/22/18 10:00  76 20 141/95 (110) 95 Nasal Cannula 2.0 


 


3/22/18 08:00 37.0 68 17 144/77 (99) 94 Nasal Cannula 2.0 


 


3/22/18 08:00      Room Air  


 


3/22/18 06:00  69 15 135/72 (93) 95 Nasal Cannula 2.0 








Physical Exam:


General-aaox1, restless, more awake


Eyes-no scleral icterus


ENT-mmm


Neck-supple


Lungs-decreased at bases


Heart-regular


Abdomen-distended, hypoactive bowel sounds


Extremities-+1 edema left>right, tender


Neuro- confused





Current Inpatient Medications








 Medications


  (Trade)  Dose


 Ordered  Sig/Wilman


 Route  Start Time


 Stop Time Status Last Admin


Dose Admin


 


 Ioversol


  (Optiray 320)  100 ml  UD  PRN


 IV  3/20/18 01:30


 3/24/18 01:29   


 


 


 Acetaminophen


  (Tylenol Tab)  325 mg  Q6H  PRN


 PO  3/20/18 03:15


 4/19/18 03:14  3/20/18 23:29


325 MG


 


 Prochlorperazine


 Edisylate 5 mg/


 Syringe  5 ml @ 5


 mls/min  Q6H  PRN


 IV  3/20/18 03:15


 4/19/18 03:14   


 


 


 Lorazepam


  (Ativan Inj)  0.5 mg  Q4H  PRN


 IV  3/20/18 03:15


 4/19/18 03:14   


 


 


 Albumin Human


  (Albumin 25%)  12.5 gm  Q6H


 IV  3/20/18 12:00


 3/23/18 05:59  3/22/18 23:54


12.5 GM


 


 Metronidazole 500


 mg/Prmx  100 ml @ 


 100 mls/hr  Q8H


 IV  3/20/18 14:00


 3/30/18 13:59  3/23/18 05:29


100 MLS/HR


 


 Heparin Sodium/


 Dextrose  500 ml @ 


 12 mls/hr  Q24H


 IV  3/21/18 01:45


 4/20/18 01:44   


 


 


 Pantoprazole


 Sodium 40 mg/


 Syringe  10 ml @ 5


 mls/min  DAILY@11


 IV  3/21/18 11:00


 4/20/18 10:59  3/22/18 11:26


5 MLS/MIN


 


 Raspberry


  (Raspberry Syrup


 5ml Cup)  5 ml  QID


 PO  3/21/18 09:30


 4/4/18 09:29  3/22/18 21:49


5 ML


 


 Vancomycin HCl


  (Vancomycin Oral


 Soln)  500 mg  QID


 PO  3/22/18 17:00


 4/5/18 16:59  3/22/18 21:49


500 MG


 


 Ceftriaxone


 Sodium 1 gm/


 Dextrose  50 ml @ 


 100 mls/hr  Q24H


 IV  3/22/18 18:00


 4/1/18 17:59  3/22/18 16:47


100 MLS/HR


 


 Sodium


 Bicarbonate 75


 meq/Dextrose/


 Sodium Chloride  1,075 ml @ 


 75 mls/hr  [id number]


 IV  3/22/18 16:20


 4/21/18 16:19  3/22/18 16:47


75 MLS/HR











Last 24 Hours








Test


  3/22/18


15:25 3/22/18


16:00 3/22/18


16:01 3/22/18


21:43


 


Bedside Glucose 65 mg/dl    96 mg/dl 


 


Urine Random Sodium  16 mEq/L   


 


Blood Gas Sample Site   L Radial  


 


Bedside Blood Gas pH (LAB)   7.27  


 


Bedside Blood Gas pCO2 (LAB)   28 mmHg  


 


Bedside Blood Gas pO2 (LAB)   61 mmHg  


 


Bedside Blood Gas HCO3 (LAB)   13 meq/L  


 


Bedside Blood Gas Total CO2   14 mEq/l  


 


Bedside Blood Gas Base Excess


(LAB) 


  


  -14.0 meq/L 


  


 


 


Bedside Blood Gas O2


Saturation 


  


  88.0 % 


  


 


 


Samm Test   Pass  


 


Oxygen Delivery Device   Cannula  


 


Test


  3/23/18


05:36 


  


  


 


 


White Blood Count 22.95 K/uL    


 


Red Blood Count 3.96 M/uL    


 


Hemoglobin 11.2 g/dL    


 


Hematocrit 32.7 %    


 


Mean Corpuscular Volume 82.6 fL    


 


Mean Corpuscular Hemoglobin 28.3 pg    


 


Mean Corpuscular Hemoglobin


Concent 34.3 g/dl 


  


  


  


 


 


RDW Standard Deviation 41.6 fL    


 


RDW Coefficient of Variation 13.7 %    


 


Platelet Count 209 K/uL    


 


Mean Platelet Volume 11.9 fL    


 


Nucleated RBC Absolute Count


(auto) 0.08 K/uL 


  


  


  


 


 


Nucleated Red Blood Cells % 0.4 %    


 


Arterial Blood pH 7.31    


 


Arterial Blood Partial


Pressure CO2 33 mmHg 


  


  


  


 


 


Arterial Blood Partial


Pressure O2 76 mm/Hg 


  


  


  


 


 


Arterial Blood HCO3 16 mmol/L    


 


Arterial Blood Oxygen


Saturation 93.4 % 


  


  


  


 


 


Arterial Blood Base Excess -9.4 mEq/L    


 


Arterial Blood Gas Delivery 5L    


 


Samm Test POS    














 Date/Time


Source Procedure


Growth Status


 


 


 3/22/18 14:35


Blood Blood Culture


Pending Received


 


 3/22/18 14:28


Blood Blood Culture


Pending Received











Assessment & Plan


shemar-atn-non-oliguric with urination of 350 overnight which is an improvement 

and pt appears more awake today compared to yesterday.  labs for this am are 

pending. still may require dialysis. continue fluids. will discuss further with 

critical care. ck trended down. 





metabolic acidosis-on bicarb drip and will follow bicarb and calcium levels and 

replete calcium accordingly.  calcium likely to drop in the setting of bicarb.

## 2018-03-23 NOTE — SURGERY CONSULTATION
Consultation


Date of Consultation:


Mar 23, 2018.


Attending Physician:


Matisa Lyn M.D.


Reason for Consultation:


C. Diff Colitis


 (Zahra Beth PA-C)





History of Present Illness


History obtained via intensivist, Nurse, and medical chart as patient confused 

and unable to obtain history.  No family at bedside on examination. 





Patient is a 62-year-old female recently resided in [place] who moved to 

the area in January. Presented to the emergency room for lower extremity 

swelling and pain.   The patient was presumed septic and placed on broad-

spectrum antibiotics including a one-time dose of daptomycin, Zosyn, and 

clindamycin.  She underwent chest x-ray which was concerning for pulmonary 

infiltrate recommending CT of the chest.  On CT of the chest, the patient was 

noted to have severe dilation of the RIGHT ventricle. CT of the abdomen pelvis 

demonstrates moderate anasarca, engorgement of the IVC and hepatic veins as 

well as hypoperfusion complex of the abdomen.  Lower extremity ultrasound was 

concerning for cellulitis.  No DVT noted.  The patient was started on cefepime 

as well as Flagyl and doxycycline.  She tested positive for C. difficile and 

started on oral vancomycin.  Originally white count low but now with 

leukocytosis of 22K.  Has been afebrile since admission. Now currently 

hypertensive with RVR and afib.  Abdominal distention present.  Repeat CT scan 

today showing no evidence of pneumoperitoneum and the previously described 

bowel wall thickening not clearly identified however limited study due to no 

contrast. 


 (Zahra Beth PA-C)





Past Medical/Surgical History


Medical Problems:


(1) Anasarca


Status: Acute  





(2) Atrial fibrillation with RVR


Status: Acute  





(3) C. difficile diarrhea


Status: Acute  





(4) Elevated troponin


Status: Acute  





(5) Hyperthyroidism


Status: Acute  





(6) Hypotension


Status: Acute  





(7) Left leg cellulitis


Status: Acute  





(8) Left leg pain


Status: Acute  





(9) Right-sided heart failure


Status: Acute  





(10) Sepsis


Status: Acute  





No significant past medical history





Past Surgical History:


 (Zahra Beth PA-C)





Social History


Smoking Status:  Never Smoker


Smokeless Tobacco Use:  No


Alcohol Use:  none


Drug Use:  none


Marital Status:  single


Housing Status:  lives with family


Occupation Status:  other


 (Zahra Beth PA-C)





Allergies


Coded Allergies:  


     Sulfa Antibiotics (Verified  Allergy, Unknown, swelling, 3/19/18)





Home Medications


No Active Prescriptions or Reported Meds





Current Inpatient Medications





Current Inpatient Medications








 Medications


  (Trade)  Dose


 Ordered  Sig/Wilman


 Route  Start Time


 Stop Time Status Last Admin


Dose Admin


 


 Ioversol


  (Optiray 320)  100 ml  UD  PRN


 IV  3/20/18 01:30


 3/24/18 01:29   


 


 


 Acetaminophen


  (Tylenol Tab)  325 mg  Q6H  PRN


 PO  3/20/18 03:15


 4/19/18 03:14  3/20/18 23:29


325 MG


 


 Prochlorperazine


 Edisylate 5 mg/


 Syringe  5 ml @ 5


 mls/min  Q6H  PRN


 IV  3/20/18 03:15


 4/19/18 03:14   


 


 


 Lorazepam


  (Ativan Inj)  0.5 mg  Q4H  PRN


 IV  3/20/18 03:15


 4/19/18 03:14   


 


 


 Metronidazole 500


 mg/Prmx  100 ml @ 


 100 mls/hr  Q8H


 IV  3/20/18 14:00


 3/30/18 13:59  3/23/18 05:29


100 MLS/HR


 


 Heparin Sodium/


 Dextrose  500 ml @ 


 12 mls/hr  Q24H


 IV  3/21/18 01:45


 4/20/18 01:44   


 


 


 Pantoprazole


 Sodium 40 mg/


 Syringe  10 ml @ 5


 mls/min  DAILY@11


 IV  3/21/18 11:00


 4/20/18 10:59  3/22/18 11:26


5 MLS/MIN


 


 Raspberry


  (Raspberry Syrup


 5ml Cup)  5 ml  QID


 PO  3/21/18 09:30


 4/4/18 09:29  3/23/18 08:00


5 ML


 


 Vancomycin HCl


  (Vancomycin Oral


 Soln)  500 mg  QID


 PO  3/22/18 17:00


 4/5/18 16:59  3/23/18 08:00


500 MG


 


 Ceftriaxone


 Sodium 1 gm/


 Dextrose  50 ml @ 


 100 mls/hr  Q24H


 IV  3/22/18 18:00


 4/1/18 17:59  3/22/18 16:47


100 MLS/HR


 


 Sodium


 Bicarbonate 75


 meq/Dextrose/


 Sodium Chloride  1,075 ml @ 


 75 mls/hr  [id number]


 IV  3/22/18 16:20


 4/21/18 16:19  3/23/18 06:43


75 MLS/HR


 


 Diltiazem HCl 125


 mg/Dextrose  125 ml @ 0


 mls/hr  Q0M PRN


 IV  3/23/18 08:15


 4/22/18 08:14  3/23/18 09:09


5 MLS/HR








 (Zahra Beth ., OFELIA)





Review of Systems


unable to obtain ROS, patient confused


 (Zahra Beth ., OFELIA)





Physical Exam











  Date Time  Temp Pulse Resp B/P (MAP) Pulse Ox O2 Delivery O2 Flow Rate FiO2


 


3/23/18 06:00  80 20 184/95 (124) 97 Oxymask 5.0 


 


3/23/18 04:00 36.5 77 14 164/79 (107) 95 Oxymask 5.0 


 


3/23/18 04:00      Oxymask 5.0 


 


3/23/18 02:00  80 22 192/109 (136) 95 Oxymask 4.0 


 


3/23/18 00:01 36.4 73 18 174/99 (124) 95 Oxymask 4.0 


 


3/22/18 23:59      Oxymask 4.0 


 


3/22/18 22:00  79 17 115/84 (94) 97 Oxymask 4.0 


 


3/22/18 20:00 36.5 70 19 153/89 (110) 94 Oxymask 4.0 


 


3/22/18 20:00     94 Oxymask 4.0 


 


3/22/18 18:00  71 26 161/95 (117) 95 Nasal Cannula 6.0 


 


3/22/18 16:00 37.1 75 20 186/96 (126) 92 Nasal Cannula 2.0 


 


3/22/18 16:00     95 Nasal Cannula 2.0 


 


3/22/18 14:00  73 20 149/99 (116) 97 Nasal Cannula 2.0 


 


3/22/18 12:00  73 22 134/66 (88) 96 Nasal Cannula 2.0 


 


3/22/18 12:00     95 Room Air  


 


3/22/18 10:00  76 20 141/95 (110) 95 Nasal Cannula 2.0 








General Appearance:  + moderate distress, + thin


Head:  normocephalic, atraumatic


ENT:  hearing grossly normal


Neck:  trachea midline


Respiratory/Chest:  no respiratory distress, no accessory muscle use, + 

decreased breath sounds (bilateral lower lobes)


Cardiovascular:  no murmur, + tachycardia


Abdomen/GI:  no organomegaly, no pulsatile mass, + abnormal bowel sounds (

hypoactive bowel sounds), + distended, + guarding (involuntary guarding on 

palpation, unable to assess for tenderness but patient moaning with pain 

throughout entire examination.  Bedside aid states she started this since 

coming back from CT scan.)


Neurologic/Psych:  + disoriented


Skin:  warm/dry, no rash


 (Zahra Beth ., OFELIA)





Laboratory Results





Last 24 Hours








Test


  3/22/18


15:25 3/22/18


16:00 3/22/18


16:01 3/22/18


21:43


 


Bedside Glucose 65 mg/dl    96 mg/dl 


 


Urine Random Sodium  16 mEq/L   


 


Blood Gas Sample Site   L Radial  


 


Bedside Blood Gas pH (LAB)   7.27  


 


Bedside Blood Gas pCO2 (LAB)   28 mmHg  


 


Bedside Blood Gas pO2 (LAB)   61 mmHg  


 


Bedside Blood Gas HCO3 (LAB)   13 meq/L  


 


Bedside Blood Gas Total CO2   14 mEq/l  


 


Bedside Blood Gas Base Excess


(LAB) 


  


  -14.0 meq/L 


  


 


 


Bedside Blood Gas O2


Saturation 


  


  88.0 % 


  


 


 


Samm Test   Pass  


 


Oxygen Delivery Device   Cannula  


 


Test


  3/23/18


05:32 3/23/18


05:36 


  


 


 


Bedside Glucose 105 mg/dl    


 


White Blood Count  22.95 K/uL   


 


Red Blood Count  3.96 M/uL   


 


Hemoglobin  11.2 g/dL   


 


Hematocrit  32.7 %   


 


Mean Corpuscular Volume  82.6 fL   


 


Mean Corpuscular Hemoglobin  28.3 pg   


 


Mean Corpuscular Hemoglobin


Concent 


  34.3 g/dl 


  


  


 


 


RDW Standard Deviation  41.6 fL   


 


RDW Coefficient of Variation  13.7 %   


 


Platelet Count  209 K/uL   


 


Mean Platelet Volume  11.9 fL   


 


Nucleated RBC Absolute Count


(auto) 


  0.08 K/uL 


  


  


 


 


Nucleated Red Blood Cells %  0.4 %   


 


Prothrombin Time  13.0 SECONDS   


 


Prothromb Time International


Ratio 


  1.2 


  


  


 


 


Activated Partial


Thromboplast Time 


  41.7 SECONDS 


  


  


 


 


Partial Thromboplastin Ratio  1.6   


 


Arterial Blood pH  7.31   


 


Arterial Blood Partial


Pressure CO2 


  33 mmHg 


  


  


 


 


Arterial Blood Partial


Pressure O2 


  76 mm/Hg 


  


  


 


 


Arterial Blood HCO3  16 mmol/L   


 


Arterial Blood Oxygen


Saturation 


  93.4 % 


  


  


 


 


Arterial Blood Base Excess  -9.4 mEq/L   


 


Arterial Blood Gas Delivery  5L   


 


Samm Test  POS   


 


Sodium Level  134 mmol/L   


 


Potassium Level  4.5 mmol/L   


 


Chloride Level  106 mmol/L   


 


Carbon Dioxide Level  16 mmol/L   


 


Anion Gap  12.0 mmol/L   


 


Blood Urea Nitrogen  73 mg/dl   


 


Creatinine  2.62 mg/dl   


 


Est Creatinine Clear Calc


Drug Dose 


  19.7 ml/min 


  


  


 


 


Estimated GFR (


American) 


  22.1 


  


  


 


 


Estimated GFR (Non-


American 


  19.1 


  


  


 


 


BUN/Creatinine Ratio  27.7   


 


Random Glucose  101 mg/dl   


 


Calcium Level  8.8 mg/dl   


 


Total Bilirubin  1.0 mg/dl   


 


Aspartate Amino Transf


(AST/SGOT) 


  44 U/L 


  


  


 


 


Alanine Aminotransferase


(ALT/SGPT) 


  43 U/L 


  


  


 


 


Alkaline Phosphatase  96 U/L   


 


Troponin I  0.742 ng/ml   


 


Total Protein  6.8 gm/dl   


 


Albumin  3.3 gm/dl   


 


Globulin  3.5 gm/dl   


 


Albumin/Globulin Ratio  0.9   


 


Procalcitonin  93.42 ng/ml   








ABD/PELVIS IV CONTRAST ONLY 3/20/2018





CLINICAL HISTORY: 62 years-old Female presenting with abd pain. 





TECHNIQUE: Multidetector CT of the abdomen and pelvis was performed after the


administration of intravenous contrast. IV contrast: 119 mL of Optiray 320. A


dose lowering technique was used consistent with the principles of ALARA (as low


as reasonably achievable). 





COMPARISON: None.





CT DOSE (mGy.cm): The estimated cumulative dose is 470.21 mGy.cm.





FINDINGS:





 topogram: Cardiomegaly.





Lung bases: Lungs and pleural spaces clear. Enlarged right atrium and right


ventricle. No pericardial or pleural effusion. 





Liver: Normal morphology. Extensive periportal edema. No focal lesion. The liver


is borderline enlarged. Patent hepatic vasculature. Engorgement of the


intrahepatic IVC and hepatic veins.





Biliary: No gross evidence of biliary ductal dilatation of the presence of


severe periportal edema makes evaluation difficult. Normal gallbladder.





Pancreas: Mild parenchymal atrophy. Mild prominence of the pancreatic duct.


Suggestion of pancreas divisum. No gross evidence of a pancreatic head mass.





Spleen: Normal.





Adrenal glands: Intensely hyperemic adrenal glands.





Kidneys and ureters: Hypodensity in the left kidney likely simple cyst. No


hydronephrosis. Ureters poorly visualized secondary to diffuse edema.





Bladder: Normal.





Pelvic organs: Globular uterine fundus suggest the presence of fibroids. Ovaries


somewhat prominent for age.





Bowel: Mild diffuse wall thickening of the colon. Mucosal hyperenhancement of


the stomach with mildly diffuse thickened gastric wall. The duodenum also


demonstrates mucosal hyperenhancement in the slightly thick wall as do few


additional loops of small bowel more distally. No bowel obstruction.





Peritoneal cavity: Small amount of abdominal pelvic ascites, which is simple


appearing. No intraperitoneal free gas. Diffuse mesenteric edema.





Lymph nodes: Several enlarged bilateral external iliac lymph nodes, the largest


on the left measuring 9 mm in the short axis (series 6 image 328). Enlarged


bilateral common iliac lymph nodes, again the largest on the left measuring 10


mm in the short axis (series 6 image 247). Borderline enlarged pericaval and


periaortic lymph nodes.





Vasculature: Aorta and IVC patent and normal in caliber.





Abdominal wall: Anasarca.





Musculoskeletal: Normal.





IMPRESSION:


1.  Bilateral adrenal hyperenhancement can be seen in the setting of the CT


hypoperfusion complex (shock bowel). CT hypoperfusion complex further evidenced


by abdominal pelvic ascites, mesenteric edema, periportal edema, ascites, and


evidence of right heart failure. Mild bowel wall thickening with mucosal


hyperemia would also be compatible with hypoperfusion.





2.  Enlarged retroperitoneal and pelvic lymph nodes, which are of uncertain


etiology. Correlate clinically and consider follow-up to resolution. These may


be reactive or related to congestive change and less there is a history of


underlying malignancy or lymphoproliferative disease.








CT ABD AND PELVIS WITHOUT CONTRAST 03/23/2018


IMPRESSION: 


1. Limited study without the use of IV contrast. The previously described bowel


wall thickening is not well seen on today's study.


2.  Fluid overload manifested by small to moderate bilateral pleural effusions,


moderate diffuse body wall edema, and abdominal pelvic ascites.


3. Cortical medullary enhancement of the bilateral kidneys is compatible with


retained contrast from prior contrast enhanced study. These findings are most


compatible with decreased renal function and/or acute kidney injury. Vicarious


excretion of contrast is also noted within the gallbladder.


4. Indeterminate hyperattenuating 8 mm lesion of the super pole left kidney.


5. Bibasilar dependent consolidation favors compressive atelectasis.


6. Cardiomegaly.


 (Zahra Beth ., PA-C)





Assessment & Plan


62 year-old female with no significant past medical history who presented to 

emergency department with complaint of worsening lower extremity swelling and 

pain.  Presumed sepsis from possible lower extremity cellulitis.  Found to have 

right sided heart failure with evidence of fluid overload with anasarca and CT 

scan of the abdomen and pelvis showing evidence of hypoperfusion and mild 

diffuse wall thickening of the colon and few loops of small bowel and stomach.  

Tested positive for C. diff.  


   - White count originally low now elevated at 22K today. 


   - Troponin elevated at 2.980 on 3/20 today 0.742


   - Lactic acid elevated at 3.7 on admission, normalized at 1.5 on 3/22


   - Todays Vitals show HR in the 80's with hypertension , has now gone into 

afib with RVR with HR in the 140's on exam ,now on Cardizem IV infusion


   - afebrile


   - Repeat CT scan showing no evidence of peritoneum or bowel perforation. 

limited study without IV contrast.  





Plan:


Stat lactic acid


No evidence of colonic perforation or pneumoperitoneum on repeat CT scan 

however there is abdominal distention and increase in leukocytosis. Raises 

concern for need of total colectomy with end ileostomy if fails medical 

treatment.  Given patent's newly diagnosed right heart failure, afib with RVR, 

acute kidney injury she is at increased risk of any surgical intervention. 


Will await results of lactic acid. 


Continue close monitoring in ICU


Continue current antibiotics


Continue current ICU management


Discussed with Dr. Ricardo who agrees with above.








Addendum: 03/23/2018 11:58 am


Lactic acid at 1.7.


Will continue close monitoring and medical management


Dr. Ricardo to evaluate patient this afternoon


 (Zahra Beth .OFELIA)


Patient seen and examined with Zahra Beth PA-C.  Agree with above will 

follow exams and monitor response to antibiotics and supportive care.  If fails 

will need exploration with total abdominal colectomy and diverting ileostomy, 

likely permanent.  Poor surgical candidate but if sepsis worsens will be forced 

to operate.  Seems to be slowly improving with improved CT scan and stable 

lactate level.


 (Domingo Ricardo M.D.)

## 2018-03-24 VITALS — HEART RATE: 83 BPM | OXYGEN SATURATION: 97 % | DIASTOLIC BLOOD PRESSURE: 99 MMHG | SYSTOLIC BLOOD PRESSURE: 174 MMHG

## 2018-03-24 VITALS
OXYGEN SATURATION: 95 % | TEMPERATURE: 98.24 F | DIASTOLIC BLOOD PRESSURE: 76 MMHG | HEART RATE: 95 BPM | SYSTOLIC BLOOD PRESSURE: 169 MMHG

## 2018-03-24 VITALS — SYSTOLIC BLOOD PRESSURE: 163 MMHG | OXYGEN SATURATION: 95 % | HEART RATE: 97 BPM | DIASTOLIC BLOOD PRESSURE: 75 MMHG

## 2018-03-24 VITALS
TEMPERATURE: 98.42 F | OXYGEN SATURATION: 95 % | HEART RATE: 88 BPM | DIASTOLIC BLOOD PRESSURE: 79 MMHG | SYSTOLIC BLOOD PRESSURE: 155 MMHG

## 2018-03-24 VITALS — DIASTOLIC BLOOD PRESSURE: 80 MMHG | OXYGEN SATURATION: 95 % | HEART RATE: 100 BPM | SYSTOLIC BLOOD PRESSURE: 167 MMHG

## 2018-03-24 VITALS — SYSTOLIC BLOOD PRESSURE: 167 MMHG | OXYGEN SATURATION: 94 % | DIASTOLIC BLOOD PRESSURE: 80 MMHG | HEART RATE: 101 BPM

## 2018-03-24 VITALS
DIASTOLIC BLOOD PRESSURE: 84 MMHG | TEMPERATURE: 98.06 F | HEART RATE: 80 BPM | OXYGEN SATURATION: 96 % | SYSTOLIC BLOOD PRESSURE: 163 MMHG

## 2018-03-24 VITALS — DIASTOLIC BLOOD PRESSURE: 83 MMHG | OXYGEN SATURATION: 91 % | SYSTOLIC BLOOD PRESSURE: 174 MMHG | HEART RATE: 87 BPM

## 2018-03-24 VITALS — DIASTOLIC BLOOD PRESSURE: 87 MMHG | HEART RATE: 84 BPM | OXYGEN SATURATION: 96 % | SYSTOLIC BLOOD PRESSURE: 163 MMHG

## 2018-03-24 VITALS — OXYGEN SATURATION: 96 % | HEART RATE: 98 BPM | SYSTOLIC BLOOD PRESSURE: 171 MMHG | DIASTOLIC BLOOD PRESSURE: 83 MMHG

## 2018-03-24 VITALS — SYSTOLIC BLOOD PRESSURE: 173 MMHG | HEART RATE: 107 BPM | OXYGEN SATURATION: 94 % | DIASTOLIC BLOOD PRESSURE: 94 MMHG

## 2018-03-24 VITALS — TEMPERATURE: 97.52 F

## 2018-03-24 VITALS — OXYGEN SATURATION: 96 % | HEART RATE: 82 BPM | SYSTOLIC BLOOD PRESSURE: 157 MMHG | DIASTOLIC BLOOD PRESSURE: 82 MMHG

## 2018-03-24 VITALS — SYSTOLIC BLOOD PRESSURE: 156 MMHG | OXYGEN SATURATION: 96 % | DIASTOLIC BLOOD PRESSURE: 80 MMHG | HEART RATE: 85 BPM

## 2018-03-24 VITALS — HEART RATE: 97 BPM | OXYGEN SATURATION: 97 % | DIASTOLIC BLOOD PRESSURE: 89 MMHG | SYSTOLIC BLOOD PRESSURE: 175 MMHG

## 2018-03-24 VITALS — SYSTOLIC BLOOD PRESSURE: 162 MMHG | HEART RATE: 93 BPM | DIASTOLIC BLOOD PRESSURE: 74 MMHG | OXYGEN SATURATION: 96 %

## 2018-03-24 VITALS — OXYGEN SATURATION: 95 % | DIASTOLIC BLOOD PRESSURE: 74 MMHG | HEART RATE: 79 BPM | SYSTOLIC BLOOD PRESSURE: 158 MMHG

## 2018-03-24 VITALS — OXYGEN SATURATION: 91 % | HEART RATE: 91 BPM | SYSTOLIC BLOOD PRESSURE: 183 MMHG | DIASTOLIC BLOOD PRESSURE: 92 MMHG

## 2018-03-24 VITALS — OXYGEN SATURATION: 95 % | SYSTOLIC BLOOD PRESSURE: 178 MMHG | HEART RATE: 103 BPM | DIASTOLIC BLOOD PRESSURE: 73 MMHG

## 2018-03-24 VITALS — SYSTOLIC BLOOD PRESSURE: 180 MMHG | OXYGEN SATURATION: 98 % | HEART RATE: 85 BPM | DIASTOLIC BLOOD PRESSURE: 97 MMHG

## 2018-03-24 VITALS
HEART RATE: 83 BPM | OXYGEN SATURATION: 95 % | TEMPERATURE: 97.88 F | DIASTOLIC BLOOD PRESSURE: 79 MMHG | SYSTOLIC BLOOD PRESSURE: 164 MMHG

## 2018-03-24 VITALS — HEART RATE: 96 BPM | DIASTOLIC BLOOD PRESSURE: 81 MMHG | OXYGEN SATURATION: 95 % | SYSTOLIC BLOOD PRESSURE: 164 MMHG

## 2018-03-24 VITALS
DIASTOLIC BLOOD PRESSURE: 78 MMHG | HEART RATE: 100 BPM | TEMPERATURE: 98.42 F | OXYGEN SATURATION: 95 % | SYSTOLIC BLOOD PRESSURE: 164 MMHG

## 2018-03-24 VITALS — SYSTOLIC BLOOD PRESSURE: 153 MMHG | HEART RATE: 84 BPM | DIASTOLIC BLOOD PRESSURE: 78 MMHG | OXYGEN SATURATION: 96 %

## 2018-03-24 VITALS — DIASTOLIC BLOOD PRESSURE: 85 MMHG | SYSTOLIC BLOOD PRESSURE: 166 MMHG | OXYGEN SATURATION: 96 % | HEART RATE: 87 BPM

## 2018-03-24 VITALS — HEART RATE: 92 BPM | OXYGEN SATURATION: 91 % | DIASTOLIC BLOOD PRESSURE: 79 MMHG | SYSTOLIC BLOOD PRESSURE: 190 MMHG

## 2018-03-24 VITALS — HEART RATE: 90 BPM | TEMPERATURE: 98.42 F | OXYGEN SATURATION: 96 %

## 2018-03-24 VITALS — HEART RATE: 96 BPM | OXYGEN SATURATION: 91 % | DIASTOLIC BLOOD PRESSURE: 83 MMHG | SYSTOLIC BLOOD PRESSURE: 161 MMHG

## 2018-03-24 VITALS — HEART RATE: 93 BPM | SYSTOLIC BLOOD PRESSURE: 188 MMHG | DIASTOLIC BLOOD PRESSURE: 96 MMHG | OXYGEN SATURATION: 98 %

## 2018-03-24 VITALS — OXYGEN SATURATION: 95 % | DIASTOLIC BLOOD PRESSURE: 108 MMHG | HEART RATE: 83 BPM | SYSTOLIC BLOOD PRESSURE: 183 MMHG

## 2018-03-24 VITALS — OXYGEN SATURATION: 96 % | SYSTOLIC BLOOD PRESSURE: 150 MMHG | DIASTOLIC BLOOD PRESSURE: 78 MMHG | HEART RATE: 80 BPM

## 2018-03-24 VITALS — HEART RATE: 85 BPM | DIASTOLIC BLOOD PRESSURE: 82 MMHG | OXYGEN SATURATION: 96 % | SYSTOLIC BLOOD PRESSURE: 162 MMHG

## 2018-03-24 VITALS — DIASTOLIC BLOOD PRESSURE: 79 MMHG | SYSTOLIC BLOOD PRESSURE: 166 MMHG | OXYGEN SATURATION: 92 % | HEART RATE: 90 BPM

## 2018-03-24 VITALS — HEART RATE: 97 BPM | OXYGEN SATURATION: 97 % | DIASTOLIC BLOOD PRESSURE: 94 MMHG | SYSTOLIC BLOOD PRESSURE: 171 MMHG

## 2018-03-24 VITALS — HEART RATE: 95 BPM | OXYGEN SATURATION: 95 %

## 2018-03-24 VITALS — OXYGEN SATURATION: 96 % | HEART RATE: 93 BPM

## 2018-03-24 VITALS — OXYGEN SATURATION: 96 %

## 2018-03-24 VITALS — OXYGEN SATURATION: 95 % | HEART RATE: 98 BPM

## 2018-03-24 VITALS — OXYGEN SATURATION: 97 % | HEART RATE: 83 BPM | SYSTOLIC BLOOD PRESSURE: 183 MMHG | DIASTOLIC BLOOD PRESSURE: 97 MMHG

## 2018-03-24 VITALS — OXYGEN SATURATION: 95 % | HEART RATE: 101 BPM

## 2018-03-24 VITALS — HEART RATE: 92 BPM | OXYGEN SATURATION: 96 % | SYSTOLIC BLOOD PRESSURE: 178 MMHG | DIASTOLIC BLOOD PRESSURE: 97 MMHG

## 2018-03-24 LAB
BASOPHILS # BLD: 0.02 K/UL (ref 0–0.2)
BASOPHILS NFR BLD: 0.1 %
BUN SERPL-MCNC: 34 MG/DL (ref 7–18)
BUN SERPL-MCNC: 46 MG/DL (ref 7–18)
CALCIUM SERPL-MCNC: 8.1 MG/DL (ref 8.5–10.1)
CALCIUM SERPL-MCNC: 8.7 MG/DL (ref 8.5–10.1)
CO2 SERPL-SCNC: 25 MMOL/L (ref 21–32)
CO2 SERPL-SCNC: 29 MMOL/L (ref 21–32)
CREAT SERPL-MCNC: 0.86 MG/DL (ref 0.6–1.2)
CREAT SERPL-MCNC: 1.13 MG/DL (ref 0.6–1.2)
EOS ABS #: 0.02 K/UL (ref 0–0.5)
EOSINOPHIL NFR BLD AUTO: 252 K/UL (ref 130–400)
GLUCOSE SERPL-MCNC: 139 MG/DL (ref 70–99)
GLUCOSE SERPL-MCNC: 151 MG/DL (ref 70–99)
HCT VFR BLD CALC: 35 % (ref 37–47)
HGB BLD-MCNC: 11.9 G/DL (ref 12–16)
IG#: 0.3 K/UL (ref 0–0.02)
IMM GRANULOCYTES NFR BLD AUTO: 10.9 %
LYMPHOCYTES # BLD: 2.14 K/UL (ref 1.2–3.4)
MCH RBC QN AUTO: 27 PG (ref 25–34)
MCHC RBC AUTO-ENTMCNC: 34 G/DL (ref 32–36)
MCV RBC AUTO: 79.5 FL (ref 80–100)
MONO ABS #: 2.56 K/UL (ref 0.11–0.59)
MONOCYTES NFR BLD: 13.1 %
NEUT ABS #: 14.55 K/UL (ref 1.4–6.5)
NEUTROPHILS # BLD AUTO: 0.1 %
NEUTROPHILS NFR BLD AUTO: 74.3 %
PHOSPHATE SERPL-MCNC: 2.7 MG/DL (ref 2.5–4.9)
PHOSPHATE SERPL-MCNC: 2.8 MG/DL (ref 2.5–4.9)
PMV BLD AUTO: 11.3 FL (ref 7.4–10.4)
POTASSIUM SERPL-SCNC: 3.3 MMOL/L (ref 3.5–5.1)
POTASSIUM SERPL-SCNC: 3.4 MMOL/L (ref 3.5–5.1)
PTT PATIENT: 48.7 SECONDS (ref 21–31)
PTT PATIENT: 52 SECONDS (ref 21–31)
RED CELL DISTRIBUTION WIDTH CV: 13.2 % (ref 11.5–14.5)
RED CELL DISTRIBUTION WIDTH SD: 38.2 FL (ref 36.4–46.3)
SODIUM SERPL-SCNC: 142 MMOL/L (ref 136–145)
SODIUM SERPL-SCNC: 144 MMOL/L (ref 136–145)
WBC # BLD AUTO: 19.59 K/UL (ref 4.8–10.8)

## 2018-03-24 RX ADMIN — HYDROMORPHONE HYDROCHLORIDE PRN MG: 1 INJECTION, SOLUTION INTRAMUSCULAR; INTRAVENOUS; SUBCUTANEOUS at 08:34

## 2018-03-24 RX ADMIN — HYDROMORPHONE HYDROCHLORIDE PRN MG: 1 INJECTION, SOLUTION INTRAMUSCULAR; INTRAVENOUS; SUBCUTANEOUS at 03:37

## 2018-03-24 RX ADMIN — METOPROLOL TARTRATE SCH MG: 5 INJECTION, SOLUTION INTRAVENOUS at 05:42

## 2018-03-24 RX ADMIN — PANTOPRAZOLE SODIUM SCH MLS/MIN: 40 INJECTION, POWDER, FOR SOLUTION INTRAVENOUS at 10:00

## 2018-03-24 RX ADMIN — VANCOMYCIN HYDROCHLORIDE SCH MG: 1 INJECTION, POWDER, LYOPHILIZED, FOR SOLUTION INTRAVENOUS at 16:44

## 2018-03-24 RX ADMIN — METOPROLOL TARTRATE SCH MG: 5 INJECTION, SOLUTION INTRAVENOUS at 12:14

## 2018-03-24 RX ADMIN — VANCOMYCIN HYDROCHLORIDE SCH MG: 1 INJECTION, POWDER, LYOPHILIZED, FOR SOLUTION INTRAVENOUS at 08:33

## 2018-03-24 RX ADMIN — Medication SCH ML: at 08:33

## 2018-03-24 RX ADMIN — VANCOMYCIN HYDROCHLORIDE SCH MG: 1 INJECTION, POWDER, LYOPHILIZED, FOR SOLUTION INTRAVENOUS at 12:16

## 2018-03-24 RX ADMIN — Medication SCH ML: at 21:13

## 2018-03-24 RX ADMIN — CEFTRIAXONE SODIUM SCH MLS/HR: 1 INJECTION, POWDER, FOR SOLUTION INTRAVENOUS at 17:47

## 2018-03-24 RX ADMIN — METRONIDAZOLE SCH MLS/HR: 500 INJECTION, SOLUTION INTRAVENOUS at 05:42

## 2018-03-24 RX ADMIN — NYSTATIN SCH ML: 100000 SUSPENSION ORAL at 21:13

## 2018-03-24 RX ADMIN — Medication SCH ML: at 12:26

## 2018-03-24 RX ADMIN — DEXTROSE AND SODIUM CHLORIDE SCH MLS/HR: 5; .45 INJECTION, SOLUTION INTRAVENOUS at 18:15

## 2018-03-24 RX ADMIN — SODIUM BICARBONATE SCH MLS/HR: 84 INJECTION, SOLUTION INTRAVENOUS at 10:41

## 2018-03-24 RX ADMIN — VANCOMYCIN HYDROCHLORIDE SCH MG: 1 INJECTION, POWDER, LYOPHILIZED, FOR SOLUTION INTRAVENOUS at 21:13

## 2018-03-24 RX ADMIN — Medication SCH ML: at 16:45

## 2018-03-24 RX ADMIN — METRONIDAZOLE SCH MLS/HR: 500 INJECTION, SOLUTION INTRAVENOUS at 14:21

## 2018-03-24 RX ADMIN — Medication SCH ML: at 12:14

## 2018-03-24 RX ADMIN — METRONIDAZOLE SCH MLS/HR: 500 INJECTION, SOLUTION INTRAVENOUS at 21:13

## 2018-03-24 RX ADMIN — METOPROLOL TARTRATE SCH MG: 50 TABLET, FILM COATED ORAL at 21:13

## 2018-03-24 NOTE — CRITICAL CARE PROGRESS NOTE
Critical Care Progress Note


Date of Service


Mar 24, 2018.





Attending


Dr. Sommers





Subjective


The patient is marginally improving however her mental status is still 

borderline.  The patient was able to answer questions.  Very lethargic and 

fatigued.  Part of it being without nutrition for the past 3 days.  In addition 

to her overall picture with sepsis as well as right ventricular failure.  No 

events occurred overnight.  Her urine output however improved as well as her 

kidney function.





Objective


Her physical exam on 3/22/2018 revealed no fever, O2 saturation is 95%, blood 

pressure elevated to 186/96, probably due to agony.  Respiratory rate is 18.  

Positive JVP, S1-S2 regular rate and rhythm.  Distant breath sounds 

bilaterally.  Abdomen is distended and tender diffusely.  Left leg is more 

edematous than the right leg.  No pain.  Except below the knee area.  No calves 

pain.  Neurologically she is nonfocal but continued to be confused.





Her physical exam on 3/23/2018 revealed, no fever but her O2 saturation is 

variable between 88-93% on nasal cannula currently on oxygen mask.  The patient 

heart examination S1-S2 irregularly irregular with tachycardia accompanied with 

areas of pauses.  Bilateral lower ex extremity edema, the lungs actually with 

bilateral diminished breath sounds and minimal crackles.  Abdomen is still 

distended and tender mainly in the left lower quadrant.





Her labs were reviewed as well as a CAT scan of the abdomen which showed bowel 

wall thickening mainly in the ascending colon, ascites was noted and anasarca.  

Bilateral pleural effusion with compressive atelectasis also noted.





Physical exam on 3/24/2018 revealed lethargic patient, vital signs are stable, 

no recurrence of abnormal rhythm, heart rate in the range of 83, but the blood 

pressure remains in the range of 153, S1-S2 regular rate and rhythm, bilateral 

crackles mainly at the bases, abdomen is soft nontender except in the pelvic 

area, swelling in her left lower extremity has resolved, the patient overall is 

just lethargic but nonfocal.





Laboratory and imaging also were reviewed and her potassium was repleted.  I 

have reviewed all the data myself with Dr. Byrd who is her daughter.





Assessment & Plan


1.  Sepsis, improving, due to E. coli bacteremia with pansensitive to 

antibiotics as well as C. difficile colitis.


2.  Right ventricular failure.  Was severe pulmonary hypertension, etiology is 

unknown.


3.  C. difficile colitis, intra-abdominal pressure is improving and now down to 

11.


4.  Left leg edema with left sided retroperitoneal lymphadenopathy of unknown 

etiology, to be addressed in the future.


5.  Persistent hypertension.


6.  Cardiomyopathy with ejection fraction of 35%, fluid overload that responded 

to Lasix.


7.  Likely the patient has sick euthyroid syndrome.  This does not require any 

treatment.


8.  Difficulty swallowing due to lethargy, her mental status was altered and 

remains altered with marginal improvement on a daily basis.


9.  Acute kidney insufficiency, likely related to contrast-induced  nephropathy.





Plan:





1.  I will continue with Lopressor, adjust the dose and start the patient on 

Lopressor via the NG tube in addition to as needed IV.  In 24 hours I will 

recalculated the NG tube dose.  And I will escalate the dose slowly to avoid 

sinus pause which occurred with calcium channel blocker earlier.  She is no 

longer on diltiazem.


2.  Continue with hydralazine for blood pressure control to keep systolic blood 

pressure less than 140.


3.  If the above is inadequate, we will start the patient on Nitropaste.


4.  Appreciate Dr. Nelson input from cardiology.


5.  Swallow eval at the bedside was unsuccessful in this patient, will continue 

treatment and tube feeding through the NG tube.


6.  Her BUN/creatinine has been improving significantly.  I will continue the 

diuresis at the moment.


7.  Leukocytosis is slowly improving as well.


8.  Continue with vancomycin via the NG tube.


9.  Continue with ceftriaxone..


10.  Tube feeding.


11.  Nutrition consult, appreciated.


12.  CVP was in the range of 4 and the patient received IV fluids, however 

given the fact that she is improving, and we are managing with diuresis, I will 

stop the IV fluids at this point.  I will substituted it for tube feeding.


13.  Physical therapy for ambulation and walking out of bed.


14.  Continue to follow the intra-abdominal pressure.


15.  Appreciate all consults involved.


16.  I will repeat the echo on Monday to evaluate for pulmonary hypertension 

after aggressive diuresis.


17.  Replete electrolytes.





Case discussed with the staff on rounds.  With Dr. Byrd who is her daughter.  

All their questions been answered.  Critical care time spent with the patient 

was 45 minutes.





Data


Medications:





Current Inpatient Medications








 Medications


  (Trade)  Dose


 Ordered  Sig/Wilman


 Route  Start Time


 Stop Time Status Last Admin


Dose Admin


 


 Acetaminophen


  (Tylenol Tab)  325 mg  Q6H  PRN


 PO  3/20/18 03:15


 4/19/18 03:14  3/20/18 23:29


325 MG


 


 Prochlorperazine


 Edisylate 5 mg/


 Syringe  5 ml @ 5


 mls/min  Q6H  PRN


 IV  3/20/18 03:15


 4/19/18 03:14   


 


 


 Metronidazole 500


 mg/Prmx  100 ml @ 


 100 mls/hr  Q8H


 IV  3/20/18 14:00


 3/30/18 13:59  3/24/18 05:42


100 MLS/HR


 


 Pantoprazole


 Sodium 40 mg/


 Syringe  10 ml @ 5


 mls/min  DAILY@11


 IV  3/21/18 11:00


 4/20/18 10:59  3/24/18 10:00


5 MLS/MIN


 


 Raspberry


  (Raspberry Syrup


 5ml Cup)  5 ml  QID


 PO  3/21/18 09:30


 4/4/18 09:29  3/24/18 12:14


5 ML


 


 Vancomycin HCl


  (Vancomycin Oral


 Soln)  500 mg  QID


 PO  3/22/18 17:00


 4/5/18 16:59  3/24/18 12:16


500 MG


 


 Sodium


 Bicarbonate 75


 meq/Dextrose/


 Sodium Chloride  1,075 ml @ 


 75 mls/hr  [id number]


 IV  3/22/18 16:20


 4/21/18 16:19  3/24/18 10:41


75 MLS/HR


 


 Diltiazem HCl 125


 mg/Dextrose  125 ml @ 0


 mls/hr  Q0M PRN


 IV  3/23/18 08:15


 4/22/18 08:14  3/23/18 09:09


5 MLS/HR


 


 Hydromorphone HCl


  (Dilaudid Inj)  0.5 mg  Q3H  PRN


 IV  3/23/18 10:45


 4/6/18 10:44  3/24/18 08:34


0.5 MG


 


 Ceftriaxone


 Sodium 2 gm/


 Dextrose  50 ml @ 


 100 mls/hr  Q24H


 IV  3/23/18 18:00


 4/1/18 17:59  3/23/18 18:13


100 MLS/HR


 


 Heparin Sodium/


 Dextrose  500 ml @ 


 13 mls/hr  Q24H


 IV  3/23/18 11:30


 4/22/18 11:29  3/23/18 19:43


13 MLS/HR


 


 Metoprolol


 Tartrate


  (Lopressor Iv)  2.5 mg  Q6


 IV.  3/23/18 18:00


 4/22/18 17:59  3/24/18 12:14


2.5 MG


 


 Hydralazine HCl


  (HydrALAZINE INJ)  10 mg  Q4  PRN


 IV.  3/23/18 17:15


 4/22/18 17:14  3/24/18 08:33


10 MG


 


 Metoprolol


 Tartrate


  (Lopressor Tab)  25 mg  BID


 PO  3/24/18 09:15


 4/23/18 09:14  3/24/18 09:59


25 MG


 


 Potassium


 Phosphate 30 mmol/


 Sodium Chloride  510 ml @ 


 102 mls/hr  0945  ONCE


 IV  3/24/18 09:45


 3/24/18 14:44  3/24/18 09:59


102 MLS/HR


 


 Enteral


 Nutritional


 Formula


  (Peptamen 1.5)  1,000 ml  UD


 NG  3/24/18 11:45


 4/23/18 11:44  3/24/18 12:26


1,000 ML


 


 Enteral


 Nutritional


 Formula


  (Prosource No


 Carb)  30 ml  DAILY@0900


 NG  3/24/18 12:30


 4/23/18 12:29  3/24/18 12:26


30 ML








Vital Signs:











  Date Time  Temp Pulse Resp B/P (MAP) Pulse Ox O2 Delivery O2 Flow Rate FiO2


 


3/24/18 12:14  81  163/84    


 


3/24/18 12:01  80 21 163/84 (110) 96   


 


3/24/18 12:00     96 Nasal Cannula 3.0 


 


3/24/18 11:31  79 16 158/74 (102) 95   


 


3/24/18 11:01  80 16 150/78 (102) 96   


 


3/24/18 10:31  96 12 164/81 (108) 95 Nasal Cannula 3.0 


 


3/24/18 10:30  95 5  95   


 


3/24/18 10:30  95 5  95   


 


3/24/18 10:01  98 19 171/83 (112) 96 Nasal Cannula 3.0 


 


3/24/18 10:00  98 12  95   


 


3/24/18 09:31  100 22 167/80 (109) 95 Nasal Cannula 3.0 


 


3/24/18 09:30  101 22  95 Nasal Cannula 3.0 


 


3/24/18 09:00  101 20 167/80 (109) 94 Nasal Cannula 3.0 


 


3/24/18 09:00  101 20  94   


 


3/24/18 08:31  93 22 188/96 (126) 98 Oxymask  


 


3/24/18 08:00  93 21  96 Oxymask  


 


3/24/18 08:00     96 Oxymask 5.0 


 


3/24/18 07:31  92 20 178/97 (124) 96 Oxymask  


 


3/24/18 07:00  90 24  96 Oxymask  


 


3/24/18 06:01  87 21 166/85 (112) 96 Oxymask 5.0 


 


3/24/18 05:42  97  171/94    


 


3/24/18 05:31  97 21 171/94 (119) 97   


 


3/24/18 05:01  97 25 175/89 (117) 97   


 


3/24/18 04:31  96 8 161/83 (109) 91   


 


3/24/18 04:01 36.9 100 13 164/78 (106) 95 Oxymask 5.0 


 


3/24/18 04:00      Oxymask 5.0 


 


3/24/18 03:31  107 17 173/94 (120) 94   


 


3/24/18 03:01  103 25 178/73 (108) 95   


 


3/24/18 02:31  92 23 190/79 (116) 91   


 


3/24/18 02:02  91 29 183/92 (122) 91   


 


3/24/18 01:32  90 28 166/79 (108) 92   


 


3/24/18 01:01  87 23 174/83 (113) 91   


 


3/24/18 00:01 36.6 83 4 164/79 (107) 95 Oxymask 5.0 


 


3/23/18 23:59      Oxymask 5.0 


 


3/23/18 23:33  85 20 157/121 (133) 96  5.0 


 


3/23/18 23:28  96  178/82    


 


3/23/18 23:16  95 9 178/82 (114) 89  5.0 


 


3/23/18 23:01  95 30 182/81 (114) 96  5.0 


 


3/23/18 22:00  82 18 183/98 (126) 98 Oxymask 5.0 


 


3/23/18 20:00      Oxymask 5.0 


 


3/23/18 20:00 36.8 76 17 162/84 (110) 95 Oxymask 5.0 


 


3/23/18 18:00  74 15 165/86 (112) 97 Oxymask 5.0 


 


3/23/18 17:10  80  177/87    


 


3/23/18 16:00      Oxymask 5.0 


 


3/23/18 16:00 36.5 80 16 177/87 (117) 94 Oxymask 5.0 


 


3/23/18 14:00  125 24 138/86 (103) 94 Oxymask 5.0 


 


3/23/18 13:01  114 23 172/89 (116) 92 Oxymask 5.0 


 


3/23/18 13:00  126 25  93   


 


3/23/18 12:50  106 23 175/83 (113) 94   


 


3/23/18 12:46  119 25  92 Oxymask 5.0 








Laboratory Results:





Last 24 Hours








Test


  3/23/18


12:57 3/23/18


18:08 3/23/18


18:23 3/24/18


01:45


 


Sodium Level 138 mmol/L    


 


Potassium Level 4.2 mmol/L    


 


Chloride Level 109 mmol/L    


 


Carbon Dioxide Level 19 mmol/L    


 


Anion Gap 10.0 mmol/L    


 


Blood Urea Nitrogen 73 mg/dl    


 


Creatinine 2.19 mg/dl    


 


Est Creatinine Clear Calc


Drug Dose 23.6 ml/min 


  


  


  


 


 


Estimated GFR (


American) 27.5 


  


  


  


 


 


Estimated GFR (Non-


American 23.7 


  


  


  


 


 


BUN/Creatinine Ratio 33.1    


 


Random Glucose 110 mg/dl    


 


Calcium Level 8.8 mg/dl    


 


Phosphorus Level 4.5 mg/dl    


 


Magnesium Level 3.0 mg/dl    


 


Activated Partial


Thromboplast Time 


  45.5 SECONDS 


  


  52.0 SECONDS 


 


 


Partial Thromboplastin Ratio  1.8   2.0 


 


Bedside Glucose   124 mg/dl  


 


Test


  3/24/18


05:45 


  


  


 


 


White Blood Count 19.59 K/uL    


 


Red Blood Count 4.40 M/uL    


 


Hemoglobin 11.9 g/dL    


 


Hematocrit 35.0 %    


 


Mean Corpuscular Volume 79.5 fL    


 


Mean Corpuscular Hemoglobin 27.0 pg    


 


Mean Corpuscular Hemoglobin


Concent 34.0 g/dl 


  


  


  


 


 


Platelet Count 252 K/uL    


 


Mean Platelet Volume 11.3 fL    


 


Neutrophils (%) (Auto) 74.3 %    


 


Lymphocytes (%) (Auto) 10.9 %    


 


Monocytes (%) (Auto) 13.1 %    


 


Eosinophils (%) (Auto) 0.1 %    


 


Basophils (%) (Auto) 0.1 %    


 


Neutrophils # (Auto) 14.55 K/uL    


 


Lymphocytes # (Auto) 2.14 K/uL    


 


Monocytes # (Auto) 2.56 K/uL    


 


Eosinophils # (Auto) 0.02 K/uL    


 


Basophils # (Auto) 0.02 K/uL    


 


RDW Standard Deviation 38.2 fL    


 


RDW Coefficient of Variation 13.2 %    


 


Immature Granulocyte % (Auto) 1.5 %    


 


Immature Granulocyte # (Auto) 0.30 K/uL    


 


Activated Partial


Thromboplast Time 48.7 SECONDS 


  


  


  


 


 


Partial Thromboplastin Ratio 1.9    


 


Sodium Level 142 mmol/L    


 


Potassium Level 3.3 mmol/L    


 


Chloride Level 108 mmol/L    


 


Carbon Dioxide Level 25 mmol/L    


 


Anion Gap 9.0 mmol/L    


 


Blood Urea Nitrogen 46 mg/dl    


 


Creatinine 1.13 mg/dl    


 


Est Creatinine Clear Calc


Drug Dose 45.7 ml/min 


  


  


  


 


 


Estimated GFR (


American) 61.2 


  


  


  


 


 


Estimated GFR (Non-


American 52.8 


  


  


  


 


 


BUN/Creatinine Ratio 41.1    


 


Random Glucose 139 mg/dl    


 


Calcium Level 8.7 mg/dl    


 


Phosphorus Level 2.8 mg/dl    


 


Magnesium Level 2.2 mg/dl    


 


Procalcitonin 30.26 ng/ml

## 2018-03-24 NOTE — NEPHROLOGY PROGRESS NOTE
Nephrology Progress Note


Date of Service:


Mar 24, 2018.


Subjective


seen on rounds this am at about 0945; uop has picked up markedly; had received 

pain meds for L leg pain and participates minimally in ros





Objective











  Date Time  Temp Pulse Resp B/P (MAP) Pulse Ox O2 Delivery O2 Flow Rate FiO2


 


3/24/18 06:01  87 21 166/85 (112) 96 Oxymask 5.0 


 


3/24/18 05:42  97  171/94    


 


3/24/18 05:31  97 21 171/94 (119) 97   


 


3/24/18 05:01  97 25 175/89 (117) 97   


 


3/24/18 04:31  96 8 161/83 (109) 91   


 


3/24/18 04:01 36.9 100 13 164/78 (106) 95 Oxymask 5.0 


 


3/24/18 04:00      Oxymask 5.0 


 


3/24/18 03:31  107 17 173/94 (120) 94   


 


3/24/18 03:01  103 25 178/73 (108) 95   


 


3/24/18 02:31  92 23 190/79 (116) 91   


 


3/24/18 02:02  91 29 183/92 (122) 91   


 


3/24/18 01:32  90 28 166/79 (108) 92   


 


3/24/18 01:01  87 23 174/83 (113) 91   


 


3/24/18 00:01 36.6 83 4 164/79 (107) 95 Oxymask 5.0 


 


3/23/18 23:59      Oxymask 5.0 


 


3/23/18 23:33  85 20 157/121 (133) 96  5.0 


 


3/23/18 23:28  96  178/82    


 


3/23/18 23:16  95 9 178/82 (114) 89  5.0 


 


3/23/18 23:01  95 30 182/81 (114) 96  5.0 


 


3/23/18 22:00  82 18 183/98 (126) 98 Oxymask 5.0 


 


3/23/18 20:00      Oxymask 5.0 


 


3/23/18 20:00 36.8 76 17 162/84 (110) 95 Oxymask 5.0 


 


3/23/18 18:00  74 15 165/86 (112) 97 Oxymask 5.0 


 


3/23/18 17:10  80  177/87    


 


3/23/18 16:00      Oxymask 5.0 


 


3/23/18 16:00 36.5 80 16 177/87 (117) 94 Oxymask 5.0 


 


3/23/18 14:00  125 24 138/86 (103) 94 Oxymask 5.0 


 


3/23/18 13:01  114 23 172/89 (116) 92 Oxymask 5.0 


 


3/23/18 13:00  126 25  93   


 


3/23/18 12:50  106 23 175/83 (113) 94   


 


3/23/18 12:46  119 25  92 Oxymask 5.0 


 


3/23/18 12:31  127 24  90   


 


3/23/18 12:30  121 22  90 Oxymask 5.0 


 


3/23/18 12:16  132 21  92   


 


3/23/18 12:01 36.7 134 22  93 Oxymask 5.0 


 


3/23/18 12:00      Oxymask 5.0 


 


3/23/18 12:00  124   92   


 


3/23/18 11:47  124 22  92 Oxymask 5.0 


 


3/23/18 11:31  132 30  91   


 


3/23/18 11:30  140 19  92   


 


3/23/18 11:16  143 31  90 Oxymask 5.0 


 


3/23/18 11:01  98 18  85   


 


3/23/18 11:00  117 36  90 Oxymask 5.0 


 


3/23/18 10:31  136 20  91   


 


3/23/18 10:30  133 23  92 Oxymask 5.0 


 


3/23/18 10:16  164 29  92   


 


3/23/18 10:13  121 27  92 Oxymask 5.0 


 


3/23/18 10:09  109 25     


 


3/23/18 09:31  111 22  98 Oxymask 5.0 


 


3/23/18 09:30  112 23  100   


 


3/23/18 09:16  129 24  91 Oxymask 5.0 








Physical Exam:


General-ox1, restless but lethargic; on oxymask 


Eyes-no scleral icterus


ENT-dry mm; NGT +


Neck-supple


Lungs-decreased at bases


Heart-regular in 90s


Abdomen-distended, hypoactive bowel sounds w/ nguyen


Extremities-+2 dependent/prox edema left>right, tender


Neuro- confused





Current Inpatient Medications








 Medications


  (Trade)  Dose


 Ordered  Sig/Wilman


 Route  Start Time


 Stop Time Status Last Admin


Dose Admin


 


 Acetaminophen


  (Tylenol Tab)  325 mg  Q6H  PRN


 PO  3/20/18 03:15


 4/19/18 03:14  3/20/18 23:29


325 MG


 


 Prochlorperazine


 Edisylate 5 mg/


 Syringe  5 ml @ 5


 mls/min  Q6H  PRN


 IV  3/20/18 03:15


 4/19/18 03:14   


 


 


 Metronidazole 500


 mg/Prmx  100 ml @ 


 100 mls/hr  Q8H


 IV  3/20/18 14:00


 3/30/18 13:59  3/24/18 05:42


100 MLS/HR


 


 Pantoprazole


 Sodium 40 mg/


 Syringe  10 ml @ 5


 mls/min  DAILY@11


 IV  3/21/18 11:00


 4/20/18 10:59  3/23/18 11:53


5 MLS/MIN


 


 Raspberry


  (Raspberry Syrup


 5ml Cup)  5 ml  QID


 PO  3/21/18 09:30


 4/4/18 09:29  3/24/18 08:33


5 ML


 


 Vancomycin HCl


  (Vancomycin Oral


 Soln)  500 mg  QID


 PO  3/22/18 17:00


 4/5/18 16:59  3/24/18 08:33


500 MG


 


 Sodium


 Bicarbonate 75


 meq/Dextrose/


 Sodium Chloride  1,075 ml @ 


 75 mls/hr  [id number]


 IV  3/22/18 16:20


 4/21/18 16:19  3/23/18 20:56


75 MLS/HR


 


 Diltiazem HCl 125


 mg/Dextrose  125 ml @ 0


 mls/hr  Q0M PRN


 IV  3/23/18 08:15


 4/22/18 08:14  3/23/18 09:09


5 MLS/HR


 


 Hydromorphone HCl


  (Dilaudid Inj)  0.5 mg  Q3H  PRN


 IV  3/23/18 10:45


 4/6/18 10:44  3/24/18 08:34


0.5 MG


 


 Ceftriaxone


 Sodium 2 gm/


 Dextrose  50 ml @ 


 100 mls/hr  Q24H


 IV  3/23/18 18:00


 4/1/18 17:59  3/23/18 18:13


100 MLS/HR


 


 Heparin Sodium/


 Dextrose  500 ml @ 


 13 mls/hr  Q24H


 IV  3/23/18 11:30


 4/22/18 11:29  3/23/18 19:43


13 MLS/HR


 


 Metoprolol


 Tartrate


  (Lopressor Iv)  2.5 mg  Q6


 IV.  3/23/18 18:00


 4/22/18 17:59  3/24/18 05:42


2.5 MG


 


 Hydralazine HCl


  (HydrALAZINE INJ)  10 mg  Q4  PRN


 IV.  3/23/18 17:15


 4/22/18 17:14  3/24/18 08:33


10 MG


 


 Metoprolol


 Tartrate


  (Lopressor Tab)  25 mg  BID


 PO  3/24/18 09:00


 4/23/18 08:59 UNV  


 


 


 Potassium


 Phosphate


  (Potassium


 Phosphate


 Replacement)  30 mmol  NOW  STAT


 IV  3/24/18 08:53


 3/24/18 08:54 UNV  


 











Last 24 Hours








Test


  3/23/18


10:47 3/23/18


11:50 3/23/18


12:57 3/23/18


18:08


 


Lactic Acid Level 1.7 mmol/L    


 


Bedside Glucose  112 mg/dl   


 


Sodium Level   138 mmol/L  


 


Potassium Level   4.2 mmol/L  


 


Chloride Level   109 mmol/L  


 


Carbon Dioxide Level   19 mmol/L  


 


Anion Gap   10.0 mmol/L  


 


Blood Urea Nitrogen   73 mg/dl  


 


Creatinine   2.19 mg/dl  


 


Est Creatinine Clear Calc


Drug Dose 


  


  23.6 ml/min 


  


 


 


Estimated GFR (


American) 


  


  27.5 


  


 


 


Estimated GFR (Non-


American 


  


  23.7 


  


 


 


BUN/Creatinine Ratio   33.1  


 


Random Glucose   110 mg/dl  


 


Calcium Level   8.8 mg/dl  


 


Phosphorus Level   4.5 mg/dl  


 


Magnesium Level   3.0 mg/dl  


 


Activated Partial


Thromboplast Time 


  


  


  45.5 SECONDS 


 


 


Partial Thromboplastin Ratio    1.8 


 


Test


  3/23/18


18:23 3/24/18


01:45 3/24/18


05:45 


 


 


Bedside Glucose 124 mg/dl    


 


Activated Partial


Thromboplast Time 


  52.0 SECONDS 


  48.7 SECONDS 


  


 


 


Partial Thromboplastin Ratio  2.0  1.9  


 


White Blood Count   19.59 K/uL  


 


Red Blood Count   4.40 M/uL  


 


Hemoglobin   11.9 g/dL  


 


Hematocrit   35.0 %  


 


Mean Corpuscular Volume   79.5 fL  


 


Mean Corpuscular Hemoglobin   27.0 pg  


 


Mean Corpuscular Hemoglobin


Concent 


  


  34.0 g/dl 


  


 


 


Platelet Count   252 K/uL  


 


Mean Platelet Volume   11.3 fL  


 


Neutrophils (%) (Auto)   74.3 %  


 


Lymphocytes (%) (Auto)   10.9 %  


 


Monocytes (%) (Auto)   13.1 %  


 


Eosinophils (%) (Auto)   0.1 %  


 


Basophils (%) (Auto)   0.1 %  


 


Neutrophils # (Auto)   14.55 K/uL  


 


Lymphocytes # (Auto)   2.14 K/uL  


 


Monocytes # (Auto)   2.56 K/uL  


 


Eosinophils # (Auto)   0.02 K/uL  


 


Basophils # (Auto)   0.02 K/uL  


 


RDW Standard Deviation   38.2 fL  


 


RDW Coefficient of Variation   13.2 %  


 


Immature Granulocyte % (Auto)   1.5 %  


 


Immature Granulocyte # (Auto)   0.30 K/uL  


 


Sodium Level   142 mmol/L  


 


Potassium Level   3.3 mmol/L  


 


Chloride Level   108 mmol/L  


 


Carbon Dioxide Level   25 mmol/L  


 


Anion Gap   9.0 mmol/L  


 


Blood Urea Nitrogen   46 mg/dl  


 


Creatinine   1.13 mg/dl  


 


Est Creatinine Clear Calc


Drug Dose 


  


  45.7 ml/min 


  


 


 


Estimated GFR (


American) 


  


  61.2 


  


 


 


Estimated GFR (Non-


American 


  


  52.8 


  


 


 


BUN/Creatinine Ratio   41.1  


 


Random Glucose   139 mg/dl  


 


Calcium Level   8.7 mg/dl  


 


Phosphorus Level   2.8 mg/dl  


 


Magnesium Level   2.2 mg/dl  


 


Procalcitonin   30.26 ng/ml  











Assessment & Plan


63 yo female with shemar, ecoli bacteremia, cdiff, with right sided heart failure.





shemar-atn-non-oliguric with 4L UOP net negative yesterday, suggestive of late atn 

phase and creatinien improved too.  chemistries improved/acceptable>> ck 

trended down. 


-for one more day if possible diuretics prn only





metabolic acidosis resolved now w/ lower K and higher / uncontrolled BP >> 

changed D5 1/2 NS w/ 75 bicarbonate to D5 1/2 NS at 75 ml hourly; daily bmp; 

agree w/ TF





HTN per critical care; has multiple prn meds

## 2018-03-24 NOTE — SURGERY PROGRESS NOTE
Surgery Progress Note


Date of Service


Mar 24, 2018.





Subjective


Post OP Day:  HD 5


still confused but more alert





Objective


Vital Signs:











  Date Time  Temp Pulse Resp B/P (MAP) Pulse Ox O2 Delivery O2 Flow Rate FiO2


 


3/24/18 10:31  96 12 164/81 (108) 95 Nasal Cannula 3.0 


 


3/24/18 10:30  95 5  95   


 


3/24/18 10:01  98 19 171/83 (112) 96 Nasal Cannula 3.0 


 


3/24/18 10:00  98 12  95   


 


3/24/18 09:31  100 22 167/80 (109) 95 Nasal Cannula 3.0 


 


3/24/18 09:30  101 22  95 Nasal Cannula 3.0 


 


3/24/18 09:00  101 20 167/80 (109) 94 Nasal Cannula 3.0 


 


3/24/18 09:00  101 20  94   


 


3/24/18 08:31  93 22 188/96 (126) 98 Oxymask  


 


3/24/18 08:00  93 21  96 Oxymask  


 


3/24/18 08:00     96 Oxymask 5.0 


 


3/24/18 07:31  92 20 178/97 (124) 96 Oxymask  


 


3/24/18 07:00  90 24  96 Oxymask  


 


3/24/18 06:01  87 21 166/85 (112) 96 Oxymask 5.0 


 


3/24/18 05:42  97  171/94    


 


3/24/18 05:31  97 21 171/94 (119) 97   


 


3/24/18 05:01  97 25 175/89 (117) 97   


 


3/24/18 04:31  96 8 161/83 (109) 91   


 


3/24/18 04:01 36.9 100 13 164/78 (106) 95 Oxymask 5.0 


 


3/24/18 04:00      Oxymask 5.0 


 


3/24/18 03:31  107 17 173/94 (120) 94   


 


3/24/18 03:01  103 25 178/73 (108) 95   


 


3/24/18 02:31  92 23 190/79 (116) 91   


 


3/24/18 02:02  91 29 183/92 (122) 91   


 


3/24/18 01:32  90 28 166/79 (108) 92   


 


3/24/18 01:01  87 23 174/83 (113) 91   


 


3/24/18 00:01 36.6 83 4 164/79 (107) 95 Oxymask 5.0 


 


3/23/18 23:59      Oxymask 5.0 


 


3/23/18 23:33  85 20 157/121 (133) 96  5.0 


 


3/23/18 23:28  96  178/82    


 


3/23/18 23:16  95 9 178/82 (114) 89  5.0 


 


3/23/18 23:01  95 30 182/81 (114) 96  5.0 


 


3/23/18 22:00  82 18 183/98 (126) 98 Oxymask 5.0 


 


3/23/18 20:00      Oxymask 5.0 


 


3/23/18 20:00 36.8 76 17 162/84 (110) 95 Oxymask 5.0 


 


3/23/18 18:00  74 15 165/86 (112) 97 Oxymask 5.0 


 


3/23/18 17:10  80  177/87    


 


3/23/18 16:00      Oxymask 5.0 


 


3/23/18 16:00 36.5 80 16 177/87 (117) 94 Oxymask 5.0 


 


3/23/18 14:00  125 24 138/86 (103) 94 Oxymask 5.0 


 


3/23/18 13:01  114 23 172/89 (116) 92 Oxymask 5.0 


 


3/23/18 13:00  126 25  93   


 


3/23/18 12:50  106 23 175/83 (113) 94   


 


3/23/18 12:46  119 25  92 Oxymask 5.0 


 


3/23/18 12:31  127 24  90   


 


3/23/18 12:30  121 22  90 Oxymask 5.0 


 


3/23/18 12:16  132 21  92   


 


3/23/18 12:01 36.7 134 22  93 Oxymask 5.0 


 


3/23/18 12:00      Oxymask 5.0 


 


3/23/18 12:00  124   92   


 


3/23/18 11:47  124 22  92 Oxymask 5.0 


 


3/23/18 11:31  132 30  91   


 


3/23/18 11:30  140 19  92   


 


3/23/18 11:16  143 31  90 Oxymask 5.0 








General Appearance:  WD/WN, no apparent distress


Head:  normocephalic, atraumatic


Neck:  supple, trachea midline


Respiratory/Chest:  chest non-tender, lungs clear


Cardiovascular:  regular rate, rhythm


Abdomen:  soft, + abnormal bowel sounds (few sounds), + distended, + tenderness


Extremities:  non-tender, + pedal edema


Laboratory Results:





Results Past 24 Hours








Test


  3/23/18


11:50 3/23/18


12:57 3/23/18


18:08 3/23/18


18:23 Range/Units


 


 


Bedside Glucose 112   124 70-90  mg/dl


 


Sodium Level  138   136-145  mmol/L


 


Potassium Level  4.2   3.5-5.1  mmol/L


 


Chloride Level  109     mmol/L


 


Carbon Dioxide Level  19   21-32  mmol/L


 


Anion Gap  10.0   3-11  mmol/L


 


Blood Urea Nitrogen  73   7-18  mg/dl


 


Creatinine


  


  2.19


  


  


  0.60-1.20


mg/dl


 


Est Creatinine Clear Calc


Drug Dose 


  23.6


  


  


   ml/min


 


 


Estimated GFR (


American) 


  27.5


  


  


   


 


 


Estimated GFR (Non-


American 


  23.7


  


  


   


 


 


BUN/Creatinine Ratio  33.1   10-20  


 


Random Glucose  110   70-99  mg/dl


 


Calcium Level  8.8   8.5-10.1  mg/dl


 


Phosphorus Level  4.5   2.5-4.9  mg/dl


 


Magnesium Level  3.0   1.8-2.4  mg/dl


 


Activated Partial


Thromboplast Time 


  


  45.5


  


  21.0-31.0


SECONDS


 


Partial Thromboplastin Ratio   1.8   


 


Test


  3/24/18


01:45 3/24/18


05:45 


  


  Range/Units


 


 


Activated Partial


Thromboplast Time 52.0


  48.7


  


  


  21.0-31.0


SECONDS


 


Partial Thromboplastin Ratio 2.0 1.9    


 


White Blood Count  19.59   4.8-10.8  K/uL


 


Red Blood Count  4.40   4.2-5.4  M/uL


 


Hemoglobin  11.9   12.0-16.0  g/dL


 


Hematocrit  35.0   37-47  %


 


Mean Corpuscular Volume  79.5     fL


 


Mean Corpuscular Hemoglobin  27.0   25-34  pg


 


Mean Corpuscular Hemoglobin


Concent 


  34.0


  


  


  32-36  g/dl


 


 


Platelet Count  252   130-400  K/uL


 


Mean Platelet Volume  11.3   7.4-10.4  fL


 


Neutrophils (%) (Auto)  74.3    %


 


Lymphocytes (%) (Auto)  10.9    %


 


Monocytes (%) (Auto)  13.1    %


 


Eosinophils (%) (Auto)  0.1    %


 


Basophils (%) (Auto)  0.1    %


 


Neutrophils # (Auto)  14.55   1.4-6.5  K/uL


 


Lymphocytes # (Auto)  2.14   1.2-3.4  K/uL


 


Monocytes # (Auto)  2.56   0.11-0.59  K/uL


 


Eosinophils # (Auto)  0.02   0-0.5  K/uL


 


Basophils # (Auto)  0.02   0-0.2  K/uL


 


RDW Standard Deviation  38.2   36.4-46.3  fL


 


RDW Coefficient of Variation  13.2   11.5-14.5  %


 


Immature Granulocyte % (Auto)  1.5    %


 


Immature Granulocyte # (Auto)  0.30   0.00-0.02  K/uL


 


Sodium Level  142   136-145  mmol/L


 


Potassium Level  3.3   3.5-5.1  mmol/L


 


Chloride Level  108     mmol/L


 


Carbon Dioxide Level  25   21-32  mmol/L


 


Anion Gap  9.0   3-11  mmol/L


 


Blood Urea Nitrogen  46   7-18  mg/dl


 


Creatinine


  


  1.13


  


  


  0.60-1.20


mg/dl


 


Est Creatinine Clear Calc


Drug Dose 


  45.7


  


  


   ml/min


 


 


Estimated GFR (


American) 


  61.2


  


  


   


 


 


Estimated GFR (Non-


American 


  52.8


  


  


   


 


 


BUN/Creatinine Ratio  41.1   10-20  


 


Random Glucose  139   70-99  mg/dl


 


Calcium Level  8.7   8.5-10.1  mg/dl


 


Phosphorus Level  2.8   2.5-4.9  mg/dl


 


Magnesium Level  2.2   1.8-2.4  mg/dl


 


Procalcitonin  30.26   0-0.5  ng/ml











Assessment & Plan


Sepsis from c.diff colitis


-responding to abx


-WBC down


-bladder pressures improved


-UOP OK


-slow progress

## 2018-03-25 VITALS — HEART RATE: 82 BPM | DIASTOLIC BLOOD PRESSURE: 93 MMHG | OXYGEN SATURATION: 97 % | SYSTOLIC BLOOD PRESSURE: 158 MMHG

## 2018-03-25 VITALS — SYSTOLIC BLOOD PRESSURE: 157 MMHG | OXYGEN SATURATION: 96 % | DIASTOLIC BLOOD PRESSURE: 99 MMHG | HEART RATE: 80 BPM

## 2018-03-25 VITALS — HEART RATE: 70 BPM | DIASTOLIC BLOOD PRESSURE: 79 MMHG | OXYGEN SATURATION: 95 % | SYSTOLIC BLOOD PRESSURE: 150 MMHG

## 2018-03-25 VITALS — HEART RATE: 68 BPM | SYSTOLIC BLOOD PRESSURE: 136 MMHG | OXYGEN SATURATION: 96 % | DIASTOLIC BLOOD PRESSURE: 81 MMHG

## 2018-03-25 VITALS
DIASTOLIC BLOOD PRESSURE: 94 MMHG | TEMPERATURE: 98.96 F | SYSTOLIC BLOOD PRESSURE: 170 MMHG | OXYGEN SATURATION: 98 % | HEART RATE: 89 BPM

## 2018-03-25 VITALS — HEART RATE: 86 BPM | SYSTOLIC BLOOD PRESSURE: 167 MMHG | OXYGEN SATURATION: 94 % | DIASTOLIC BLOOD PRESSURE: 94 MMHG

## 2018-03-25 VITALS — OXYGEN SATURATION: 95 % | SYSTOLIC BLOOD PRESSURE: 150 MMHG | DIASTOLIC BLOOD PRESSURE: 83 MMHG | HEART RATE: 74 BPM

## 2018-03-25 VITALS
OXYGEN SATURATION: 91 % | DIASTOLIC BLOOD PRESSURE: 93 MMHG | HEART RATE: 69 BPM | SYSTOLIC BLOOD PRESSURE: 158 MMHG | TEMPERATURE: 98.06 F

## 2018-03-25 VITALS
DIASTOLIC BLOOD PRESSURE: 88 MMHG | HEART RATE: 74 BPM | SYSTOLIC BLOOD PRESSURE: 159 MMHG | OXYGEN SATURATION: 100 % | TEMPERATURE: 98.24 F

## 2018-03-25 VITALS — OXYGEN SATURATION: 97 % | HEART RATE: 120 BPM | SYSTOLIC BLOOD PRESSURE: 145 MMHG | DIASTOLIC BLOOD PRESSURE: 111 MMHG

## 2018-03-25 VITALS
TEMPERATURE: 98.24 F | SYSTOLIC BLOOD PRESSURE: 169 MMHG | HEART RATE: 74 BPM | OXYGEN SATURATION: 96 % | DIASTOLIC BLOOD PRESSURE: 95 MMHG

## 2018-03-25 VITALS — DIASTOLIC BLOOD PRESSURE: 83 MMHG | HEART RATE: 72 BPM | SYSTOLIC BLOOD PRESSURE: 142 MMHG | OXYGEN SATURATION: 95 %

## 2018-03-25 VITALS — HEART RATE: 135 BPM | SYSTOLIC BLOOD PRESSURE: 143 MMHG | OXYGEN SATURATION: 97 % | DIASTOLIC BLOOD PRESSURE: 102 MMHG

## 2018-03-25 VITALS — DIASTOLIC BLOOD PRESSURE: 78 MMHG | OXYGEN SATURATION: 96 % | HEART RATE: 67 BPM | SYSTOLIC BLOOD PRESSURE: 136 MMHG

## 2018-03-25 VITALS — HEART RATE: 89 BPM | OXYGEN SATURATION: 96 % | SYSTOLIC BLOOD PRESSURE: 176 MMHG | DIASTOLIC BLOOD PRESSURE: 90 MMHG

## 2018-03-25 VITALS — HEART RATE: 73 BPM | DIASTOLIC BLOOD PRESSURE: 88 MMHG | SYSTOLIC BLOOD PRESSURE: 154 MMHG | OXYGEN SATURATION: 97 %

## 2018-03-25 VITALS — OXYGEN SATURATION: 96 % | HEART RATE: 78 BPM | SYSTOLIC BLOOD PRESSURE: 158 MMHG | DIASTOLIC BLOOD PRESSURE: 93 MMHG

## 2018-03-25 VITALS
SYSTOLIC BLOOD PRESSURE: 126 MMHG | TEMPERATURE: 98.06 F | DIASTOLIC BLOOD PRESSURE: 79 MMHG | HEART RATE: 72 BPM | OXYGEN SATURATION: 97 %

## 2018-03-25 VITALS — OXYGEN SATURATION: 97 % | SYSTOLIC BLOOD PRESSURE: 177 MMHG | HEART RATE: 86 BPM | DIASTOLIC BLOOD PRESSURE: 94 MMHG

## 2018-03-25 LAB
BUN SERPL-MCNC: 28 MG/DL (ref 7–18)
CALCIUM SERPL-MCNC: 8.5 MG/DL (ref 8.5–10.1)
CO2 SERPL-SCNC: 27 MMOL/L (ref 21–32)
CREAT SERPL-MCNC: 0.77 MG/DL (ref 0.6–1.2)
EOSINOPHIL NFR BLD AUTO: 296 K/UL (ref 130–400)
GLUCOSE SERPL-MCNC: 156 MG/DL (ref 70–99)
HCT VFR BLD CALC: 33.5 % (ref 37–47)
HGB BLD-MCNC: 11.5 G/DL (ref 12–16)
MCH RBC QN AUTO: 27.9 PG (ref 25–34)
MCHC RBC AUTO-ENTMCNC: 34.3 G/DL (ref 32–36)
MCV RBC AUTO: 81.3 FL (ref 80–100)
NRBC BLD AUTO-RTO: 0.3 %
NUCLEATED RED BLOOD CELL ABS: 0.06 K/UL (ref 0–0)
PHOSPHATE SERPL-MCNC: 2.6 MG/DL (ref 2.5–4.9)
PMV BLD AUTO: 11.3 FL (ref 7.4–10.4)
POTASSIUM SERPL-SCNC: 3.1 MMOL/L (ref 3.5–5.1)
PTT PATIENT: 50.7 SECONDS (ref 21–31)
RED CELL DISTRIBUTION WIDTH CV: 13.7 % (ref 11.5–14.5)
RED CELL DISTRIBUTION WIDTH SD: 40.5 FL (ref 36.4–46.3)
SODIUM SERPL-SCNC: 144 MMOL/L (ref 136–145)
WBC # BLD AUTO: 17.67 K/UL (ref 4.8–10.8)

## 2018-03-25 RX ADMIN — METOPROLOL TARTRATE SCH MG: 50 TABLET, FILM COATED ORAL at 21:52

## 2018-03-25 RX ADMIN — DEXTROSE AND SODIUM CHLORIDE SCH MLS/HR: 5; .45 INJECTION, SOLUTION INTRAVENOUS at 07:47

## 2018-03-25 RX ADMIN — NYSTATIN SCH ML: 100000 SUSPENSION ORAL at 07:43

## 2018-03-25 RX ADMIN — APIXABAN SCH MG: 2.5 TABLET, FILM COATED ORAL at 16:17

## 2018-03-25 RX ADMIN — METOPROLOL TARTRATE SCH MG: 50 TABLET, FILM COATED ORAL at 07:42

## 2018-03-25 RX ADMIN — PANTOPRAZOLE SODIUM SCH MLS/MIN: 40 INJECTION, POWDER, FOR SOLUTION INTRAVENOUS at 12:10

## 2018-03-25 RX ADMIN — VANCOMYCIN HYDROCHLORIDE SCH MG: 1 INJECTION, POWDER, LYOPHILIZED, FOR SOLUTION INTRAVENOUS at 16:18

## 2018-03-25 RX ADMIN — NYSTATIN SCH ML: 100000 SUSPENSION ORAL at 21:53

## 2018-03-25 RX ADMIN — NYSTATIN SCH ML: 100000 SUSPENSION ORAL at 12:11

## 2018-03-25 RX ADMIN — METOPROLOL TARTRATE SCH MG: 50 TABLET, FILM COATED ORAL at 12:12

## 2018-03-25 RX ADMIN — NYSTATIN SCH ML: 100000 SUSPENSION ORAL at 16:19

## 2018-03-25 RX ADMIN — METRONIDAZOLE SCH MLS/HR: 500 INJECTION, SOLUTION INTRAVENOUS at 21:53

## 2018-03-25 RX ADMIN — Medication SCH MEQ: at 16:19

## 2018-03-25 RX ADMIN — DEXTROSE AND SODIUM CHLORIDE SCH MLS/HR: 5; .45 INJECTION, SOLUTION INTRAVENOUS at 21:52

## 2018-03-25 RX ADMIN — METRONIDAZOLE SCH MLS/HR: 500 INJECTION, SOLUTION INTRAVENOUS at 05:54

## 2018-03-25 RX ADMIN — METRONIDAZOLE SCH MLS/HR: 500 INJECTION, SOLUTION INTRAVENOUS at 14:16

## 2018-03-25 RX ADMIN — Medication SCH ML: at 07:43

## 2018-03-25 RX ADMIN — Medication SCH ML: at 07:42

## 2018-03-25 RX ADMIN — VANCOMYCIN HYDROCHLORIDE SCH MG: 1 INJECTION, POWDER, LYOPHILIZED, FOR SOLUTION INTRAVENOUS at 12:12

## 2018-03-25 RX ADMIN — HYDROMORPHONE HYDROCHLORIDE PRN MG: 1 INJECTION, SOLUTION INTRAMUSCULAR; INTRAVENOUS; SUBCUTANEOUS at 07:47

## 2018-03-25 RX ADMIN — VANCOMYCIN HYDROCHLORIDE SCH MG: 1 INJECTION, POWDER, LYOPHILIZED, FOR SOLUTION INTRAVENOUS at 21:53

## 2018-03-25 RX ADMIN — METOPROLOL TARTRATE PRN MG: 5 INJECTION, SOLUTION INTRAVENOUS at 06:47

## 2018-03-25 RX ADMIN — Medication SCH ML: at 21:53

## 2018-03-25 RX ADMIN — VANCOMYCIN HYDROCHLORIDE SCH MG: 1 INJECTION, POWDER, LYOPHILIZED, FOR SOLUTION INTRAVENOUS at 07:44

## 2018-03-25 RX ADMIN — METOPROLOL TARTRATE PRN MG: 5 INJECTION, SOLUTION INTRAVENOUS at 00:29

## 2018-03-25 RX ADMIN — Medication SCH ML: at 12:11

## 2018-03-25 RX ADMIN — HEPARIN SODIUM SCH MLS/HR: 5000 INJECTION, SOLUTION INTRAVENOUS at 12:10

## 2018-03-25 RX ADMIN — Medication SCH ML: at 16:19

## 2018-03-25 RX ADMIN — CEFTRIAXONE SODIUM SCH MLS/HR: 1 INJECTION, POWDER, FOR SOLUTION INTRAVENOUS at 17:39

## 2018-03-25 NOTE — CRITICAL CARE PROGRESS NOTE
Critical Care Progress Note


Date of Service


Mar 25, 2018.





Attending


Dr. Sommers





Subjective


The patient is becoming more awake, following commands and answering questions, 

she denies any pain, no events occurred overnight, one episode of rapid A. fib 

responded to a single dose of addition of Lopressor IV.





Objective


Her physical exam on 3/22/2018 revealed no fever, O2 saturation is 95%, blood 

pressure elevated to 186/96, probably due to agony.  Respiratory rate is 18.  

Positive JVP, S1-S2 regular rate and rhythm.  Distant breath sounds 

bilaterally.  Abdomen is distended and tender diffusely.  Left leg is more 

edematous than the right leg.  No pain.  Except below the knee area.  No calves 

pain.  Neurologically she is nonfocal but continued to be confused.





Her physical exam on 3/23/2018 revealed, no fever but her O2 saturation is 

variable between 88-93% on nasal cannula currently on oxygen mask.  The patient 

heart examination S1-S2 irregularly irregular with tachycardia accompanied with 

areas of pauses.  Bilateral lower ex extremity edema, the lungs actually with 

bilateral diminished breath sounds and minimal crackles.  Abdomen is still 

distended and tender mainly in the left lower quadrant.





Her labs were reviewed as well as a CAT scan of the abdomen which showed bowel 

wall thickening mainly in the ascending colon, ascites was noted and anasarca.  

Bilateral pleural effusion with compressive atelectasis also noted.





Physical exam on 3/24/2018 revealed lethargic patient, vital signs are stable, 

no recurrence of abnormal rhythm, heart rate in the range of 83, but the blood 

pressure remains in the range of 153, S1-S2 regular rate and rhythm, bilateral 

crackles mainly at the bases, abdomen is soft nontender except in the pelvic 

area, swelling in her left lower extremity has resolved, the patient overall is 

just lethargic but nonfocal.





Laboratory and imaging also were reviewed and her potassium was repleted.  I 

have reviewed all the data myself with Dr. Byrd who is her daughter.





Physical exam on 3/25/2018 revealed more awake female currently does not have 

any pain or agony, vital signs remained stable, O2 saturation has been 93% on 

nasal cannula, NG tube in place, positive JVP, S1-S2 regular rate and rhythm, 

distant breath sounds bilaterally, abdomen is soft and benign, no swelling in 

her left leg anymore.





Her laboratory also were reviewed showing reduction in her WBC, potassium has 

been on the low side, the rest of her labs are nonrevealing.  No new 

microbiology.





Assessment & Plan


1.  Sepsis, improving, due to E. coli bacteremia with pansensitive to 

antibiotics as well as C. difficile colitis.


2.  Right ventricular failure.  WITH severe pulmonary hypertension, etiology is 

unknown.


3.  C. difficile colitis, intra-abdominal pressure is improving and now down to 

11.


4.  Left leg edema with left sided retroperitoneal lymphadenopathy of unknown 

etiology, to be addressed in the future.


5.  Persistent hypertension.


6.  Cardiomyopathy with ejection fraction of 35%, fluid overload that responded 

to Lasix.


7.  Likely the patient has sick euthyroid syndrome.  This does not require any 

treatment.


8.  Difficulty swallowing due to lethargy, her mental status was altered and 

remains altered with marginal improvement on a daily basis.


9.  Acute kidney insufficiency, likely related to contrast-induced  nephropathy.





Plan:





1. continue with Vanco via the NG tube.


2.  Continue ceftriaxone 2 g daily for E. coli bacteremia.  This can be 

translocation from the GI tract but most likely from UTI recently.


3.  Increase Lopressor to 50 mg NG tube 3 times daily.


4.  Continue with Lopressor IV as needed.  For heart rate more than 120.


5.  Discussed with Dr. Byrd who is her daughter and notified about the plan.  

She is in agreement.


6.  Replacement of potassium with potassium chloride 40 mEq 2 doses.


7.  Continue to trend leukocytosis as it has been improving significantly.


8.  The patient swallowing has been problematic.  We will ask speech pathology 

in the morning to evaluate.


9.  Appreciate Dr. Nelson input from cardiology.


10.  Continue with heparin drip.


11.  I will start the patient empirically on Eliquis as she is not going for a 

procedure soon.


12.  Once the patient started on Eliquis, I will stop heparin drip.


13.  The patient is on Peptamen tube feeding and we will advance it to goal at 

40 mL an hour.  Appreciate nutrition consult.


14.  Renal function has returned to normal and improved.  The patient is not 

oliguric anymore.


15.  No need for additional Lasix.


16.  Once the patient at goal with the tube feeding, no need for further IV 

fluid.





Case discussed with the staff on rounds and details.  And with the patient's 

daughter Dr. Byrd.  All questions been answered.





Critical care time spent with the patient was 45 minutes.





Data


Medications:





Current Inpatient Medications








 Medications


  (Trade)  Dose


 Ordered  Sig/Wilman


 Route  Start Time


 Stop Time Status Last Admin


Dose Admin


 


 Acetaminophen


  (Tylenol Tab)  325 mg  Q6H  PRN


 PO  3/20/18 03:15


 4/19/18 03:14  3/20/18 23:29


325 MG


 


 Prochlorperazine


 Edisylate 5 mg/


 Syringe  5 ml @ 5


 mls/min  Q6H  PRN


 IV  3/20/18 03:15


 4/19/18 03:14   


 


 


 Metronidazole 500


 mg/Prmx  100 ml @ 


 100 mls/hr  Q8H


 IV  3/20/18 14:00


 3/30/18 13:59  3/25/18 05:54


100 MLS/HR


 


 Pantoprazole


 Sodium 40 mg/


 Syringe  10 ml @ 5


 mls/min  DAILY@11


 IV  3/21/18 11:00


 4/20/18 10:59  3/25/18 12:10


5 MLS/MIN


 


 Raspberry


  (Raspberry Syrup


 5ml Cup)  5 ml  QID


 PO  3/21/18 09:30


 4/4/18 09:29  3/25/18 12:11


5 ML


 


 Vancomycin HCl


  (Vancomycin Oral


 Soln)  500 mg  QID


 PO  3/22/18 17:00


 4/5/18 16:59  3/25/18 12:12


500 MG


 


 Hydromorphone HCl


  (Dilaudid Inj)  0.5 mg  Q3H  PRN


 IV  3/23/18 10:45


 4/6/18 10:44  3/25/18 07:47


0.5 MG


 


 Ceftriaxone


 Sodium 2 gm/


 Dextrose  50 ml @ 


 100 mls/hr  Q24H


 IV  3/23/18 18:00


 4/1/18 17:59  3/24/18 17:47


100 MLS/HR


 


 Heparin Sodium/


 Dextrose  500 ml @ 


 13 mls/hr  Q24H


 IV  3/23/18 11:30


 4/22/18 11:29  3/23/18 19:43


13 MLS/HR


 


 Hydralazine HCl


  (HydrALAZINE INJ)  10 mg  Q4  PRN


 IV.  3/23/18 17:15


 4/22/18 17:14  3/24/18 16:44


10 MG


 


 Enteral


 Nutritional


 Formula


  (Peptamen 1.5)  1,000 ml  UD


 NG  3/24/18 11:45


 4/23/18 11:44  3/24/18 12:26


1,000 ML


 


 Enteral


 Nutritional


 Formula


  (Prosource No


 Carb)  30 ml  DAILY@0900


 NG  3/24/18 12:30


 4/23/18 12:29  3/25/18 07:42


30 ML


 


 Nystatin


  (Mycostatin Susp)  5 ml  QID


 PO  3/24/18 19:30


 3/31/18 19:29  3/25/18 12:11


5 ML


 


 Dextrose/Sodium


 Chloride  1,000 ml @ 


 75 mls/hr  [id number]


 IV  3/24/18 18:15


 4/23/18 18:14  3/25/18 07:47


75 MLS/HR


 


 Metoprolol


 Tartrate


  (Lopressor Iv)  5 mg  Q6H  PRN


 IV  3/24/18 18:45


 4/23/18 18:44  3/25/18 06:47


5 MG


 


 Ondansetron HCl 6


 mg/Dextrose  53 ml @ 


 200 mls/hr  Q6H  PRN


 IV  3/25/18 00:30


 4/24/18 00:29  3/25/18 07:48


200 MLS/HR


 


 Metoprolol


 Tartrate


  (Lopressor Tab)  50 mg  TID


 PO  3/25/18 14:00


 4/23/18 20:59  3/25/18 12:12


50 MG


 


 Potassium Chloride


  (Kelli Ciel Elix)  40 meq  BID


 PO  3/25/18 21:00


 4/24/18 20:59   


 








Vital Signs:











  Date Time  Temp Pulse Resp B/P (MAP) Pulse Ox O2 Delivery O2 Flow Rate FiO2


 


3/25/18 10:01  73 19 154/88 (110) 97   


 


3/25/18 09:31  78 20 158/93 (114) 96   


 


3/25/18 09:01  80 19 157/99 (118) 96   


 


3/25/18 08:01 37.2 89 25 170/94 (119) 98 Nasal Cannula 2.0 


 


3/25/18 08:00      Nasal Cannula 2.0 


 


3/25/18 07:45  82 24 158/93 (114) 97   


 


3/25/18 07:01  135 28 143/102 (116) 97   


 


3/25/18 06:47  120  145/111    


 


3/25/18 06:00  120 23 145/111 (122) 97   


 


3/25/18 04:07      Nasal Cannula 3.0 


 


3/25/18 04:00  86 22 177/94 (121) 97   


 


3/25/18 02:00  89 27 176/90 (118) 96   


 


3/25/18 00:32      Nasal Cannula 3.0 


 


3/25/18 00:29  84  167/94    


 


3/25/18 00:01  86 26 167/94 (118) 94 Nasal Cannula 3.0 


 


3/24/18 22:00  84 23 153/78 (103) 96 Nasal Cannula 4.0 


 


3/24/18 20:58      Nasal Cannula 3.0 


 


3/24/18 19:31 36.4       


 


3/24/18 18:01 36.8 95 23 169/76 (107) 95   


 


3/24/18 17:31  97 28 163/75 (104) 95   


 


3/24/18 17:01  93 29 162/74 (103) 96   


 


3/24/18 16:44  140  180/97    


 


3/24/18 16:31  85 31 180/97 (124) 98   


 


3/24/18 16:31  85 31 180/97 (124) 98   


 


3/24/18 16:30  83 25 183/97 (125) 97   


 


3/24/18 16:30  83 25 183/97 (125) 97   


 


3/24/18 16:01  83 22 183/108 (133) 95   


 


3/24/18 16:00     96 Nasal Cannula 3.0 


 


3/24/18 15:01  84 22 163/87 (112) 96   


 


3/24/18 14:31  85 18 156/80 (105) 96   


 


3/24/18 14:01 36.9 88 29 155/79 (104) 95  3.0 


 


3/24/18 14:01  88 29 155/79 (104) 95   


 


3/24/18 13:31  85 18 162/82 (108) 96  3.0 


 


3/24/18 13:01  82 16 157/82 (107) 96  3.0 








Laboratory Results:





Last 24 Hours








Test


  3/24/18


21:22 3/25/18


05:46 3/25/18


11:43


 


Sodium Level 144 mmol/L  144 mmol/L  


 


Potassium Level 3.4 mmol/L  3.1 mmol/L  


 


Chloride Level 109 mmol/L  108 mmol/L  


 


Carbon Dioxide Level 29 mmol/L  27 mmol/L  


 


Anion Gap 6.0 mmol/L  9.0 mmol/L  


 


Blood Urea Nitrogen 34 mg/dl  28 mg/dl  


 


Creatinine 0.86 mg/dl  0.77 mg/dl  


 


Est Creatinine Clear Calc


Drug Dose 58.4 ml/min 


  64.8 ml/min 


  


 


 


Estimated GFR (


American) 85.1 


  97.3 


  


 


 


Estimated GFR (Non-


American 73.4 


  83.9 


  


 


 


BUN/Creatinine Ratio 39.4  36.9  


 


Random Glucose 151 mg/dl  156 mg/dl  


 


Calcium Level 8.1 mg/dl  8.5 mg/dl  


 


Phosphorus Level 2.7 mg/dl  2.6 mg/dl  


 


Magnesium Level 1.8 mg/dl  2.1 mg/dl  


 


White Blood Count  17.67 K/uL  


 


Red Blood Count  4.12 M/uL  


 


Hemoglobin  11.5 g/dL  


 


Hematocrit  33.5 %  


 


Mean Corpuscular Volume  81.3 fL  


 


Mean Corpuscular Hemoglobin  27.9 pg  


 


Mean Corpuscular Hemoglobin


Concent 


  34.3 g/dl 


  


 


 


RDW Standard Deviation  40.5 fL  


 


RDW Coefficient of Variation  13.7 %  


 


Platelet Count  296 K/uL  


 


Mean Platelet Volume  11.3 fL  


 


Nucleated RBC Absolute Count


(auto) 


  0.06 K/uL 


  


 


 


Nucleated Red Blood Cells %  0.3 %  


 


Activated Partial


Thromboplast Time 


  50.7 SECONDS 


  


 


 


Partial Thromboplastin Ratio  2.0  


 


Procalcitonin  11.75 ng/ml  


 


Bedside Glucose   187 mg/dl

## 2018-03-25 NOTE — CRITICAL CARE PROGRESS NOTE
Critical Care Progress Note


Date of Service


Mar 24, 2018.





Critical Care Progress Note


On reevaluation, patient's daughters present at bedside.  I discussed with 

family regarding continued care and current status.  Went over laboratory 

results to this point.  Patient complaining of severe nausea and actively 

attempting to vomit.  She was provided one-time dose of 4 mg Zofran 

intravenously.  In addition, we did temporarily stop the tube feeds and I 

requested nursing staff to perform residuals which were found to be 

approximately 5 cc.  On review of labs, the patient did have a potassium of 

3.3.  Given that the patient had been on bicarb drip as well as Lasix recently, 

I did elect to perform repeat PRP to evaluate potassium levels in the setting 

of recent dysrhythmias. Will continue to monitor.

## 2018-03-25 NOTE — SURGERY PROGRESS NOTE
Surgery Progress Note


Date of Service


Mar 25, 2018.





Subjective


Post OP Day:  HD 6


+ complaints (abdominal pain), + bowel movement, + flatus, + diet (TF via ngt)





Objective


Vital Signs:











  Date Time  Temp Pulse Resp B/P (MAP) Pulse Ox O2 Delivery O2 Flow Rate FiO2


 


3/25/18 06:47  120  145/111    


 


3/25/18 06:00  120 23 145/111 (122) 97   


 


3/25/18 04:07      Nasal Cannula 3.0 


 


3/25/18 04:00  86 22 177/94 (121) 97   


 


3/25/18 02:00  89 27 176/90 (118) 96   


 


3/25/18 00:32      Nasal Cannula 3.0 


 


3/25/18 00:29  84  167/94    


 


3/25/18 00:01  86 26 167/94 (118) 94 Nasal Cannula 3.0 


 


3/24/18 22:00  84 23 153/78 (103) 96 Nasal Cannula 4.0 


 


3/24/18 20:58      Nasal Cannula 3.0 


 


3/24/18 19:31 36.4       


 


3/24/18 18:01 36.8 95 23 169/76 (107) 95   


 


3/24/18 17:31  97 28 163/75 (104) 95   


 


3/24/18 17:01  93 29 162/74 (103) 96   


 


3/24/18 16:44  140  180/97    


 


3/24/18 16:31  85 31 180/97 (124) 98   


 


3/24/18 16:31  85 31 180/97 (124) 98   


 


3/24/18 16:30  83 25 183/97 (125) 97   


 


3/24/18 16:30  83 25 183/97 (125) 97   


 


3/24/18 16:01  83 22 183/108 (133) 95   


 


3/24/18 16:00     96 Nasal Cannula 3.0 


 


3/24/18 15:01  84 22 163/87 (112) 96   


 


3/24/18 14:31  85 18 156/80 (105) 96   


 


3/24/18 14:01 36.9 88 29 155/79 (104) 95  3.0 


 


3/24/18 14:01  88 29 155/79 (104) 95   


 


3/24/18 13:31  85 18 162/82 (108) 96  3.0 


 


3/24/18 13:01  82 16 157/82 (107) 96  3.0 


 


3/24/18 12:31  83 20 174/99 (124) 97  3.0 


 


3/24/18 12:14  81  163/84    


 


3/24/18 12:01 36.7 80 21 163/84 (110) 96   


 


3/24/18 12:00     96 Nasal Cannula 3.0 


 


3/24/18 11:31  79 16 158/74 (102) 95   


 


3/24/18 11:01  80 16 150/78 (102) 96   


 


3/24/18 10:31  96 12 164/81 (108) 95 Nasal Cannula 3.0 


 


3/24/18 10:30  95 5  95   


 


3/24/18 10:30  95 5  95   


 


3/24/18 10:01  98 19 171/83 (112) 96 Nasal Cannula 3.0 


 


3/24/18 10:00  98 12  95   


 


3/24/18 09:31  100 22 167/80 (109) 95 Nasal Cannula 3.0 


 


3/24/18 09:30  101 22  95 Nasal Cannula 3.0 


 


3/24/18 09:00  101 20 167/80 (109) 94 Nasal Cannula 3.0 


 


3/24/18 09:00  101 20  94   


 


3/24/18 08:31  93 22 188/96 (126) 98 Oxymask  


 


3/24/18 08:00  93 21  96 Oxymask  


 


3/24/18 08:00     96 Oxymask 5.0 








General Appearance:  WD/WN, no apparent distress


Head:  normocephalic, atraumatic


Neck:  supple, trachea midline


Respiratory/Chest:  lungs clear


Cardiovascular:  + tachycardia


Abdomen:  normal bowel sounds, soft, + distended (less), + tenderness (less)


Extremities:  non-tender, no pedal edema


Laboratory Results:





Results Past 24 Hours








Test


  3/24/18


21:22 3/25/18


05:46 Range/Units


 


 


Sodium Level 144 144 136-145  mmol/L


 


Potassium Level 3.4 3.1 3.5-5.1  mmol/L


 


Chloride Level 109 108   mmol/L


 


Carbon Dioxide Level 29 27 21-32  mmol/L


 


Anion Gap 6.0 9.0 3-11  mmol/L


 


Blood Urea Nitrogen 34 28 7-18  mg/dl


 


Creatinine


  0.86


  0.77


  0.60-1.20


mg/dl


 


Est Creatinine Clear Calc


Drug Dose 58.4


  64.8


   ml/min


 


 


Estimated GFR (


American) 85.1


  97.3


   


 


 


Estimated GFR (Non-


American 73.4


  83.9


   


 


 


BUN/Creatinine Ratio 39.4 36.9 10-20  


 


Random Glucose 151 156 70-99  mg/dl


 


Calcium Level 8.1 8.5 8.5-10.1  mg/dl


 


Phosphorus Level 2.7 2.6 2.5-4.9  mg/dl


 


Magnesium Level 1.8 2.1 1.8-2.4  mg/dl


 


White Blood Count  17.67 4.8-10.8  K/uL


 


Red Blood Count  4.12 4.2-5.4  M/uL


 


Hemoglobin  11.5 12.0-16.0  g/dL


 


Hematocrit  33.5 37-47  %


 


Mean Corpuscular Volume  81.3   fL


 


Mean Corpuscular Hemoglobin  27.9 25-34  pg


 


Mean Corpuscular Hemoglobin


Concent 


  34.3


  32-36  g/dl


 


 


RDW Standard Deviation  40.5 36.4-46.3  fL


 


RDW Coefficient of Variation  13.7 11.5-14.5  %


 


Platelet Count  296 130-400  K/uL


 


Mean Platelet Volume  11.3 7.4-10.4  fL


 


Nucleated RBC Absolute Count


(auto) 


  0.06


  0-0  K/uL


 


 


Nucleated Red Blood Cells %  0.3  %


 


Activated Partial


Thromboplast Time 


  50.7


  21.0-31.0


SECONDS


 


Partial Thromboplastin Ratio  2.0  


 


Procalcitonin  11.75 0-0.5  ng/ml











Assessment & Plan


Sepsis from c.diff colitis


-responding to abx


-WBC down


-more alert


-slow progress

## 2018-03-26 VITALS
TEMPERATURE: 97.88 F | HEART RATE: 66 BPM | DIASTOLIC BLOOD PRESSURE: 85 MMHG | OXYGEN SATURATION: 95 % | SYSTOLIC BLOOD PRESSURE: 149 MMHG

## 2018-03-26 VITALS
HEART RATE: 84 BPM | DIASTOLIC BLOOD PRESSURE: 114 MMHG | OXYGEN SATURATION: 95 % | SYSTOLIC BLOOD PRESSURE: 174 MMHG | TEMPERATURE: 97.52 F

## 2018-03-26 VITALS
HEART RATE: 83 BPM | OXYGEN SATURATION: 98 % | DIASTOLIC BLOOD PRESSURE: 127 MMHG | SYSTOLIC BLOOD PRESSURE: 205 MMHG | TEMPERATURE: 98.06 F

## 2018-03-26 VITALS — OXYGEN SATURATION: 95 % | TEMPERATURE: 97.52 F | HEART RATE: 72 BPM

## 2018-03-26 VITALS — DIASTOLIC BLOOD PRESSURE: 83 MMHG | SYSTOLIC BLOOD PRESSURE: 140 MMHG

## 2018-03-26 VITALS — DIASTOLIC BLOOD PRESSURE: 99 MMHG | SYSTOLIC BLOOD PRESSURE: 167 MMHG

## 2018-03-26 VITALS
SYSTOLIC BLOOD PRESSURE: 176 MMHG | DIASTOLIC BLOOD PRESSURE: 94 MMHG | HEART RATE: 68 BPM | TEMPERATURE: 97.34 F | OXYGEN SATURATION: 95 %

## 2018-03-26 VITALS
DIASTOLIC BLOOD PRESSURE: 82 MMHG | TEMPERATURE: 98.06 F | SYSTOLIC BLOOD PRESSURE: 131 MMHG | HEART RATE: 66 BPM | OXYGEN SATURATION: 96 %

## 2018-03-26 VITALS — OXYGEN SATURATION: 96 %

## 2018-03-26 LAB
BUN SERPL-MCNC: 32 MG/DL (ref 7–18)
CALCIUM SERPL-MCNC: 8.4 MG/DL (ref 8.5–10.1)
CO2 SERPL-SCNC: 27 MMOL/L (ref 21–32)
CREAT SERPL-MCNC: 0.81 MG/DL (ref 0.6–1.2)
EOSINOPHIL NFR BLD AUTO: 364 K/UL (ref 130–400)
GLUCOSE SERPL-MCNC: 242 MG/DL (ref 70–99)
HCT VFR BLD CALC: 35.3 % (ref 37–47)
HGB BLD-MCNC: 11.8 G/DL (ref 12–16)
MCH RBC QN AUTO: 27.8 PG (ref 25–34)
MCHC RBC AUTO-ENTMCNC: 33.4 G/DL (ref 32–36)
MCV RBC AUTO: 83.3 FL (ref 80–100)
NRBC BLD AUTO-RTO: 0.4 %
NUCLEATED RED BLOOD CELL ABS: 0.07 K/UL (ref 0–0)
PMV BLD AUTO: 11.2 FL (ref 7.4–10.4)
POTASSIUM SERPL-SCNC: 3.6 MMOL/L (ref 3.5–5.1)
PTT PATIENT: 35.4 SECONDS (ref 21–31)
RED CELL DISTRIBUTION WIDTH CV: 13.9 % (ref 11.5–14.5)
RED CELL DISTRIBUTION WIDTH SD: 41.9 FL (ref 36.4–46.3)
SODIUM SERPL-SCNC: 142 MMOL/L (ref 136–145)
WBC # BLD AUTO: 17.75 K/UL (ref 4.8–10.8)

## 2018-03-26 RX ADMIN — NYSTATIN SCH ML: 100000 SUSPENSION ORAL at 20:42

## 2018-03-26 RX ADMIN — Medication SCH MEQ: at 09:57

## 2018-03-26 RX ADMIN — NYSTATIN SCH ML: 100000 SUSPENSION ORAL at 09:56

## 2018-03-26 RX ADMIN — APIXABAN SCH MG: 2.5 TABLET, FILM COATED ORAL at 20:42

## 2018-03-26 RX ADMIN — METRONIDAZOLE SCH MLS/HR: 500 INJECTION, SOLUTION INTRAVENOUS at 20:42

## 2018-03-26 RX ADMIN — METOPROLOL TARTRATE SCH MG: 50 TABLET, FILM COATED ORAL at 14:13

## 2018-03-26 RX ADMIN — METOPROLOL TARTRATE PRN MG: 5 INJECTION, SOLUTION INTRAVENOUS at 05:51

## 2018-03-26 RX ADMIN — Medication SCH ML: at 10:04

## 2018-03-26 RX ADMIN — DEXTROSE AND SODIUM CHLORIDE SCH MLS/HR: 5; .45 INJECTION, SOLUTION INTRAVENOUS at 10:21

## 2018-03-26 RX ADMIN — DEXTROSE AND SODIUM CHLORIDE SCH MLS/HR: 5; .45 INJECTION, SOLUTION INTRAVENOUS at 23:24

## 2018-03-26 RX ADMIN — PANTOPRAZOLE SODIUM SCH MLS/MIN: 40 INJECTION, POWDER, FOR SOLUTION INTRAVENOUS at 11:02

## 2018-03-26 RX ADMIN — METOPROLOL TARTRATE SCH MG: 50 TABLET, FILM COATED ORAL at 23:24

## 2018-03-26 RX ADMIN — METOPROLOL TARTRATE SCH MG: 50 TABLET, FILM COATED ORAL at 17:40

## 2018-03-26 RX ADMIN — NITROGLYCERIN SCH INCH: 20 OINTMENT TOPICAL at 11:03

## 2018-03-26 RX ADMIN — Medication SCH ML: at 14:13

## 2018-03-26 RX ADMIN — VANCOMYCIN HYDROCHLORIDE SCH MG: 1 INJECTION, POWDER, LYOPHILIZED, FOR SOLUTION INTRAVENOUS at 17:40

## 2018-03-26 RX ADMIN — VANCOMYCIN HYDROCHLORIDE SCH MG: 1 INJECTION, POWDER, LYOPHILIZED, FOR SOLUTION INTRAVENOUS at 14:13

## 2018-03-26 RX ADMIN — CEFTRIAXONE SODIUM SCH MLS/HR: 1 INJECTION, POWDER, FOR SOLUTION INTRAVENOUS at 17:39

## 2018-03-26 RX ADMIN — Medication SCH MEQ: at 20:41

## 2018-03-26 RX ADMIN — METRONIDAZOLE SCH MLS/HR: 500 INJECTION, SOLUTION INTRAVENOUS at 05:50

## 2018-03-26 RX ADMIN — NITROGLYCERIN SCH INCH: 20 OINTMENT TOPICAL at 20:55

## 2018-03-26 RX ADMIN — Medication SCH ML: at 17:40

## 2018-03-26 RX ADMIN — HYDROMORPHONE HYDROCHLORIDE PRN MG: 1 INJECTION, SOLUTION INTRAMUSCULAR; INTRAVENOUS; SUBCUTANEOUS at 11:24

## 2018-03-26 RX ADMIN — NYSTATIN SCH ML: 100000 SUSPENSION ORAL at 14:13

## 2018-03-26 RX ADMIN — VANCOMYCIN HYDROCHLORIDE SCH MG: 1 INJECTION, POWDER, LYOPHILIZED, FOR SOLUTION INTRAVENOUS at 09:56

## 2018-03-26 RX ADMIN — Medication SCH ML: at 20:41

## 2018-03-26 RX ADMIN — Medication SCH ML: at 09:56

## 2018-03-26 RX ADMIN — NITROGLYCERIN SCH INCH: 20 OINTMENT TOPICAL at 17:39

## 2018-03-26 RX ADMIN — NYSTATIN SCH ML: 100000 SUSPENSION ORAL at 17:39

## 2018-03-26 RX ADMIN — APIXABAN SCH MG: 2.5 TABLET, FILM COATED ORAL at 05:51

## 2018-03-26 RX ADMIN — METRONIDAZOLE SCH MLS/HR: 500 INJECTION, SOLUTION INTRAVENOUS at 14:13

## 2018-03-26 RX ADMIN — VANCOMYCIN HYDROCHLORIDE SCH MG: 1 INJECTION, POWDER, LYOPHILIZED, FOR SOLUTION INTRAVENOUS at 20:55

## 2018-03-26 NOTE — SURGERY PROGRESS NOTE
Surgery Progress Note


Date of Service


Mar 26, 2018.





Subjective


Post OP Day:  HD # 6


sleeping on encounter, more alert compared to Friday however still pleasantly 

confused. 


When asked about abdominal pain slowly nods no


However Unable to obtain complete ROS





Objective


Vital Signs:











  Date Time  Temp Pulse Resp B/P (MAP) Pulse Ox O2 Delivery O2 Flow Rate FiO2


 


3/26/18 11:38    140/83 (102)    


 


3/26/18 08:00      Nasal Cannula 2.0 


 


3/26/18 07:00 36.4 84 28 174/114 (134) 95 Nasal Cannula 2.0 


 


3/26/18 05:51  87  171/96    


 


3/26/18 04:53    167/99 (121)    


 


3/26/18 04:05      Nasal Cannula 2.0 


 


3/26/18 02:50 36.7 83 26 205/127 (153) 98   





    201/145 (163)    


 


3/26/18 00:06      Nasal Cannula 2.0 


 


3/25/18 23:58 36.7 69 26 158/93 (114) 91   


 


3/25/18 20:24      Nasal Cannula 2.0 


 


3/25/18 19:04 36.8 74 22 169/95 (119) 96 Nasal Cannula 2.5 


 


3/25/18 16:02 36.7 72 26 126/79 (95) 97 Nasal Cannula 2.0 


 


3/25/18 16:00      Nasal Cannula 2.0 


 


3/25/18 15:01  70 25 150/79 (102) 95 Nasal Cannula 2.0 


 


3/25/18 14:01  68 23 136/81 (99) 96 Nasal Cannula 2.0 


 


3/25/18 13:01  67 15 136/78 (97) 96   








General Appearance:  WD/WN, no apparent distress


Head:  normocephalic, atraumatic, + pertinent finding (TF via NGT)


Neck:  trachea midline


Respiratory/Chest:  no respiratory distress, no accessory muscle use, + 

decreased breath sounds


Cardiovascular:  regular rate, rhythm, no murmur


Abdomen:  soft, no organomegaly, + distended (mild distention), + tenderness


Laboratory Results:





Results Past 24 Hours








Test


  3/26/18


05:19 3/26/18


05:23 Range/Units


 


 


Activated Partial


Thromboplast Time 35.4


  


  21.0-31.0


SECONDS


 


Partial Thromboplastin Ratio 1.4   


 


White Blood Count  17.75 4.8-10.8  K/uL


 


Red Blood Count  4.24 4.2-5.4  M/uL


 


Hemoglobin  11.8 12.0-16.0  g/dL


 


Hematocrit  35.3 37-47  %


 


Mean Corpuscular Volume  83.3   fL


 


Mean Corpuscular Hemoglobin  27.8 25-34  pg


 


Mean Corpuscular Hemoglobin


Concent 


  33.4


  32-36  g/dl


 


 


RDW Standard Deviation  41.9 36.4-46.3  fL


 


RDW Coefficient of Variation  13.9 11.5-14.5  %


 


Platelet Count  364 130-400  K/uL


 


Mean Platelet Volume  11.2 7.4-10.4  fL


 


Nucleated RBC Absolute Count


(auto) 


  0.07


  0-0  K/uL


 


 


Nucleated Red Blood Cells %  0.4  %


 


Sodium Level  142 136-145  mmol/L


 


Potassium Level  3.6 3.5-5.1  mmol/L


 


Chloride Level  109   mmol/L


 


Carbon Dioxide Level  27 21-32  mmol/L


 


Anion Gap  7.0 3-11  mmol/L


 


Blood Urea Nitrogen  32 7-18  mg/dl


 


Creatinine


  


  0.81


  0.60-1.20


mg/dl


 


Est Creatinine Clear Calc


Drug Dose 


  61.0


   ml/min


 


 


Estimated GFR (


American) 


  91.5


   


 


 


Estimated GFR (Non-


American 


  78.9


   


 


 


BUN/Creatinine Ratio  39.8 10-20  


 


Random Glucose  242 70-99  mg/dl


 


Calcium Level  8.4 8.5-10.1  mg/dl











Assessment & Plan


C. diff colitis


   - afebrile


   - leukocytosis stable (same as yesterday 17K)


   - abdominal distention and tenderness


   - more alert but still confused, lethargic





Plan:


No acute surgical intervention required


Continue Oral Vancomycin


Continue IV antibiotics


Continue current medical ICU management


Repeat am labs





Dr. Anand has seen and examined patient, agrees with above

## 2018-03-26 NOTE — [facility] POST OPERATIVE BRIEF NOTE
Preliminary Procedure Note


Procedure Date


Mar 26, 2018.





Pre-Procedure Diagnosis


Non STEMI





AUC Score


8





Post-Procedure Diagnosis


Severe CAD (Single vessel), Decreased LV Systolic Function





Procedure(s) Performed


Coronary Angiography, Left Heart Cath, LV Angiography





Cardiologist


Dr. Palmer Nelson





Assistant(s)


None (Savannah Timmons)





Estimated Blood Loss


<15cc





Medication(s)


Fentanyl (12.5 mcg IV), Heparin (2500 u IV), Versed (1mg), Lidocaine 1% (local 

infiltration)





Preliminary Findings


Right dominant coronary anatomy


LM normal length and caliber


LAD   Large Type 3 with large bifurcating diagonal.  Eccentric 40% proximal 

stenosis


LCX Single large OM reaching to apex, 80% proximal lesion


RCA Very large dominant with large long PDA and PV  mild irreg diffusely


LV Basal akinesis with diffuse hypokinesis, EF 30%


EDP20





Recommendations


PCI without planned CABG





Specimens


None





Fluids (cc crystalloids)


50





Anesthesia


Start 1107  End 1131 Emma Benson RN





Procedural Complication(s)


None





Disposition

## 2018-03-26 NOTE — ECHOCARDIOGRAM REPORT
*NOTICE TO RECEIVING PARTY AGENCY**  This information is strictly Confidential and protected under 
[place] law.  [place] law prohibits you from making any further disclosure of this 
information unless further disclosure is expressly permitted by the written consent of the person to 
whom it pertains or is authorized by law.  A general authorization for the release of medical or 
other information is not sufficient for this purpose.  Hospital accepts no responsibility if the 
information is made available to any other person, INCLUDING THE PATIENT.



Interpretation Summary

  *  Name: KATHERIN DAVID  Study Date: 2018 06:23 AM  BP: 171/96 mmHg

  *  MRN: [id number]  Patient Location: C.2E\S\E201\S\1  HR: 87

  *  : 1955 (M/d/yyyy)  Gender: Female  Height: 64 in

  *  Age: 62 yrs  Ethnicity: AA  Weight: 116 lb

  *  Ordering Physician: Britt Sommers

  *  Referring Physician: Self, Referred

  *  Performed By: MATTHEW Malagon RCS

  *  Accession# [id number]  Account# [id number]

  *  Reason For Study: Pulmonary HTN

  *  BSA: 1.6 m2

  *  -- Conclusions --

  *  The left ventricle is normal in size.

  *  There is mild concentric left ventricular hypertrophy.

  *  There is mild global hypokinesis of the left ventricle.

  *  Ejection Fraction = 40-45%.

  *  The left atrium is mildly dilated.

  *  The right atrium is severely dilated.

  *  The right ventricular systolic function is mildly reduced.

  *  There is trace mitral regurgitation.

  *  There is moderate tricuspid regurgitation.

  *  Right ventricular systolic pressure is elevated at 50-60mmHg.

  *  Moderate size left pleural effusion.

  *  In comparison to prior, overal LV and RV systolic function has improved

Procedure Details

  *  A complete two-dimensional transthoracic echocardiogram was performed (2D, M-mode, Doppler and 
color flow Doppler).

Left Ventricle

  *  The left ventricle is normal in size.

  *  There is mild concentric left ventricular hypertrophy.

  *  Ejection Fraction = 40-45%.

  *  There is mild global hypokinesis of the left ventricle.

Right Ventricle

  *  The right ventricle is mildly dilated.

  *  The right ventricular systolic function is mildly reduced.

Atria

  *  The left atrium is mildly dilated.

  *  The right atrium is severely dilated.

  *  No ASD detected; PFO is not assessed.

Mitral Valve

  *  The mitral valve anatomy is normal.

  *  There is no mitral valve stenosis.

  *  There is trace mitral regurgitation.

Tricuspid Valve

  *  The tricuspid valve anatomy is normal.

  *  There is no tricuspid stenosis.

  *  There is moderate tricuspid regurgitation.

  *  Right ventricular systolic pressure is elevated at 50-60mmHg.

Aortic Valve

  *  The aortic valve is trileaflet.

  *  No hemodynamically significant valvular aortic stenosis.

  *  No aortic regurgitation is present.

Pulmonic Valve

  *  The pulmonic valve is not well visualized.

Great Vessels

  *  The aortic root is normal size.

Pericardium/Pleural

  *  There is a trivial pericardial effusion.

  *  Moderate size left pleural effusion.

Great Vessels

  *  Normal inferior vena cava diameter and respiratory variation suggests normal central venous 
pressure.



MMode 2D Measurements and Calculations

IVSd 1.1 cm

IVSs 1.3 cm



LVIDd 3.7 cm

LVIDs 2.9 cm

LVPWd 1.5 cm

LVPWs 2.8 cm



IVS/LVPW 0.77 

FS 23.1 %

EDV(Teich) 59.4 ml

ESV(Teich) 31.4 ml

EF(Teich) 47.2 %



EDV(cubed) 52.0 ml

ESV(cubed) 23.6 ml

EF(cubed) 54.6 %

% IVS thick 19.0 %

% LVPW thick 94.8 %





LV mass(C)d 166.6 grams

LV mass(C)dI 107.3 grams/m\S\2

LV mass(C)s 271.6 grams

LV mass(C)sI 175.0 grams/m\S\2



SV(Teich) 28.0 ml

SI(Teich) 18.1 ml/m\S\2

SV(cubed) 28.4 ml

SI(cubed) 18.3 ml/m\S\2



Ao root diam 3.1 cm

Ao root area 7.3 cm\S\2

ACS 1.7 cm



EDV(MOD-sp4) 69.0 ml

ESV(MOD-sp4) 37.0 ml

EF(MOD-sp4) 46.4 %





EDV(MOD-sp2) 69.0 ml

ESV(MOD-sp2) 43.0 ml

EF(MOD-sp2) 37.7 %



SV(MOD-sp4) 32.0 ml

SI(MOD-sp4) 20.6 ml/m\S\2



SV(MOD-sp2) 26.0 ml

SI(MOD-sp2) 16.8 ml/m\S\2









Doppler Measurements and Calculations

MV E max roxane 122.4 cm/sec

MV A max roxane 67.4 cm/sec



MV E/A 1.8 



MV dec time 0.16 sec



Ao V2 max 151.4 cm/sec

Ao max PG 9.2 mmHg

Ao max PG (full) 3.1 mmHg





LV V1 max PG 6.1 mmHg



LV V1 max 123.5 cm/sec



PA V2 max 88.6 cm/sec

PA max PG 3.2 mmHg



PI max roxane 273.4 cm/sec

PI max PG 29.9 mmHg

PI dec slope 507.3 cm/sec\S\2

PI P1/2t 157.8 msec





TR max roxane 305.6 cm/sec

## 2018-03-27 VITALS — DIASTOLIC BLOOD PRESSURE: 100 MMHG | SYSTOLIC BLOOD PRESSURE: 179 MMHG | HEART RATE: 70 BPM

## 2018-03-27 VITALS
HEART RATE: 62 BPM | TEMPERATURE: 97.88 F | OXYGEN SATURATION: 98 % | SYSTOLIC BLOOD PRESSURE: 146 MMHG | DIASTOLIC BLOOD PRESSURE: 87 MMHG

## 2018-03-27 VITALS
SYSTOLIC BLOOD PRESSURE: 182 MMHG | HEART RATE: 63 BPM | OXYGEN SATURATION: 94 % | TEMPERATURE: 97.88 F | DIASTOLIC BLOOD PRESSURE: 106 MMHG

## 2018-03-27 VITALS
OXYGEN SATURATION: 100 % | TEMPERATURE: 97.52 F | HEART RATE: 63 BPM | SYSTOLIC BLOOD PRESSURE: 172 MMHG | DIASTOLIC BLOOD PRESSURE: 93 MMHG

## 2018-03-27 VITALS — DIASTOLIC BLOOD PRESSURE: 114 MMHG | HEART RATE: 78 BPM | SYSTOLIC BLOOD PRESSURE: 191 MMHG

## 2018-03-27 VITALS
SYSTOLIC BLOOD PRESSURE: 175 MMHG | DIASTOLIC BLOOD PRESSURE: 101 MMHG | TEMPERATURE: 97.88 F | HEART RATE: 67 BPM | OXYGEN SATURATION: 94 %

## 2018-03-27 VITALS
DIASTOLIC BLOOD PRESSURE: 95 MMHG | TEMPERATURE: 97.52 F | HEART RATE: 68 BPM | SYSTOLIC BLOOD PRESSURE: 175 MMHG | OXYGEN SATURATION: 95 %

## 2018-03-27 VITALS — SYSTOLIC BLOOD PRESSURE: 146 MMHG | DIASTOLIC BLOOD PRESSURE: 84 MMHG | OXYGEN SATURATION: 95 % | HEART RATE: 60 BPM

## 2018-03-27 VITALS — SYSTOLIC BLOOD PRESSURE: 168 MMHG | DIASTOLIC BLOOD PRESSURE: 95 MMHG | HEART RATE: 74 BPM

## 2018-03-27 VITALS
DIASTOLIC BLOOD PRESSURE: 117 MMHG | OXYGEN SATURATION: 98 % | TEMPERATURE: 98.42 F | HEART RATE: 74 BPM | SYSTOLIC BLOOD PRESSURE: 194 MMHG

## 2018-03-27 LAB
BASOPHILS # BLD: 0.03 K/UL (ref 0–0.2)
BASOPHILS NFR BLD: 0.1 %
BUN SERPL-MCNC: 34 MG/DL (ref 7–18)
CALCIUM SERPL-MCNC: 8.6 MG/DL (ref 8.5–10.1)
CO2 SERPL-SCNC: 26 MMOL/L (ref 21–32)
CREAT SERPL-MCNC: 0.8 MG/DL (ref 0.6–1.2)
ENA SS-A IGG SER QL IA: (no result) AI
ENA SS-B AB SER-ACNC: (no result) AI
EOS ABS #: 0.25 K/UL (ref 0–0.5)
EOSINOPHIL NFR BLD AUTO: 444 K/UL (ref 130–400)
EOSINOPHIL NFR BLD AUTO: 533 K/UL (ref 130–400)
GLUCOSE SERPL-MCNC: 292 MG/DL (ref 70–99)
HCT VFR BLD CALC: 35.9 % (ref 37–47)
HCT VFR BLD CALC: 37 % (ref 37–47)
HGB BLD-MCNC: 11.7 G/DL (ref 12–16)
HGB BLD-MCNC: 12.3 G/DL (ref 12–16)
IG#: 0.77 K/UL (ref 0–0.02)
IMM GRANULOCYTES NFR BLD AUTO: 14.8 %
INR PPP: 1.4 (ref 0.9–1.1)
LYMPHOCYTES # BLD: 3.03 K/UL (ref 1.2–3.4)
MCH RBC QN AUTO: 27.7 PG (ref 25–34)
MCH RBC QN AUTO: 27.9 PG (ref 25–34)
MCHC RBC AUTO-ENTMCNC: 32.6 G/DL (ref 32–36)
MCHC RBC AUTO-ENTMCNC: 33.2 G/DL (ref 32–36)
MCV RBC AUTO: 83.9 FL (ref 80–100)
MCV RBC AUTO: 84.9 FL (ref 80–100)
MONO ABS #: 1.67 K/UL (ref 0.11–0.59)
MONOCYTES NFR BLD: 8.2 %
NEUT ABS #: 14.66 K/UL (ref 1.4–6.5)
NEUTROPHILS # BLD AUTO: 1.2 %
NEUTROPHILS NFR BLD AUTO: 71.9 %
PHOSPHATE SERPL-MCNC: 2.6 MG/DL (ref 2.5–4.9)
PMV BLD AUTO: 10.3 FL (ref 7.4–10.4)
PMV BLD AUTO: 10.9 FL (ref 7.4–10.4)
POTASSIUM SERPL-SCNC: 5 MMOL/L (ref 3.5–5.1)
PTT PATIENT: 35.5 SECONDS (ref 21–31)
PTT PATIENT: 36.4 SECONDS (ref 21–31)
RED CELL DISTRIBUTION WIDTH CV: 14.2 % (ref 11.5–14.5)
RED CELL DISTRIBUTION WIDTH CV: 14.3 % (ref 11.5–14.5)
RED CELL DISTRIBUTION WIDTH SD: 43.4 FL (ref 36.4–46.3)
RED CELL DISTRIBUTION WIDTH SD: 43.7 FL (ref 36.4–46.3)
SODIUM SERPL-SCNC: 141 MMOL/L (ref 136–145)
WBC # BLD AUTO: 19.6 K/UL (ref 4.8–10.8)
WBC # BLD AUTO: 20.41 K/UL (ref 4.8–10.8)

## 2018-03-27 RX ADMIN — HYDROMORPHONE HYDROCHLORIDE PRN MG: 1 INJECTION, SOLUTION INTRAMUSCULAR; INTRAVENOUS; SUBCUTANEOUS at 01:49

## 2018-03-27 RX ADMIN — METOPROLOL TARTRATE SCH MG: 50 TABLET, FILM COATED ORAL at 18:00

## 2018-03-27 RX ADMIN — METOPROLOL TARTRATE SCH MG: 50 TABLET, FILM COATED ORAL at 11:28

## 2018-03-27 RX ADMIN — METOPROLOL TARTRATE SCH MG: 5 INJECTION, SOLUTION INTRAVENOUS at 23:45

## 2018-03-27 RX ADMIN — Medication SCH ML: at 20:16

## 2018-03-27 RX ADMIN — CEFTRIAXONE SODIUM SCH MLS/HR: 1 INJECTION, POWDER, FOR SOLUTION INTRAVENOUS at 17:24

## 2018-03-27 RX ADMIN — VANCOMYCIN HYDROCHLORIDE SCH MG: 1 INJECTION, POWDER, LYOPHILIZED, FOR SOLUTION INTRAVENOUS at 13:42

## 2018-03-27 RX ADMIN — NITROGLYCERIN SCH INCH: 20 OINTMENT TOPICAL at 10:05

## 2018-03-27 RX ADMIN — Medication SCH ML: at 08:03

## 2018-03-27 RX ADMIN — NYSTATIN SCH ML: 100000 SUSPENSION ORAL at 17:00

## 2018-03-27 RX ADMIN — INSULIN ASPART SCH UNITS: 100 INJECTION, SOLUTION INTRAVENOUS; SUBCUTANEOUS at 20:00

## 2018-03-27 RX ADMIN — HYDROMORPHONE HYDROCHLORIDE PRN MG: 1 INJECTION, SOLUTION INTRAMUSCULAR; INTRAVENOUS; SUBCUTANEOUS at 07:56

## 2018-03-27 RX ADMIN — PANTOPRAZOLE SODIUM SCH MLS/MIN: 40 INJECTION, POWDER, FOR SOLUTION INTRAVENOUS at 08:01

## 2018-03-27 RX ADMIN — METRONIDAZOLE SCH MLS/HR: 500 INJECTION, SOLUTION INTRAVENOUS at 06:05

## 2018-03-27 RX ADMIN — Medication SCH ML: at 08:09

## 2018-03-27 RX ADMIN — VANCOMYCIN HYDROCHLORIDE SCH MG: 1 INJECTION, POWDER, LYOPHILIZED, FOR SOLUTION INTRAVENOUS at 20:16

## 2018-03-27 RX ADMIN — Medication SCH ML: at 17:38

## 2018-03-27 RX ADMIN — LORAZEPAM PRN MG: 2 INJECTION INTRAMUSCULAR; INTRAVENOUS at 15:22

## 2018-03-27 RX ADMIN — METRONIDAZOLE SCH MLS/HR: 500 INJECTION, SOLUTION INTRAVENOUS at 13:13

## 2018-03-27 RX ADMIN — NYSTATIN SCH ML: 100000 SUSPENSION ORAL at 08:02

## 2018-03-27 RX ADMIN — NITROGLYCERIN SCH INCH: 20 OINTMENT TOPICAL at 20:43

## 2018-03-27 RX ADMIN — INSULIN ASPART SCH UNITS: 100 INJECTION, SOLUTION INTRAVENOUS; SUBCUTANEOUS at 10:36

## 2018-03-27 RX ADMIN — VANCOMYCIN HYDROCHLORIDE SCH MG: 1 INJECTION, POWDER, LYOPHILIZED, FOR SOLUTION INTRAVENOUS at 17:38

## 2018-03-27 RX ADMIN — APIXABAN SCH MG: 2.5 TABLET, FILM COATED ORAL at 08:00

## 2018-03-27 RX ADMIN — Medication SCH ML: at 08:02

## 2018-03-27 RX ADMIN — METOPROLOL TARTRATE SCH MG: 50 TABLET, FILM COATED ORAL at 06:05

## 2018-03-27 RX ADMIN — METRONIDAZOLE SCH MLS/HR: 500 INJECTION, SOLUTION INTRAVENOUS at 20:43

## 2018-03-27 RX ADMIN — LORAZEPAM PRN MG: 2 INJECTION INTRAMUSCULAR; INTRAVENOUS at 12:02

## 2018-03-27 RX ADMIN — INSULIN ASPART SCH UNITS: 100 INJECTION, SOLUTION INTRAVENOUS; SUBCUTANEOUS at 16:00

## 2018-03-27 RX ADMIN — VANCOMYCIN HYDROCHLORIDE SCH MG: 1 INJECTION, POWDER, LYOPHILIZED, FOR SOLUTION INTRAVENOUS at 08:04

## 2018-03-27 RX ADMIN — NITROGLYCERIN SCH INCH: 20 OINTMENT TOPICAL at 15:24

## 2018-03-27 RX ADMIN — Medication SCH ML: at 13:13

## 2018-03-27 RX ADMIN — Medication SCH MEQ: at 08:00

## 2018-03-27 RX ADMIN — INSULIN ASPART SCH UNITS: 100 INJECTION, SOLUTION INTRAVENOUS; SUBCUTANEOUS at 23:53

## 2018-03-27 RX ADMIN — HEPARIN SODIUM SCH MLS/HR: 5000 INJECTION, SOLUTION INTRAVENOUS at 23:53

## 2018-03-27 RX ADMIN — NYSTATIN SCH ML: 100000 SUSPENSION ORAL at 20:16

## 2018-03-27 RX ADMIN — NYSTATIN SCH ML: 100000 SUSPENSION ORAL at 13:13

## 2018-03-27 RX ADMIN — INSULIN ASPART SCH UNITS: 100 INJECTION, SOLUTION INTRAVENOUS; SUBCUTANEOUS at 13:38

## 2018-03-27 RX ADMIN — NITROGLYCERIN SCH INCH: 20 OINTMENT TOPICAL at 03:15

## 2018-03-27 NOTE — DIAGNOSTIC IMAGING REPORT
CHEST ONE VIEW PORTABLE



CLINICAL HISTORY: Dyspnea.    



COMPARISON STUDY:  Chest CT March 20, 2018.



FINDINGS: Tip of nasogastric tube is below the lower aspect of this image but at

least within the distal body of the stomach. There is no pneumothorax. There are

moderate to large bilateral pleural effusions with associated bibasilar

opacities. There is pulmonary vascular congestion with suspected pulmonary

edema. Cardiomegaly is noted.



IMPRESSION:  



1. Moderate to large bilateral pleural fusions with associated bibasilar

opacities which favor atelectasis. Consolidation could appear similar.



2. Pulmonary vascular congestion with mild pulmonary edema.









Electronically signed by:  Brian Ladd M.D.

3/27/2018 11:19 AM



Dictated Date/Time:  3/27/2018 11:17 AM

## 2018-03-27 NOTE — SURGERY PROGRESS NOTE
Surgery Progress Note


Date of Service


Mar 27, 2018.





Subjective


Post OP Day:  HD # 7


Patient more alert today, when asked about any chest pain, palpitations, 

abdominal pain, nausea or vomiting patient states no.


Per nursing staff her systolic blood pressure has been in the 170's-180's 

overnight now improved in the 140-150's after morning medications. 


She had loose bowel movements yesterday, no blood. 





She seems to be more short of breath with accessory muscle use and abdominal 

breathing this morning compared to yesterday.





Objective


Vital Signs:











  Date Time  Temp Pulse Resp B/P (MAP) Pulse Ox O2 Delivery O2 Flow Rate FiO2


 


3/27/18 08:47  60 22 146/84 (104) 95 Nasal Cannula 25.0 


 


3/27/18 08:00      Nasal Cannula 2.0 


 


3/27/18 07:26 36.6 63 28 182/106 (131) 94 Nasal Cannula 2.0 


 


3/27/18 04:08  74  168/95 (119)    


 


3/27/18 04:00      Nasal Cannula 1.0 


 


3/27/18 03:44  70  179/100 (126)    


 


3/27/18 03:04 36.6 67 27 175/101 (125) 94 Nasal Cannula 1.0 


 


3/26/18 23:59     96 Nasal Cannula 1.0 


 


3/26/18 23:45 36.3 68 28 176/94 (121) 95 Nasal Cannula 1.0 


 


3/26/18 20:00      Nasal Cannula 1.0 


 


3/26/18 19:48 36.6 66 24 149/85 (106) 95  2.0 


 


3/26/18 16:00      Nasal Cannula 2.0 


 


3/26/18 15:30 36.7 66 22 131/82 (98) 96  2.0 


 


3/26/18 12:00      Nasal Cannula 2.0 


 


3/26/18 12:00 36.4 72 24  95 Nasal Cannula 2.0 








General Appearance:  WD/WN, + mild distress


Head:  normocephalic, atraumatic, + pertinent finding (NGT with Tube feeds)


Neck:  trachea midline


Respiratory/Chest:  + accessory muscle use


Abdomen:  non tender, soft, no organomegaly, no pulsatile mass, + abnormal 

bowel sounds (hypoactive bowel sounds), + distended (mild distention)


Laboratory Results:





Results Past 24 Hours








Test


  3/27/18


06:58 3/27/18


10:03 Range/Units


 


 


White Blood Count 19.60  4.8-10.8  K/uL


 


Red Blood Count 4.23  4.2-5.4  M/uL


 


Hemoglobin 11.7  12.0-16.0  g/dL


 


Hematocrit 35.9  37-47  %


 


Mean Corpuscular Volume 84.9    fL


 


Mean Corpuscular Hemoglobin 27.7  25-34  pg


 


Mean Corpuscular Hemoglobin


Concent 32.6


  


  32-36  g/dl


 


 


RDW Standard Deviation 43.7  36.4-46.3  fL


 


RDW Coefficient of Variation 14.2  11.5-14.5  %


 


Platelet Count 444  130-400  K/uL


 


Mean Platelet Volume 10.9  7.4-10.4  fL


 


Activated Partial


Thromboplast Time 35.5


  


  21.0-31.0


SECONDS


 


Partial Thromboplastin Ratio 1.4   


 


Sodium Level 141  136-145  mmol/L


 


Potassium Level 5.0  3.5-5.1  mmol/L


 


Chloride Level 109    mmol/L


 


Carbon Dioxide Level 26  21-32  mmol/L


 


Anion Gap 6.0  3-11  mmol/L


 


Blood Urea Nitrogen 34  7-18  mg/dl


 


Creatinine


  0.80


  


  0.60-1.20


mg/dl


 


Est Creatinine Clear Calc


Drug Dose 64.6


  


   ml/min


 


 


Estimated GFR (


American) 92.9


  


   


 


 


Estimated GFR (Non-


American 80.1


  


   


 


 


BUN/Creatinine Ratio 42.1  10-20  


 


Random Glucose 292  70-99  mg/dl


 


Calcium Level 8.6  8.5-10.1  mg/dl


 


Phosphorus Level 2.6  2.5-4.9  mg/dl


 


Magnesium Level 2.0  1.8-2.4  mg/dl


 


Bedside Glucose  296 70-90  mg/dl











Assessment & Plan


C. diff colitis


   - afebrile


   - leukocytosis increased to 19K (yesterday 17K)


   - abdominal distention improved, no tenderness on examination, abdominal 

muscle use with breathing today


   - more alert today on examination


   - + bowel movements, no blood, H&H stable





Plan:


Given increase in leukocytosis will need to closely follow, repeat labs 

tomorrow. if increase in WBC will need to repeat CT scan of abdomen and pelvis.

  Abdomen soft, patient denies of any abdominal pain and no grimacing on exam. 

She currently looks short of breath with accessory muscle use and abdominal 

muscle use for breathing. (cardiology plan for CXR and one time dose of Lasix)


Continue Oral Vancomycin


Continue IV antibiotics


Continue current medical ICU management


Repeat am labs





Dr. Anand has seen and examined patient, agrees with above

## 2018-03-27 NOTE — DIAGNOSTIC IMAGING REPORT
CT HEAD WITHOUT CONTRAST (CT)



CLINICAL HISTORY: Stroke    



COMPARISON STUDY:  3/20/2018, MRI the brain dated 3/21/2018



TECHNIQUE:  Axial CT of the brain is performed from the vertex to the skull

base. IV contrast was not administered for this examination. A dose lowering

technique was utilized adhering to the principles of ALARA.

 



CT DOSE: 638.56 mGycm



FINDINGS:



No intra or extra-axial mass lesions are visualized. There is a hypodensity

present within the right cerebellum/inferior aspect of the right middle cerebral

peduncle. This may represent a subacute infarct. There is no midline shift.

There is no acute hemorrhage.

   

There is no evidence of pathologic ventricular dilatation.

There is no evidence of acute sinusitis



IMPRESSION: 

1. 14 mm focal hypodensity in the region of the right cerebellum/inferior aspect

of the right middle cerebellar peduncle. This may represent a subacute infarct.

Short-term follow-up imaging is recommended, given the nonspecificity of this

finding.

2. No evidence of acute hemorrhage. No evidence of hydrocephalus.







Electronically signed by:  Rick Benavidez M.D.

3/27/2018 5:13 PM



Dictated Date/Time:  3/27/2018 5:07 PM

## 2018-03-28 VITALS
DIASTOLIC BLOOD PRESSURE: 112 MMHG | HEART RATE: 75 BPM | TEMPERATURE: 98.42 F | SYSTOLIC BLOOD PRESSURE: 179 MMHG | OXYGEN SATURATION: 99 %

## 2018-03-28 VITALS — DIASTOLIC BLOOD PRESSURE: 99 MMHG | SYSTOLIC BLOOD PRESSURE: 188 MMHG | HEART RATE: 70 BPM

## 2018-03-28 VITALS — SYSTOLIC BLOOD PRESSURE: 212 MMHG | OXYGEN SATURATION: 94 % | DIASTOLIC BLOOD PRESSURE: 111 MMHG

## 2018-03-28 VITALS
TEMPERATURE: 98.24 F | SYSTOLIC BLOOD PRESSURE: 164 MMHG | HEART RATE: 87 BPM | OXYGEN SATURATION: 95 % | DIASTOLIC BLOOD PRESSURE: 92 MMHG

## 2018-03-28 VITALS
DIASTOLIC BLOOD PRESSURE: 79 MMHG | HEART RATE: 78 BPM | TEMPERATURE: 98.42 F | OXYGEN SATURATION: 95 % | SYSTOLIC BLOOD PRESSURE: 154 MMHG

## 2018-03-28 VITALS
HEART RATE: 87 BPM | SYSTOLIC BLOOD PRESSURE: 146 MMHG | TEMPERATURE: 98.06 F | DIASTOLIC BLOOD PRESSURE: 70 MMHG | OXYGEN SATURATION: 96 %

## 2018-03-28 VITALS — DIASTOLIC BLOOD PRESSURE: 87 MMHG | HEART RATE: 75 BPM | SYSTOLIC BLOOD PRESSURE: 162 MMHG | OXYGEN SATURATION: 97 %

## 2018-03-28 VITALS
HEART RATE: 83 BPM | TEMPERATURE: 98.6 F | SYSTOLIC BLOOD PRESSURE: 165 MMHG | DIASTOLIC BLOOD PRESSURE: 90 MMHG | OXYGEN SATURATION: 96 %

## 2018-03-28 VITALS — HEART RATE: 77 BPM | DIASTOLIC BLOOD PRESSURE: 85 MMHG | SYSTOLIC BLOOD PRESSURE: 159 MMHG

## 2018-03-28 VITALS — HEART RATE: 75 BPM | OXYGEN SATURATION: 100 %

## 2018-03-28 LAB
BUN SERPL-MCNC: 25 MG/DL (ref 7–18)
BUN SERPL-MCNC: 34 MG/DL (ref 7–18)
CALCIUM SERPL-MCNC: 8.4 MG/DL (ref 8.5–10.1)
CALCIUM SERPL-MCNC: 9 MG/DL (ref 8.5–10.1)
CO2 SERPL-SCNC: 26 MMOL/L (ref 21–32)
CO2 SERPL-SCNC: 28 MMOL/L (ref 21–32)
CREAT SERPL-MCNC: 0.72 MG/DL (ref 0.6–1.2)
CREAT SERPL-MCNC: 0.78 MG/DL (ref 0.6–1.2)
EOSINOPHIL NFR BLD AUTO: 565 K/UL (ref 130–400)
GLUCOSE SERPL-MCNC: 104 MG/DL (ref 70–99)
GLUCOSE SERPL-MCNC: 81 MG/DL (ref 70–99)
HBA1C MFR BLD: 6.5 % (ref 4.5–5.6)
HCT VFR BLD CALC: 36.7 % (ref 37–47)
HGB BLD-MCNC: 12.2 G/DL (ref 12–16)
MCH RBC QN AUTO: 27.9 PG (ref 25–34)
MCHC RBC AUTO-ENTMCNC: 33.2 G/DL (ref 32–36)
MCV RBC AUTO: 83.8 FL (ref 80–100)
PMV BLD AUTO: 10.6 FL (ref 7.4–10.4)
POTASSIUM SERPL-SCNC: 4.2 MMOL/L (ref 3.5–5.1)
POTASSIUM SERPL-SCNC: 4.5 MMOL/L (ref 3.5–5.1)
PTT PATIENT: 68.9 SECONDS (ref 21–31)
RED CELL DISTRIBUTION WIDTH CV: 14.3 % (ref 11.5–14.5)
RED CELL DISTRIBUTION WIDTH SD: 42.5 FL (ref 36.4–46.3)
SODIUM SERPL-SCNC: 142 MMOL/L (ref 136–145)
SODIUM SERPL-SCNC: 143 MMOL/L (ref 136–145)
WBC # BLD AUTO: 23.96 K/UL (ref 4.8–10.8)

## 2018-03-28 RX ADMIN — PANTOPRAZOLE SODIUM SCH MLS/MIN: 40 INJECTION, POWDER, FOR SOLUTION INTRAVENOUS at 10:52

## 2018-03-28 RX ADMIN — INSULIN ASPART SCH UNITS: 100 INJECTION, SOLUTION INTRAVENOUS; SUBCUTANEOUS at 18:00

## 2018-03-28 RX ADMIN — Medication SCH ML: at 17:25

## 2018-03-28 RX ADMIN — VANCOMYCIN HYDROCHLORIDE SCH MG: 1 INJECTION, POWDER, LYOPHILIZED, FOR SOLUTION INTRAVENOUS at 07:53

## 2018-03-28 RX ADMIN — NYSTATIN SCH ML: 100000 SUSPENSION ORAL at 07:53

## 2018-03-28 RX ADMIN — INSULIN ASPART SCH UNITS: 100 INJECTION, SOLUTION INTRAVENOUS; SUBCUTANEOUS at 12:00

## 2018-03-28 RX ADMIN — NYSTATIN SCH ML: 100000 SUSPENSION ORAL at 12:30

## 2018-03-28 RX ADMIN — INSULIN ASPART SCH UNITS: 100 INJECTION, SOLUTION INTRAVENOUS; SUBCUTANEOUS at 04:00

## 2018-03-28 RX ADMIN — VANCOMYCIN HYDROCHLORIDE SCH MG: 1 INJECTION, POWDER, LYOPHILIZED, FOR SOLUTION INTRAVENOUS at 17:25

## 2018-03-28 RX ADMIN — METOPROLOL TARTRATE SCH MG: 5 INJECTION, SOLUTION INTRAVENOUS at 10:52

## 2018-03-28 RX ADMIN — Medication SCH ML: at 07:52

## 2018-03-28 RX ADMIN — DEXTROSE MONOHYDRATE SCH MLS/HR: 50 INJECTION, SOLUTION INTRAVENOUS at 00:21

## 2018-03-28 RX ADMIN — METRONIDAZOLE SCH MLS/HR: 500 INJECTION, SOLUTION INTRAVENOUS at 22:00

## 2018-03-28 RX ADMIN — METRONIDAZOLE SCH MLS/HR: 500 INJECTION, SOLUTION INTRAVENOUS at 05:29

## 2018-03-28 RX ADMIN — NYSTATIN SCH ML: 100000 SUSPENSION ORAL at 17:26

## 2018-03-28 RX ADMIN — Medication SCH ML: at 21:45

## 2018-03-28 RX ADMIN — METRONIDAZOLE SCH MLS/HR: 500 INJECTION, SOLUTION INTRAVENOUS at 14:14

## 2018-03-28 RX ADMIN — CEFTRIAXONE SODIUM SCH MLS/HR: 1 INJECTION, POWDER, FOR SOLUTION INTRAVENOUS at 17:27

## 2018-03-28 RX ADMIN — VANCOMYCIN HYDROCHLORIDE SCH MG: 1 INJECTION, POWDER, LYOPHILIZED, FOR SOLUTION INTRAVENOUS at 21:45

## 2018-03-28 RX ADMIN — NITROGLYCERIN SCH INCH: 20 OINTMENT TOPICAL at 10:52

## 2018-03-28 RX ADMIN — NITROGLYCERIN SCH INCH: 20 OINTMENT TOPICAL at 04:06

## 2018-03-28 RX ADMIN — HEPARIN SODIUM SCH MLS/HR: 5000 INJECTION, SOLUTION INTRAVENOUS at 23:30

## 2018-03-28 RX ADMIN — Medication SCH ML: at 12:29

## 2018-03-28 RX ADMIN — INSULIN GLARGINE SCH UNITS: 100 INJECTION, SOLUTION SUBCUTANEOUS at 21:45

## 2018-03-28 RX ADMIN — NITROGLYCERIN SCH INCH: 20 OINTMENT TOPICAL at 22:00

## 2018-03-28 RX ADMIN — VANCOMYCIN HYDROCHLORIDE SCH MG: 1 INJECTION, POWDER, LYOPHILIZED, FOR SOLUTION INTRAVENOUS at 12:29

## 2018-03-28 RX ADMIN — METOPROLOL TARTRATE SCH MG: 5 INJECTION, SOLUTION INTRAVENOUS at 05:29

## 2018-03-28 RX ADMIN — METOPROLOL TARTRATE SCH MG: 25 TABLET, FILM COATED ORAL at 18:00

## 2018-03-28 RX ADMIN — Medication SCH ML: at 07:53

## 2018-03-28 RX ADMIN — NITROGLYCERIN SCH INCH: 20 OINTMENT TOPICAL at 17:27

## 2018-03-28 RX ADMIN — NYSTATIN SCH ML: 100000 SUSPENSION ORAL at 21:45

## 2018-03-28 NOTE — CARDIOLOGY PROGRESS NOTE
DATE: 03/28/2018

 

CARDIOLOGY CONSULTATION FOLLOWUP NOTE

 

The patient was seen and examined.  Chart, medications, and telemetry were

reviewed.

 

SUBJECTIVE:  The patient was more conversant and alert this afternoon. 

Anticipates being able to eat and take medications orally.  Denies any chest

pains.  Still mildly breathless.  Notes no abdominal discomfort.

 

OBJECTIVE:

VITAL SIGNS:  Heart rate is 87-90 and blood pressure is 164/92.

NECK:  Thin.  There is no distinct jugular venous distention.

LUNGS:  Reveal diminished breath sounds bibasilar.

CARDIOVASCULAR:  Regular with forceful apical impulse.

ABDOMEN:  Soft.

EXTREMITIES:  Without cyanosis or clubbing.  There is trace pedal edema.

 

LABORATORY DATA:  This afternoon, sodium is 142, potassium is 4.2, chloride

is 108, bicarbonate is 28, BUN is 25, creatinine 0.72, and calcium is 8.4.

 

IMPRESSION:  A 62-year-old female admitted with acute sepsis, course

complicated by paroxysmal atrial fibrillation, and acute renal insufficiency.

 Renal function returning to baseline and LV function improving.

 

PLAN:  Resume oral metoprolol.  Discontinue IV metoprolol.  Pleural effusion

may be continued to be an issue.  The patient has been switched from apixaban

to IV heparin appropriately.  Consideration may be made for thoracentesis if

persistent.  The patient will likely require at least intermittent diuretic

dosing to maintain a negative fluid balance.  We will continue to follow the

patient.

## 2018-03-28 NOTE — PHARMACY PROGRESS NOTE
Pharmacy Glycemic Short Note 2


Date of Service


Mar 28, 2018.





OUTPATIENT ANTIDIABETIC REGIMEN: 


* n/a











ASSESSMENT:


* Ms Calixto is a 61 y/o F with no significant PMH who presented with abdominal 

pain and lower extremity swelling. She has had a complicated hospitalization 

with Afib with RVR, development of severe Clostridium difficile infection, E 

coli bacteremia on Rocephin, and now severe hyperglycemia. Yesterday morning's 

blood sugar was 292 mg/dL on PRP. She received 22 units of insulin as a loading 

dose (full day's weight-based stress of 2 Lantus). The patient did have a low 

blood sugar afterwards (96 - 52 mg/dL). The patient pulled out her NG tube and 

is NPO.  


* The patient remained stable throughout the night. She was started on dextrose 

@ 40 cc/hr around midnight. Since the patient's HbA1C indicates that her 

diabetes is well controlled as an outpatient it is reasonable to assume the 

patient's infections are causing the spike in blood sugar along with the stress 

of her hospitalization. Provide loose scales for both Lantus and Novolog to 

prevent any recurrent hyperglycemia.














PLAN FOR INPATIENT GLYCEMIC CONTROL:


* Basal insulin


 * Lantus 5 units SQ HS if blood sugar greater than 180 mg/dL





* Bolus insulin


 * NovoLog per scale ACHS or Q6hrs while NPO


 * Goal Range:  Low 140 mg/dL - High 180 mg/dL


 * Correction Factor:  45 mg/dL/unit


 * Nutritional / Prandial insulin per carb ratio of  1 unit per 15 grams CHO 

consumed

## 2018-03-28 NOTE — SURGERY PROGRESS NOTE
Surgery Progress Note


Date of Service


Mar 28, 2018.





Subjective


Post OP Day:  HD # 8


More alert and oriented today, answers questions however softly spoken hard to 

understand at times. 


Denies of any abdominal pain, nausea or vomiting


passing gas and + bowel movements





Breathing improved today, no respiratory accessory muscle use. 


NGT  pulled out by patient last evening





Objective


Vital Signs:











  Date Time  Temp Pulse Resp B/P (MAP) Pulse Ox O2 Delivery O2 Flow Rate FiO2


 


3/28/18 08:00      Nasal Cannula 2.0 


 


3/28/18 07:34 36.9 78 18 154/79 (104) 95 Nasal Cannula  


 


3/28/18 05:29  81  172/94    


 


3/28/18 04:15  77  159/85 (109)    


 


3/28/18 04:00      Nasal Cannula 2.0 


 


3/28/18 03:00 36.9 75 20 179/112 (134) 99   


 


3/28/18 02:00  75 19 162/87 (112) 97 Nasal Cannula 2.0 


 


3/28/18 01:09  70  188/99 (128)    


 


3/28/18 00:25   29 212/111 (144) 94 Nasal Cannula 2.0 


 


3/28/18 00:13  75 22  100 Nasal Cannula 2.0 


 


3/27/18 23:59 36.9 74 32 194/117 (142) 98 Nasal Cannula 2.0 


 


3/27/18 23:59      Nasal Cannula 2.0 


 


3/27/18 23:45  86  216/122    


 


3/27/18 21:38  78  191/107 (135)    





    190/114 (139)    


 


3/27/18 20:00      Nasal Cannula 2.0 


 


3/27/18 19:40 36.4 68 22 175/95 (121) 95 Room Air  


 


3/27/18 16:18 36.4 63 22 172/93 (119) 100 Nasal Cannula 2.0 


 


3/27/18 16:00      Nasal Cannula 2.0 


 


3/27/18 12:00      Nasal Cannula 2.0 


 


3/27/18 12:00 36.6 62 22 146/87 (106) 98 Nasal Cannula 2.0 








General Appearance:  no apparent distress, + thin


Head:  normocephalic, atraumatic


Neck:  trachea midline


Respiratory/Chest:  no respiratory distress, no accessory muscle use


Abdomen:  non tender, non distended, soft, no organomegaly, no pulsatile mass


Laboratory Results:





Results Past 24 Hours








Test


  3/27/18


13:27 3/27/18


16:20 3/27/18


19:46 3/27/18


20:23 Range/Units


 


 


Bedside Glucose 236 96 52 146 70-90  mg/dl


 


Test


  3/27/18


23:02 3/27/18


23:42 3/28/18


02:25 3/28/18


03:59 Range/Units


 


 


White Blood Count 20.41    4.8-10.8  K/uL


 


Red Blood Count 4.41    4.2-5.4  M/uL


 


Hemoglobin 12.3    12.0-16.0  g/dL


 


Hematocrit 37.0    37-47  %


 


Mean Corpuscular Volume 83.9      fL


 


Mean Corpuscular Hemoglobin 27.9    25-34  pg


 


Mean Corpuscular Hemoglobin


Concent 33.2


  


  


  


  32-36  g/dl


 


 


Platelet Count 533    130-400  K/uL


 


Mean Platelet Volume 10.3    7.4-10.4  fL


 


Neutrophils (%) (Auto) 71.9     %


 


Lymphocytes (%) (Auto) 14.8     %


 


Monocytes (%) (Auto) 8.2     %


 


Eosinophils (%) (Auto) 1.2     %


 


Basophils (%) (Auto) 0.1     %


 


Neutrophils # (Auto) 14.66    1.4-6.5  K/uL


 


Lymphocytes # (Auto) 3.03    1.2-3.4  K/uL


 


Monocytes # (Auto) 1.67    0.11-0.59  K/uL


 


Eosinophils # (Auto) 0.25    0-0.5  K/uL


 


Basophils # (Auto) 0.03    0-0.2  K/uL


 


RDW Standard Deviation 43.4    36.4-46.3  fL


 


RDW Coefficient of Variation 14.3    11.5-14.5  %


 


Immature Granulocyte % (Auto) 3.8     %


 


Immature Granulocyte # (Auto) 0.77    0.00-0.02  K/uL


 


Prothrombin Time


  14.7


  


  


  


  9.0-12.0


SECONDS


 


Prothromb Time International


Ratio 1.4


  


  


  


  0.9-1.1  


 


 


Activated Partial


Thromboplast Time 36.4


  


  


  


  21.0-31.0


SECONDS


 


Partial Thromboplastin Ratio 1.4     


 


Sodium Level 143    136-145  mmol/L


 


Potassium Level 4.5    3.5-5.1  mmol/L


 


Chloride Level 111      mmol/L


 


Carbon Dioxide Level 26    21-32  mmol/L


 


Anion Gap 6.0    3-11  mmol/L


 


Blood Urea Nitrogen 34    7-18  mg/dl


 


Creatinine


  0.78


  


  


  


  0.60-1.20


mg/dl


 


Est Creatinine Clear Calc


Drug Dose 66.2


  


  


  


   ml/min


 


 


Estimated GFR (


American) 95.8


  


  


  


   


 


 


Estimated GFR (Non-


American 82.6


  


  


  


   


 


 


BUN/Creatinine Ratio 43.6    10-20  


 


Random Glucose 104    70-99  mg/dl


 


Estimated Average Glucose 140     mg/dl


 


Hemoglobin A1c 6.5    4.5-5.6  %


 


Calcium Level 9.0    8.5-10.1  mg/dl


 


Magnesium Level 1.9    1.8-2.4  mg/dl


 


Bedside Glucose  83 91 90 70-90  mg/dl


 


Test


  3/28/18


06:26 


  


  


  Range/Units


 


 


White Blood Count 23.96    4.8-10.8  K/uL


 


Red Blood Count 4.38    4.2-5.4  M/uL


 


Hemoglobin 12.2    12.0-16.0  g/dL


 


Hematocrit 36.7    37-47  %


 


Mean Corpuscular Volume 83.8      fL


 


Mean Corpuscular Hemoglobin 27.9    25-34  pg


 


Mean Corpuscular Hemoglobin


Concent 33.2


  


  


  


  32-36  g/dl


 


 


RDW Standard Deviation 42.5    36.4-46.3  fL


 


RDW Coefficient of Variation 14.3    11.5-14.5  %


 


Platelet Count 565    130-400  K/uL


 


Mean Platelet Volume 10.6    7.4-10.4  fL


 


Activated Partial


Thromboplast Time 68.9


  


  


  


  21.0-31.0


SECONDS


 


Partial Thromboplastin Ratio 2.7     











Assessment & Plan


C. Diff Colitis


   - increase in leukocytosis however patient afebrile


   - no abdominal pain, distention, or pain on examination


   - H& H stable


   - + bowel function, no blood in stools per nursing staff





Plan:


No acute surgical indication, benign abdomen


Obtain UA to rule out UTI given increase in leukocytosis


Obtain repeat CT scan of abdomen and pelvis


Given NGT has been pulled out, speech evaluation to see if patient can tolerate 

oral intake , if patient does not pass speech eval consider nasal feeding tube 

for nutrition and administration of oral vancomycin


Continue IV Ceftriaxone and Flagyl, await eval by infectious disease today in 

regards to their recs since NGT removed


Continue current medical management


Continue PT/OT


Will follow





Dr. Anand has seen and examined patient, agrees with above

## 2018-03-28 NOTE — DIAGNOSTIC IMAGING REPORT
SINGLE VIEW CHEST



CLINICAL HISTORY:  Dyspnea.



FINDINGS: An AP, portable, upright chest radiograph is compared to study dated

3/27/2018. Correlation is made with chest CT dated 3/20/2018. The examination is

degraded by portable technique and patient rotation.  An enteric tube has been

removed. The heart is enlarged. Pulmonary vascular congestion persists. There

are layering pleural effusions with bibasilar consolidation.  No pneumothorax is

seen. The skeletal structures are osteopenic. The bony thorax is grossly intact.



IMPRESSION:



1. Cardiomegaly with evidence of congestive failure. This is unchanged from

yesterday.



2. Layering pleural effusions with bibasilar consolidation. This likely

represents atelectasis. Clinical correlation will be required.







Electronically signed by:  Doroteo Jacobo M.D.

3/28/2018 6:58 AM



Dictated Date/Time:  3/28/2018 6:57 AM

## 2018-03-28 NOTE — ELECTROENCEPHALOGRAPH REPORT
CLINICAL DIAGNOSES:  Sepsis, paroxysmal atrial fibrillation and now recurrent

lethargy and confusion and agitation, question encephalopathy, seizure

activity, etc.

 

ELECTROENCEPHALOGRAPHY DIAGNOSIS:  Abnormal EEG with increased slow wave

activity, left hemisphere, maximum in the central regions during apparent

wakefulness.

 

DESCRIPTION OF TRACING:  This EEG was done as a bedside recording and is of

reasonable technical quality with few muscle movement artifacts, recorded

episodically and correlating with the patient movements seen on video

analysis.  Most of the time, the patient is quiet and the EEG activity is not

marred by muscle artifact.

 

Under these conditions, there is evidence for what appears to be a background

rhythm in the alpha range of up to 9-10 Hz of maximum frequency and of up to

50-60 microvolts of maximum amplitude.  This is slightly higher in amplitude

over the left hemisphere, but is present bilaterally and symmetrically

otherwise.  Polymorphic mid frequency modest amplitude theta activity is seen

over the right hemisphere in amounts that would appear to be essentially

normal, but over the left hemisphere this activity shifts into the lower

spectrum and is intermixed with higher amplitude at times, almost rhythmic

delta activity without any clinical manifestation being seen on video

analysis and without any clear cut potentially epileptogenic features

otherwise.  Beta activity is seen bifrontally and a fairly symmetrical

fashion but is difficult to dissect from the effects of muscle activity in

the same areas.

 

At no time during the waking tracing is there clear evidence for potentially

epileptogenic discharges.

 

INTERPRETATION:  This electroencephalography is abnormal in a focal fashion

suggesting the presence of structural disease involving the left hemisphere. 

Correlation with imaging studies is suggested.  Currently, there is nothing;

however, to suggest ongoing potentially epileptogenic activity.

## 2018-03-28 NOTE — DIAGNOSTIC IMAGING REPORT
ABD/PELVIS IV CONTRAST ONLY



CLINICAL HISTORY: 62 years-old Female presenting with worsening infection? f/u

c. diff, sepsis. 



TECHNIQUE: Multidetector CT of the abdomen and pelvis was performed after the

administration of intravenous contrast. IV contrast: 92 mL of Optiray 320. A

dose lowering technique was used consistent with the principles of ALARA (as low

as reasonably achievable). 



COMPARISON: 3/23/2018.



CT DOSE (mGy.cm): The estimated cumulative dose is 247.26 mGycm.



FINDINGS:



 topogram: Unremarkable.



Lung bases: Extensive lower lobe predominant consolidation volume loss. Biatrial

enlargement of the heart. Moderate to large bilateral pleural effusions, stable

to increased from prior. 



Liver: Normal morphology. No liver lesion. Periportal edema suggested. Patent

hepatic vasculature.



Biliary: No intrahepatic or extrahepatic biliary ductal dilatation. Nonspecific

mild gallbladder wall thickening or pericholecystic fluid. No abnormal

distention of the gallbladder.



Pancreas: Normal.



Spleen: Normal.



Adrenal glands: Hyperenhancing adrenal glands.



Kidneys and ureters: Normal. No hydronephrosis. Ureters poorly visualized.



Bladder: Partially decompressed with a Linares catheter. Intraluminal gas related

to the catheter.



Pelvic organs: Lobular uterus with distortion of the endometrial cavity likely

due to the presence of multiple fibroids. Prominent right ovary for the

patient's expected postmenopausal status. Left ovary poorly visualized.



Bowel: Fluid in the rectum suggests a diarrheal state. Redundant transverse

colon. The appendix is grossly normal. No bowel obstruction. Mucosal

hyperenhancement of the stomach.



Peritoneal cavity: Small abdominal pelvic ascites. Diffuse mesenteric edema. No

free intraperitoneal gas.



Lymph nodes: Enlarged bilateral external iliac lymph nodes in the obturator

regions, which measure 7 mm in short axis on the right and 9 mm in short axis on

the left.



Vasculature: Normal caliber patent abdominal aorta. IVC is dilated.



Abdominal wall: Diffuse anasarca.



Musculoskeletal: Normal.



IMPRESSION:

1.  Volume overload evidenced by increasing pleural effusions, anasarca,

periportal edema, ascites, and mesenteric edema.



2.  Biatrial enlargement suggests heart failure. Relatedly, findings suggestive

of hypoperfusion complex including hyperenhancement of the adrenal glands and

mucosal hyperenhancement of the stomach. No gross evidence of shock bowel. These

findings are relate to poor left cardiac output.



3.  Findings suggest a diarrheal state.



4.  Extensive bilateral lower lobe atelectasis secondary to the bilateral

effusions.











Electronically signed by:  Tay Koch M.D.

3/28/2018 11:38 AM



Dictated Date/Time:  3/28/2018 11:26 AM

## 2018-03-28 NOTE — DIAGNOSTIC IMAGING REPORT
ULTRASOUND OF THE CAROTID ARTERIES



CLINICAL HISTORY: Strokelike symptoms.



COMPARISON STUDY: No priors.



TECHNIQUE: Real-time, grayscale, and color Doppler sonography of the carotid

arteries is performed. Images are reviewed in the transverse and longitudinal

planes.



FINDINGS:



Blood pressures were not assessed due to the presence of IV catheters.



The carotid arteries are patent bilaterally and demonstrate antegrade flow.

There is no significant atherosclerotic plaque identified. Normal doppler

arterial waveforms are seen throughout. Velocity measurements are listed below.



Common carotid peak systolic velocity (cm/sec):   RIGHT: 127  LEFT: 137 

ICA proximal peak systolic velocity (cm/sec):         RIGHT: 59  LEFT: 54 

ICA mid peak systolic velocity (cm/sec):                 RIGHT: 59  LEFT: 74 

ICA distal peak systolic velocity (cm/sec):              RIGHT: 63  LEFT: 74 

ICA/CC peak systolic ratio:                                      RIGHT: 0.5 

LEFT: 0.5 



Antegrade flow was shown in the vertebral arteries. The external carotid

arteries are patent.





IMPRESSION: 



1. There is no sonographic evidence of hemodynamically significant stenosis in

the right or left carotid arterial system.



2. Antegrade flow is shown in the vertebral arteries.







Electronically signed by:  Doroteo Jacobo M.D.

3/28/2018 12:08 PM



Dictated Date/Time:  3/28/2018 12:07 PM

## 2018-03-28 NOTE — NEUROLOGY PROGRESS NOTES
Neurology Progress Note


Date of Service


Mar 28, 2018.





Augusta Stephens is a 62 year old female who presented to the  ED with c/o L leg pain 

and swelling. She was also having  generalized body ache symptoms, nausea, 

lower abd pain and was febrile, L leg suspected to have cellulitis. Her blood 

culture is growing gram negative bacilli. She was started on  Cefepime, Flagyl, 

Doxycycline. She had imaging studies including CT chest/abd/pelvis which is 

concerning for R heart congestion, pulmonary HTN, anasarca, abd ascites.  CT 

hypoperfusion complex suspected and  enlarged retroperitoneal and pelvic lymph 

nodes unclear etiology and may be related to congestive change or malignancy/

lymphoproliferative disease. She moved several years ago to [place] and 

lives with daughter. She used to be a [place] resident but lost insurance and hasn't 

had medical care for years. 


Today she is awake and answering simple questions. denies pain. There is no 

family in the room. She had an NG tube which she self removed. She is now on a 

heparin drip and is taking orals crushed in pureed foods. poor appetite.





Objective











  Date Time  Temp Pulse Resp B/P (MAP) Pulse Ox O2 Delivery O2 Flow Rate FiO2


 


3/28/18 12:00      Nasal Cannula 2.0 


 


3/28/18 11:39 37.0 83 18 165/90 (115) 96   


 


3/28/18 10:52  85  165/90    


 


3/28/18 08:00      Nasal Cannula 2.0 


 


3/28/18 07:34 36.9 78 18 154/79 (104) 95 Nasal Cannula  


 


3/28/18 05:29  81  172/94    


 


3/28/18 04:15  77  159/85 (109)    


 


3/28/18 04:00      Nasal Cannula 2.0 


 


3/28/18 03:00 36.9 75 20 179/112 (134) 99   


 


3/28/18 02:00  75 19 162/87 (112) 97 Nasal Cannula 2.0 


 


3/28/18 01:09  70  188/99 (128)    


 


3/28/18 00:25   29 212/111 (144) 94 Nasal Cannula 2.0 


 


3/28/18 00:13  75 22  100 Nasal Cannula 2.0 


 


3/27/18 23:59 36.9 74 32 194/117 (142) 98 Nasal Cannula 2.0 


 


3/27/18 23:59      Nasal Cannula 2.0 


 


3/27/18 23:45  86  216/122    


 


3/27/18 21:38  78  191/107 (135)    





    190/114 (139)    


 


3/27/18 20:00      Nasal Cannula 2.0 


 


3/27/18 19:40 36.4 68 22 175/95 (121) 95 Room Air  


 


3/27/18 16:18 36.4 63 22 172/93 (119) 100 Nasal Cannula 2.0 


 


3/27/18 16:00      Nasal Cannula 2.0 











Last 24 Hours








Test


  3/27/18


13:27 3/27/18


16:20 3/27/18


19:46 3/27/18


20:23


 


Bedside Glucose 236 mg/dl  96 mg/dl  52 mg/dl  146 mg/dl 


 


Test


  3/27/18


23:02 3/27/18


23:42 3/28/18


02:25 3/28/18


03:59


 


White Blood Count 20.41 K/uL    


 


Red Blood Count 4.41 M/uL    


 


Hemoglobin 12.3 g/dL    


 


Hematocrit 37.0 %    


 


Mean Corpuscular Volume 83.9 fL    


 


Mean Corpuscular Hemoglobin 27.9 pg    


 


Mean Corpuscular Hemoglobin


Concent 33.2 g/dl 


  


  


  


 


 


Platelet Count 533 K/uL    


 


Mean Platelet Volume 10.3 fL    


 


Neutrophils (%) (Auto) 71.9 %    


 


Lymphocytes (%) (Auto) 14.8 %    


 


Monocytes (%) (Auto) 8.2 %    


 


Eosinophils (%) (Auto) 1.2 %    


 


Basophils (%) (Auto) 0.1 %    


 


Neutrophils # (Auto) 14.66 K/uL    


 


Lymphocytes # (Auto) 3.03 K/uL    


 


Monocytes # (Auto) 1.67 K/uL    


 


Eosinophils # (Auto) 0.25 K/uL    


 


Basophils # (Auto) 0.03 K/uL    


 


RDW Standard Deviation 43.4 fL    


 


RDW Coefficient of Variation 14.3 %    


 


Immature Granulocyte % (Auto) 3.8 %    


 


Immature Granulocyte # (Auto) 0.77 K/uL    


 


Prothrombin Time 14.7 SECONDS    


 


Prothromb Time International


Ratio 1.4 


  


  


  


 


 


Activated Partial


Thromboplast Time 36.4 SECONDS 


  


  


  


 


 


Partial Thromboplastin Ratio 1.4    


 


Sodium Level 143 mmol/L    


 


Potassium Level 4.5 mmol/L    


 


Chloride Level 111 mmol/L    


 


Carbon Dioxide Level 26 mmol/L    


 


Anion Gap 6.0 mmol/L    


 


Blood Urea Nitrogen 34 mg/dl    


 


Creatinine 0.78 mg/dl    


 


Est Creatinine Clear Calc


Drug Dose 66.2 ml/min 


  


  


  


 


 


Estimated GFR (


American) 95.8 


  


  


  


 


 


Estimated GFR (Non-


American 82.6 


  


  


  


 


 


BUN/Creatinine Ratio 43.6    


 


Random Glucose 104 mg/dl    


 


Estimated Average Glucose 140 mg/dl    


 


Hemoglobin A1c 6.5 %    


 


Calcium Level 9.0 mg/dl    


 


Magnesium Level 1.9 mg/dl    


 


Bedside Glucose  83 mg/dl  91 mg/dl  90 mg/dl 


 


Test


  3/28/18


06:26 


  


  


 


 


White Blood Count 23.96 K/uL    


 


Red Blood Count 4.38 M/uL    


 


Hemoglobin 12.2 g/dL    


 


Hematocrit 36.7 %    


 


Mean Corpuscular Volume 83.8 fL    


 


Mean Corpuscular Hemoglobin 27.9 pg    


 


Mean Corpuscular Hemoglobin


Concent 33.2 g/dl 


  


  


  


 


 


RDW Standard Deviation 42.5 fL    


 


RDW Coefficient of Variation 14.3 %    


 


Platelet Count 565 K/uL    


 


Mean Platelet Volume 10.6 fL    


 


Activated Partial


Thromboplast Time 68.9 SECONDS 


  


  


  


 


 


Partial Thromboplastin Ratio 2.7    








Imaging:


carotid doppler- . There is no sonographic evidence of hemodynamically 

significant stenosis in the right or left carotid arterial system.  Antegrade 

flow is shown in the vertebral arteries.


CT head- 14 mm focal hypodensity in the region of the right cerebellum/inferior 

aspect of the right middle cerebellar peduncle. This may represent a subacute 

infarct. Short-term follow-up imaging is recommended, given the non specificity 

of this finding. No evidence of acute hemorrhage. No evidence of hydrocephalus.


EEG- This electroencephalography is abnormal in a focal fashion suggesting the 

presence of structural disease involving the left hemisphere.  Correlation with 

imaging studies is suggested.  Currently, there is nothing; however, to suggest 

ongoing potentially epileptogenic activity.


Exam:


Gen: alert with voice command


lungs course breath sounds


CV irregular


follow commands but unable to communicate where she is, states "your hands are 

cold" 


squeezes with both hands and pulls against resistance


lifts legs against gravity but not against resistance





Current Inpatient Medications








 Medications


  (Trade)  Dose


 Ordered  Sig/Wilman


 Route  Start Time


 Stop Time Status Last Admin


Dose Admin


 


 Acetaminophen


  (Tylenol Tab)  325 mg  Q6H  PRN


 PO  3/20/18 03:15


 4/19/18 03:14  3/20/18 23:29


325 MG


 


 Prochlorperazine


 Edisylate 5 mg/


 Syringe  5 ml @ 5


 mls/min  Q6H  PRN


 IV  3/20/18 03:15


 4/19/18 03:14  3/27/18 07:57


5 MLS/MIN


 


 Metronidazole 500


 mg/Prmx  100 ml @ 


 100 mls/hr  Q8H


 IV  3/20/18 14:00


 3/28/18 23:59  3/28/18 05:29


100 MLS/HR


 


 Pantoprazole


 Sodium 40 mg/


 Syringe  10 ml @ 5


 mls/min  DAILY@11


 IV  3/21/18 11:00


 4/20/18 10:59  3/28/18 10:52


5 MLS/MIN


 


 Raspberry


  (Raspberry Syrup


 5ml Cup)  5 ml  QID


 PO  3/21/18 09:30


 4/4/18 09:29  3/28/18 12:29


5 ML


 


 Vancomycin HCl


  (Vancomycin Oral


 Soln)  500 mg  QID


 PO  3/22/18 17:00


 4/5/18 16:59  3/28/18 12:29


500 MG


 


 Hydromorphone HCl


  (Dilaudid Inj)  0.5 mg  Q3H  PRN


 IV  3/23/18 10:45


 4/6/18 10:44  3/27/18 07:56


0.5 MG


 


 Ceftriaxone


 Sodium 2 gm/


 Dextrose  50 ml @ 


 100 mls/hr  Q24H


 IV  3/23/18 18:00


 4/1/18 17:59  3/27/18 17:24


100 MLS/HR


 


 Enteral


 Nutritional


 Formula


  (Peptamen 1.5)  1,000 ml  UD


 NG  3/24/18 11:45


 4/23/18 11:44  3/27/18 08:09


1,000 ML


 


 Enteral


 Nutritional


 Formula


  (Prosource No


 Carb)  30 ml  DAILY@0900


 NG  3/24/18 12:30


 4/23/18 12:29  3/27/18 08:03


30 ML


 


 Nystatin


  (Mycostatin Susp)  5 ml  QID


 PO  3/24/18 19:30


 3/31/18 19:29  3/28/18 12:30


5 ML


 


 Ondansetron HCl 6


 mg/Dextrose  53 ml @ 


 200 mls/hr  Q6H  PRN


 IV  3/25/18 00:30


 4/24/18 00:29  3/25/18 07:48


200 MLS/HR


 


 Potassium Chloride


  (Kelli Ciel Elix)  40 meq  BID


 PO  3/25/18 21:00


 4/24/18 20:59 Future Hold 3/27/18 08:00


40 MEQ


 


 Apixaban


  (Eliquis Tab)  5 mg  BID


 PO  3/25/18 16:00


 4/24/18 15:59 Future Hold 3/27/18 08:00


5 MG


 


 Metoprolol


 Tartrate


  (Lopressor Tab)  50 mg  Q6


 PO  3/26/18 12:00


 4/23/18 20:59 Future Hold 3/27/18 11:28


50 MG


 


 Nitroglycerin


  (Nitroglycerin


 2% Oint)  1 inch  Q6H


 EXT  3/26/18 10:00


 4/25/18 09:59  3/28/18 10:52


1 INCH


 


 Miscellaneous


 Information


  (Consult


 Glycemic


 Management


 Pharmacy)  1 ea  UD  PRN


 N/A  3/27/18 09:32


 4/26/18 09:31   


 


 


 Dextrose/Sodium


 Chloride  1,000 ml @ 


 75 mls/hr  [id number]


 IV  3/27/18 17:45


 4/26/18 17:44 Future Hold 3/27/18 17:50


75 MLS/HR


 


 Glucose


  (Glucose 40% Gel)  15-30


 GRAMS 15


 GRAMS...  UD  PRN


 PO  3/27/18 20:00


 4/26/18 19:59   


 


 


 Glucose


  (Glucose Chew


 Tab)  4-8


 Tablets 4


 Tabl...  UD  PRN


 PO  3/27/18 20:00


 4/26/18 19:59   


 


 


 Dextrose


  (Dextrose 50%


 50ML Syringe)  25-50ML OF


 50% DW IV


 FOR...  UD  PRN


 IV  3/27/18 20:00


 4/26/18 19:59   


 


 


 Glucagon


  (Glucagon Inj)  1 mg  UD  PRN


 SQ  3/27/18 20:00


 4/26/18 19:59   


 


 


 Metoprolol


 Tartrate


  (Lopressor Iv)  2.5 mg  Q6H


 IV.  3/28/18 00:00


 4/27/18 00:00  3/28/18 10:52


2.5 MG


 


 Heparin Sodium/


 Dextrose  500 ml @ 


 13 mls/hr  Q24H


 IV  3/27/18 23:30


 4/26/18 23:29  3/27/18 23:53


13 MLS/HR


 


 Acetaminophen 650


 mg/Empty Bag  65 ml @ 


 260 mls/hr  Q6H  PRN


 IV  3/27/18 23:45


 4/26/18 23:44   


 


 


 Dextrose  1,000 ml @ 


 40 mls/hr  Q24H


 IV  3/28/18 00:15


 4/27/18 00:14  3/28/18 00:21


40 MLS/HR


 


 Ipratropium


 Bromide


  (Atrovent 0.02%


 0.5MG/2.5ML Neb)  0.5 mg  Q4H  PRN


 INH  3/28/18 00:15


 4/27/18 00:14   


 


 


 Levalbuterol


  (Xopenex 1.25MG/


 0.5ML Neb)  1.25 mg  Q4H  PRN


 INH  3/28/18 00:15


 4/27/18 00:14   


 


 


 Insulin Aspart


  (novoLOG ASPART)  SLIDING


 SCALE  Q6


 SC  3/28/18 12:00


 4/27/18 10:59   


 


 


 Ioversol


  (Optiray 320)  100 ml  UD  PRN


 IV  3/28/18 08:45


 4/1/18 08:44   


 


 


 Insulin Glargine


  (Lantus Solostar


 Pen)  SEE


 PROTOCOL


 TEXT  HS


 SC  3/28/18 21:00


 4/27/18 20:59   


 











Impression


62 year old female with complex medical issues -confusion





Plan


1. medical management per primary team and ICU


2. EEG no clear epileptic activity increased slowing on left 


3. metabolic encephalopathy 


4. currently on heparin gtt due to new infarct on CT head


5. taking orals but not good appetite may need supplemental nutrition


6. repeat MRI brain without contrast to identify and further areas of infarct


7. unclear what her baseline is but she does seem more responsive today





I have seen and discussed above patient with Dr Fernando Sow , neurology





Pateint seen and is a bit better than yesterday but worse than over the weekend 

and eeg shows slowing left hemisphere with ct showing infarct right deep 

cerebellum  nee mri to assess for other potential embolic events and heparin 

until she can get back on her oral agents  exam non helpful due to lack of 

cooperatin and is still dysarthric  perhaps some aphasic elements as well and 

has a soft right facial asymmetry  but no clear cerebellar signs despite the ct 

report and images  will see tomorrow hopefully after mri is done  Fernando Sow MD

## 2018-03-29 VITALS
HEART RATE: 63 BPM | SYSTOLIC BLOOD PRESSURE: 146 MMHG | TEMPERATURE: 97.7 F | OXYGEN SATURATION: 94 % | DIASTOLIC BLOOD PRESSURE: 90 MMHG

## 2018-03-29 VITALS
OXYGEN SATURATION: 94 % | SYSTOLIC BLOOD PRESSURE: 128 MMHG | TEMPERATURE: 97.52 F | DIASTOLIC BLOOD PRESSURE: 88 MMHG | HEART RATE: 70 BPM

## 2018-03-29 VITALS
HEART RATE: 75 BPM | TEMPERATURE: 97.88 F | OXYGEN SATURATION: 93 % | SYSTOLIC BLOOD PRESSURE: 162 MMHG | DIASTOLIC BLOOD PRESSURE: 81 MMHG

## 2018-03-29 VITALS
TEMPERATURE: 97.52 F | DIASTOLIC BLOOD PRESSURE: 69 MMHG | SYSTOLIC BLOOD PRESSURE: 122 MMHG | OXYGEN SATURATION: 94 % | HEART RATE: 62 BPM

## 2018-03-29 VITALS
DIASTOLIC BLOOD PRESSURE: 91 MMHG | SYSTOLIC BLOOD PRESSURE: 173 MMHG | OXYGEN SATURATION: 91 % | HEART RATE: 82 BPM | TEMPERATURE: 98.78 F

## 2018-03-29 VITALS — OXYGEN SATURATION: 92 %

## 2018-03-29 VITALS
DIASTOLIC BLOOD PRESSURE: 109 MMHG | SYSTOLIC BLOOD PRESSURE: 148 MMHG | OXYGEN SATURATION: 92 % | HEART RATE: 80 BPM | TEMPERATURE: 98.06 F

## 2018-03-29 LAB
BUN SERPL-MCNC: 21 MG/DL (ref 7–18)
CALCIUM SERPL-MCNC: 8.1 MG/DL (ref 8.5–10.1)
CO2 SERPL-SCNC: 28 MMOL/L (ref 21–32)
CREAT SERPL-MCNC: 0.69 MG/DL (ref 0.6–1.2)
EOSINOPHIL NFR BLD AUTO: 574 K/UL (ref 130–400)
GLUCOSE SERPL-MCNC: 124 MG/DL (ref 70–99)
HCT VFR BLD CALC: 32.9 % (ref 37–47)
HGB BLD-MCNC: 11.2 G/DL (ref 12–16)
MCH RBC QN AUTO: 28.1 PG (ref 25–34)
MCHC RBC AUTO-ENTMCNC: 34 G/DL (ref 32–36)
MCV RBC AUTO: 82.5 FL (ref 80–100)
PMV BLD AUTO: 10.2 FL (ref 7.4–10.4)
POTASSIUM SERPL-SCNC: 3.8 MMOL/L (ref 3.5–5.1)
PTT PATIENT: 52.6 SECONDS (ref 21–31)
PTT PATIENT: 82.9 SECONDS (ref 21–31)
RED CELL DISTRIBUTION WIDTH CV: 14.5 % (ref 11.5–14.5)
RED CELL DISTRIBUTION WIDTH SD: 42.6 FL (ref 36.4–46.3)
SODIUM SERPL-SCNC: 140 MMOL/L (ref 136–145)
WBC # BLD AUTO: 19.4 K/UL (ref 4.8–10.8)

## 2018-03-29 RX ADMIN — INSULIN ASPART SCH UNITS: 100 INJECTION, SOLUTION INTRAVENOUS; SUBCUTANEOUS at 00:00

## 2018-03-29 RX ADMIN — VANCOMYCIN HYDROCHLORIDE SCH MG: 1 INJECTION, POWDER, LYOPHILIZED, FOR SOLUTION INTRAVENOUS at 07:54

## 2018-03-29 RX ADMIN — INSULIN GLARGINE SCH UNITS: 100 INJECTION, SOLUTION SUBCUTANEOUS at 21:00

## 2018-03-29 RX ADMIN — METOPROLOL TARTRATE SCH MG: 25 TABLET, FILM COATED ORAL at 00:01

## 2018-03-29 RX ADMIN — METOPROLOL TARTRATE SCH MG: 25 TABLET, FILM COATED ORAL at 04:44

## 2018-03-29 RX ADMIN — METOPROLOL TARTRATE SCH MG: 25 TABLET, FILM COATED ORAL at 12:02

## 2018-03-29 RX ADMIN — NITROGLYCERIN SCH INCH: 20 OINTMENT TOPICAL at 04:43

## 2018-03-29 RX ADMIN — INSULIN ASPART SCH UNITS: 100 INJECTION, SOLUTION INTRAVENOUS; SUBCUTANEOUS at 07:00

## 2018-03-29 RX ADMIN — CEFTRIAXONE SODIUM SCH MLS/HR: 1 INJECTION, POWDER, FOR SOLUTION INTRAVENOUS at 17:29

## 2018-03-29 RX ADMIN — METOPROLOL TARTRATE SCH MG: 25 TABLET, FILM COATED ORAL at 23:39

## 2018-03-29 RX ADMIN — VANCOMYCIN HYDROCHLORIDE SCH MG: 1 INJECTION, POWDER, LYOPHILIZED, FOR SOLUTION INTRAVENOUS at 21:21

## 2018-03-29 RX ADMIN — INSULIN ASPART SCH UNITS: 100 INJECTION, SOLUTION INTRAVENOUS; SUBCUTANEOUS at 12:01

## 2018-03-29 RX ADMIN — PANTOPRAZOLE SODIUM SCH MLS/MIN: 40 INJECTION, POWDER, FOR SOLUTION INTRAVENOUS at 11:07

## 2018-03-29 RX ADMIN — NYSTATIN SCH ML: 100000 SUSPENSION ORAL at 17:30

## 2018-03-29 RX ADMIN — NYSTATIN SCH ML: 100000 SUSPENSION ORAL at 07:56

## 2018-03-29 RX ADMIN — INSULIN ASPART SCH UNITS: 100 INJECTION, SOLUTION INTRAVENOUS; SUBCUTANEOUS at 17:34

## 2018-03-29 RX ADMIN — Medication SCH ML: at 12:02

## 2018-03-29 RX ADMIN — METOPROLOL TARTRATE SCH MG: 25 TABLET, FILM COATED ORAL at 17:30

## 2018-03-29 RX ADMIN — INSULIN ASPART SCH UNITS: 100 INJECTION, SOLUTION INTRAVENOUS; SUBCUTANEOUS at 21:00

## 2018-03-29 RX ADMIN — VANCOMYCIN HYDROCHLORIDE SCH MG: 1 INJECTION, POWDER, LYOPHILIZED, FOR SOLUTION INTRAVENOUS at 17:29

## 2018-03-29 RX ADMIN — NITROGLYCERIN SCH INCH: 20 OINTMENT TOPICAL at 10:00

## 2018-03-29 RX ADMIN — NITROGLYCERIN SCH INCH: 20 OINTMENT TOPICAL at 21:32

## 2018-03-29 RX ADMIN — NYSTATIN SCH ML: 100000 SUSPENSION ORAL at 21:00

## 2018-03-29 RX ADMIN — VANCOMYCIN HYDROCHLORIDE SCH MG: 1 INJECTION, POWDER, LYOPHILIZED, FOR SOLUTION INTRAVENOUS at 12:03

## 2018-03-29 RX ADMIN — HEPARIN SODIUM SCH MLS/HR: 5000 INJECTION, SOLUTION INTRAVENOUS at 08:31

## 2018-03-29 RX ADMIN — Medication SCH ML: at 08:03

## 2018-03-29 RX ADMIN — Medication SCH ML: at 07:52

## 2018-03-29 RX ADMIN — DEXTROSE MONOHYDRATE SCH MLS/HR: 50 INJECTION, SOLUTION INTRAVENOUS at 00:02

## 2018-03-29 RX ADMIN — NYSTATIN SCH ML: 100000 SUSPENSION ORAL at 12:02

## 2018-03-29 RX ADMIN — NITROGLYCERIN SCH INCH: 20 OINTMENT TOPICAL at 17:24

## 2018-03-29 RX ADMIN — Medication SCH ML: at 21:19

## 2018-03-29 RX ADMIN — Medication SCH ML: at 17:29

## 2018-03-29 NOTE — DIAGNOSTIC IMAGING REPORT
MRI OF THE BRAIN WITHOUT  CONTRAST



CLINICAL HISTORY: Stroke. Atrial fibrillation.    



COMPARISON STUDY: CT scan dated 3/27/2018, MRI the brain dated 3/21/2018



FINDINGS:

Sagittal T1, axial diffusion, proton density and T2 weighted axial, coronal

FLAIR, and axial T1-weighted images were acquired. 

No intra or extra-axial mass lesions are visualized

Since the prior study, the patient has developed multiple posterior fossa

infarcts involving the cerebellar hemispheres and right and left middle

cerebellar peduncles. There is a stable punctate focus of restricted water

diffusion within the left occipital lobe.

There is no evidence of ventricular dilatation.

Proton density T2-weighted and FLAIR images reveal scattered foci of increased

T2 signal within the white matter, likely on a small vessel basis. In addition

the multiple posterior fossa infarcts demonstrate increased T2 and FLAIR signal.

There are no abnormal flow voids.



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

both cerebellar hemispheres as well as the left and right middle cerebellar

peduncles.







Electronically signed by:  Rick Benavidez M.D.

3/29/2018 6:38 AM



Dictated Date/Time:  3/29/2018 6:35 AM

## 2018-03-29 NOTE — DIAGNOSTIC IMAGING REPORT
CHEST ONE VIEW PORTABLE



CLINICAL HISTORY: Pleural effusions    



COMPARISON STUDY:  3/28/2018



FINDINGS: The cardiac and mediastinal contours remain stable. There are

persistent moderate bilateral pleural effusions. There are associated bibasal

airspace opacities, likely representing compressive atelectasis. There is

improving mild central pulmonary vascular congestion.[ 



IMPRESSION: Improving mild central pulmonary vascular congestion. Persistent

moderate bilateral pleural effusions with bibasilar opacities







Electronically signed by:  Rick Benavidez M.D.

3/29/2018 10:52 AM



Dictated Date/Time:  3/29/2018 10:51 AM

## 2018-03-29 NOTE — SURGERY PROGRESS NOTE
Surgery Progress Note


Date of Service


Mar 29, 2018.





Subjective


Post OP Day:  HD #9


patient more alert today


sitting up in bed, speech therapy at bedside


currently on room air





Per nursing staff unable to move left leg and able to hold up right leg for a 

few seconds








MRI of brain without contrast:


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


both cerebellar hemispheres as well as the left and right middle cerebellar


peduncles.





Objective


Vital Signs:











  Date Time  Temp Pulse Resp B/P (MAP) Pulse Ox O2 Delivery O2 Flow Rate FiO2


 


3/29/18 12:00     92 Room Air  


 


3/29/18 11:46 36.6 75 16 162/81 (108) 93 Room Air  


 


3/29/18 08:00     92 Room Air  


 


3/29/18 07:39 36.7 80 16 148/109 (122) 92 Room Air  


 


3/29/18 05:46 37.1 82 19 173/91 (118) 91 Room Air  


 


3/29/18 04:20      Room Air  


 


3/29/18 00:15      Room Air  


 


3/28/18 23:03 36.7 87 26 146/70 (95) 96 Room Air  


 


3/28/18 15:36 36.8 87 20 164/92 (116) 95 Nasal Cannula 2.0 








General Appearance:  WD/WN, no apparent distress


Head:  normocephalic, atraumatic


Neck:  trachea midline


Respiratory/Chest:  no respiratory distress, no accessory muscle use


Abdomen:  non tender, non distended, soft, no organomegaly, no pulsatile mass


Laboratory Results:





Results Past 24 Hours








Test


  3/28/18


13:41 3/28/18


16:17 3/28/18


20:13 3/29/18


05:53 Range/Units


 


 


Sodium Level 142   140 136-145  mmol/L


 


Potassium Level 4.2   3.8 3.5-5.1  mmol/L


 


Chloride Level 108   106   mmol/L


 


Carbon Dioxide Level 28   28 21-32  mmol/L


 


Anion Gap 7.0   6.0 3-11  mmol/L


 


Blood Urea Nitrogen 25   21 7-18  mg/dl


 


Creatinine


  0.72


  


  


  0.69


  0.60-1.20


mg/dl


 


Est Creatinine Clear Calc


Drug Dose 71.8


  


  


  74.9


   ml/min


 


 


Estimated GFR (


American) 105.5


  


  


  109.7


   


 


 


Estimated GFR (Non-


American 91.0


  


  


  94.6


   


 


 


BUN/Creatinine Ratio 35.4   30.3 10-20  


 


Random Glucose 81   124 70-99  mg/dl


 


Calcium Level 8.4   8.1 8.5-10.1  mg/dl


 


Bedside Glucose  75 139  70-90  mg/dl


 


White Blood Count    19.40 4.8-10.8  K/uL


 


Red Blood Count    3.99 4.2-5.4  M/uL


 


Hemoglobin    11.2 12.0-16.0  g/dL


 


Hematocrit    32.9 37-47  %


 


Mean Corpuscular Volume    82.5   fL


 


Mean Corpuscular Hemoglobin    28.1 25-34  pg


 


Mean Corpuscular Hemoglobin


Concent 


  


  


  34.0


  32-36  g/dl


 


 


RDW Standard Deviation    42.6 36.4-46.3  fL


 


RDW Coefficient of Variation    14.5 11.5-14.5  %


 


Platelet Count    574 130-400  K/uL


 


Mean Platelet Volume    10.2 7.4-10.4  fL


 


Activated Partial


Thromboplast Time 


  


  


  82.9


  21.0-31.0


SECONDS


 


Partial Thromboplastin Ratio    3.2  


 


Test


  3/29/18


06:42 


  


  


  Range/Units


 


 


Bedside Glucose 113    70-90  mg/dl











Assessment & Plan


C. Diff Colitis


   - improvement of leukocytosis 19K today, afebrile


   - no abdominal pain, distention, or pain on examination


   - H& H stable


   - + bowel function, no blood in stools per nursing staff


   - Repeat CT scan showing no signs of shock bowel





Plan:


No acute surgical indication, benign abdomen


Continue IV Ceftriaxone and Flagyl, oral vancomycin


Continue current medical management


Continue PT/OT


Will follow





Dr. Anand has seen and examined patient, agrees with above

## 2018-03-29 NOTE — MEDICAL CONSULT
Consultation


Date of Consultation:


Mar 29, 2018.


Attending Physician:


Gary Wills DO


Reason for Consultation:


Labial swelling


History of Present Illness


Patient is a 61 yo  postmenopausal female who has been admitted for 

severe sepsis, E.coli on blood cx and on IV AB's


Nursing team noted left labial swelling this morning and I was called for a 

consult


Patient is sleeping and wakes up upon asking


She is very hard to understand and poor historian


She denies any gyn problems neither past history


She denies pelvic pain/ pain or swelling on vulva/ genitalia


She has been menopausal since age 51.


She seems to be scared from speculum and declined speculum exa.


She agreed for me to look and feel from outside





Past Medical/Surgical History


Medical Problems:


(1) Anasarca


Status: Acute  





(2) Atrial fibrillation with RVR


Status: Acute  





(3) C. difficile diarrhea


Status: Acute  





(4) Elevated troponin


Status: Acute  





(5) Hyperthyroidism


Status: Acute  





(6) Hypotension


Status: Acute  





(7) Left leg cellulitis


Status: Acute  





(8) Left leg pain


Status: Acute  





(9) Right-sided heart failure


Status: Acute  





(10) Sepsis


Status: Acute  








Social History


Smoking Status:  Never Smoker


Smokeless Tobacco Use:  No


Alcohol Use:  none


Drug Use:  none


Marital Status:  single


Housing Status:  lives with family


Occupation Status:  other





Allergies


Coded Allergies:  


     Sulfa Antibiotics (Verified  Allergy, Unknown, swelling, 3/19/18)





Current Inpatient Medications





Current Inpatient Medications








 Medications


  (Trade)  Dose


 Ordered  Sig/Wilman


 Route  Start Time


 Stop Time Status Last Admin


Dose Admin


 


 Acetaminophen


  (Tylenol Tab)  325 mg  Q6H  PRN


 PO  3/20/18 03:15


 18 03:14  3/20/18 23:29


325 MG


 


 Pantoprazole


 Sodium 40 mg/


 Syringe  10 ml @ 5


 mls/min  DAILY@11


 IV  3/21/18 11:00


 18 10:59  3/29/18 11:07


5 MLS/MIN


 


 Raspberry


  (Raspberry Syrup


 5ml Cup)  5 ml  QID


 PO  3/21/18 09:30


 18 09:29  3/29/18 12:02


5 ML


 


 Vancomycin HCl


  (Vancomycin Oral


 Soln)  500 mg  QID


 PO  3/22/18 17:00


 18 16:59  3/29/18 12:03


500 MG


 


 Hydromorphone HCl


  (Dilaudid Inj)  0.5 mg  Q3H  PRN


 IV  3/23/18 10:45


 18 10:44  3/27/18 07:56


0.5 MG


 


 Ceftriaxone


 Sodium 2 gm/


 Dextrose  50 ml @ 


 100 mls/hr  Q24H


 IV  3/23/18 18:00


 18 17:59  3/28/18 17:27


100 MLS/HR


 


 Nystatin


  (Mycostatin Susp)  5 ml  QID


 PO  3/24/18 19:30


 3/31/18 19:29  3/29/18 12:02


5 ML


 


 Ondansetron HCl 6


 mg/Dextrose  53 ml @ 


 200 mls/hr  Q6H  PRN


 IV  3/25/18 00:30


 18 00:29  3/25/18 07:48


200 MLS/HR


 


 Nitroglycerin


  (Nitroglycerin


 2% Oint)  1 inch  Q6H


 EXT  3/26/18 10:00


 18 09:59  3/29/18 10:00


1 INCH


 


 Miscellaneous


 Information


  (Consult


 Glycemic


 Management


 Pharmacy)  1 ea  UD  PRN


 N/A  3/27/18 09:32


 18 09:31   


 


 


 Glucose


  (Glucose 40% Gel)  15-30


 GRAMS 15


 GRAMS...  UD  PRN


 PO  3/27/18 20:00


 18 19:59   


 


 


 Glucose


  (Glucose Chew


 Tab)  4-8


 Tablets 4


 Tabl...  UD  PRN


 PO  3/27/18 20:00


 18 19:59   


 


 


 Dextrose


  (Dextrose 50%


 50ML Syringe)  25-50ML OF


 50% DW IV


 FOR...  UD  PRN


 IV  3/27/18 20:00


 18 19:59   


 


 


 Glucagon


  (Glucagon Inj)  1 mg  UD  PRN


 SQ  3/27/18 20:00


 18 19:59   


 


 


 Heparin Sodium/


 Dextrose  500 ml @ 


 11 mls/hr  Q24H


 IV  3/27/18 23:30


 18 23:29  3/29/18 08:31


11 MLS/HR


 


 Acetaminophen 650


 mg/Empty Bag  65 ml @ 


 260 mls/hr  Q6H  PRN


 IV  3/27/18 23:45


 18 23:44   


 


 


 Ipratropium


 Bromide


  (Atrovent 0.02%


 0.5MG/2.5ML Neb)  0.5 mg  Q4H  PRN


 INH  3/28/18 00:15


 18 00:14   


 


 


 Levalbuterol


  (Xopenex 1.25MG/


 0.5ML Neb)  1.25 mg  Q4H  PRN


 INH  3/28/18 00:15


 18 00:14   


 


 


 Ioversol


  (Optiray 320)  100 ml  UD  PRN


 IV  3/28/18 08:45


 18 08:44   


 


 


 Insulin Glargine


  (Lantus Solostar


 Pen)  SEE


 PROTOCOL


 TEXT  HS


 SC  3/28/18 21:00


 18 20:59   


 


 


 Metoprolol


 Tartrate


  (Lopressor Tab)  25 mg  Q6


 PO  3/28/18 18:00


 18 20:59  3/29/18 12:02


25 MG


 


 Insulin Aspart


  (novoLOG ASPART)  SLIDING


 SCALE  ACHS


 SC  3/29/18 07:00


 18 06:59  3/29/18 12:01


2 UNITS


 


 Hydralazine HCl


  (Apresoline Tab)  12.5 mg  TID


 PO  3/29/18 14:00


 18 13:59  3/29/18 14:01


12.5 MG











Physical Exam











  Date Time  Temp Pulse Resp B/P (MAP) Pulse Ox O2 Delivery O2 Flow Rate FiO2


 


3/29/18 12:00     92 Room Air  


 


3/29/18 11:46 36.6 75 16 162/81 (108) 93 Room Air  


 


3/29/18 08:00     92 Room Air  


 


3/29/18 07:39 36.7 80 16 148/109 (122) 92 Room Air  


 


3/29/18 05:46 37.1 82 19 173/91 (118) 91 Room Air  


 


3/29/18 04:20      Room Air  


 


3/29/18 00:15      Room Air  


 


3/28/18 23:03 36.7 87 26 146/70 (95) 96 Room Air  


 


3/28/18 15:36 36.8 87 20 164/92 (116) 95 Nasal Cannula 2.0 








General Appearance:  + thin


Head:  normocephalic


Eyes:  normal inspection


Abdomen/GI:  non tender, + guarding, + pertinent finding (non distended)


Genitourinary - Female:  + pertinent finding (Left labia swollen diffusely, 

mild to moderate, soft, NT, no erythema or exudate, no abscess, no Bartholin 

cyst, it seems to have non specific soft tissue swelling. Right labia normal. 

Introitus normal. No cyst or swelling on Bartholin gland area. Patient declined 

speculum exam and cervix, vagina were not visualised.)





Laboratory Results





Last 24 Hours








Test


  3/28/18


16:17 3/28/18


20:13 3/29/18


05:53 3/29/18


06:42


 


Bedside Glucose 75 mg/dl  139 mg/dl   113 mg/dl 


 


White Blood Count   19.40 K/uL  


 


Red Blood Count   3.99 M/uL  


 


Hemoglobin   11.2 g/dL  


 


Hematocrit   32.9 %  


 


Mean Corpuscular Volume   82.5 fL  


 


Mean Corpuscular Hemoglobin   28.1 pg  


 


Mean Corpuscular Hemoglobin


Concent 


  


  34.0 g/dl 


  


 


 


RDW Standard Deviation   42.6 fL  


 


RDW Coefficient of Variation   14.5 %  


 


Platelet Count   574 K/uL  


 


Mean Platelet Volume   10.2 fL  


 


Activated Partial


Thromboplast Time 


  


  82.9 SECONDS 


  


 


 


Partial Thromboplastin Ratio   3.2  


 


Sodium Level   140 mmol/L  


 


Potassium Level   3.8 mmol/L  


 


Chloride Level   106 mmol/L  


 


Carbon Dioxide Level   28 mmol/L  


 


Anion Gap   6.0 mmol/L  


 


Blood Urea Nitrogen   21 mg/dl  


 


Creatinine   0.69 mg/dl  


 


Est Creatinine Clear Calc


Drug Dose 


  


  74.9 ml/min 


  


 


 


Estimated GFR (


American) 


  


  109.7 


  


 


 


Estimated GFR (Non-


American 


  


  94.6 


  


 


 


BUN/Creatinine Ratio   30.3  


 


Random Glucose   124 mg/dl  


 


Calcium Level   8.1 mg/dl  


 


Test


  3/29/18


11:04 3/29/18


12:57 3/29/18


14:10 


 


 


Bedside Glucose 142 mg/dl    


 


Activated Partial


Thromboplast Time 


  52.6 SECONDS 


  


  


 


 


Partial Thromboplastin Ratio  2.0   


 


Urine Color   DK YELLOW  


 


Urine Appearance   CLEAR  


 


Urine pH   6.0  


 


Urine Specific Gravity   1.026  


 


Urine Protein   NEG  


 


Urine Glucose (UA)   NEG  


 


Urine Ketones   NEG  


 


Urine Occult Blood   NEG  


 


Urine Nitrite   POS  


 


Urine Bilirubin   NEG  


 


Urine Urobilinogen   NEG  


 


Urine Leukocyte Esterase   TRACE  


 


Urine WBC (Auto)   5-10 /hpf  


 


Urine RBC (Auto)   5-10 /hpf  


 


Urine Hyaline Casts (Auto)   5-10 /lpf  


 


Urine Epithelial Cells (Auto)   20-30 /lpf  


 


Urine Bacteria (Auto)   NEG  


 


Urine Crystals   TALC  


 


Urine Yeast (Auto)   BUDDING  











Assessment & Plan


61 yo  postmenopausal female admitted for severe sepsis, E. Coli 

bacteremia, on IV AB


Incidental finding of left labial swelling


Asymptomatic


Appears to be soft tissue swelling


No s/s of abscess neither cyst


No s/s Bartholin cyst/ abscess





Recommend cold compresses and expectant management


Should resolve on its own


If able recommend outpatient follow up for complete pelvic exam and pap smear





Thank you for the consult

## 2018-03-29 NOTE — NEUROLOGY PROGRESS NOTES
Neurology Progress Note


Date of Service


Mar 29, 2018.





Augusta Stephens is a 62 year old female who presented to the  ED with c/o L leg pain 

and swelling. She was also having  generalized body ache symptoms, nausea, 

lower abd pain and was febrile, L leg suspected to have cellulitis. Her blood 

culture is growing gram negative bacilli. She was started on  Cefepime, Flagyl, 

Doxycycline. She had imaging studies including CT chest/abd/pelvis which is 

concerning for R heart congestion, pulmonary HTN, anasarca, abd ascites.  CT 

hypoperfusion complex suspected and  enlarged retroperitoneal and pelvic lymph 

nodes unclear etiology and may be related to congestive change or malignancy/

lymphoproliferative disease. She moved several years ago to [place] and 

lives with daughter. She used to be a [place] resident but lost insurance and hasn't 

had medical care for years. 


Today she is sitting up and eating pudding. She is woke this am per nursing 

staff on this shift conversing





Objective











  Date Time  Temp Pulse Resp B/P (MAP) Pulse Ox O2 Delivery O2 Flow Rate FiO2


 


3/29/18 12:00     92 Room Air  


 


3/29/18 11:46 36.6 75 16 162/81 (108) 93 Room Air  


 


3/29/18 08:00     92 Room Air  


 


3/29/18 07:39 36.7 80 16 148/109 (122) 92 Room Air  


 


3/29/18 05:46 37.1 82 19 173/91 (118) 91 Room Air  


 


3/29/18 04:20      Room Air  


 


3/29/18 00:15      Room Air  


 


3/28/18 23:03 36.7 87 26 146/70 (95) 96 Room Air  


 


3/28/18 15:36 36.8 87 20 164/92 (116) 95 Nasal Cannula 2.0 











Last 24 Hours








Test


  3/28/18


13:41 3/28/18


16:17 3/28/18


20:13 3/29/18


05:53


 


Sodium Level 142 mmol/L    140 mmol/L 


 


Potassium Level 4.2 mmol/L    3.8 mmol/L 


 


Chloride Level 108 mmol/L    106 mmol/L 


 


Carbon Dioxide Level 28 mmol/L    28 mmol/L 


 


Anion Gap 7.0 mmol/L    6.0 mmol/L 


 


Blood Urea Nitrogen 25 mg/dl    21 mg/dl 


 


Creatinine 0.72 mg/dl    0.69 mg/dl 


 


Est Creatinine Clear Calc


Drug Dose 71.8 ml/min 


  


  


  74.9 ml/min 


 


 


Estimated GFR (


American) 105.5 


  


  


  109.7 


 


 


Estimated GFR (Non-


American 91.0 


  


  


  94.6 


 


 


BUN/Creatinine Ratio 35.4    30.3 


 


Random Glucose 81 mg/dl    124 mg/dl 


 


Calcium Level 8.4 mg/dl    8.1 mg/dl 


 


Bedside Glucose  75 mg/dl  139 mg/dl  


 


White Blood Count    19.40 K/uL 


 


Red Blood Count    3.99 M/uL 


 


Hemoglobin    11.2 g/dL 


 


Hematocrit    32.9 % 


 


Mean Corpuscular Volume    82.5 fL 


 


Mean Corpuscular Hemoglobin    28.1 pg 


 


Mean Corpuscular Hemoglobin


Concent 


  


  


  34.0 g/dl 


 


 


RDW Standard Deviation    42.6 fL 


 


RDW Coefficient of Variation    14.5 % 


 


Platelet Count    574 K/uL 


 


Mean Platelet Volume    10.2 fL 


 


Activated Partial


Thromboplast Time 


  


  


  82.9 SECONDS 


 


 


Partial Thromboplastin Ratio    3.2 


 


Test


  3/29/18


06:42 3/29/18


12:57 


  


 


 


Bedside Glucose 113 mg/dl    








Imaging:


MRI without contrast- :  Interval development of multiple posterior fossa 

infarcts involving both cerebellar hemispheres as well as the left and right 

middle cerebellar peduncles.


Exam:


Gen: alert and oriented to [facility], 2018. 


lungs course breath sounds


CV regular


facial symmetry


follows all commands


UE biceps triceps hand  5/5 bilaterally hip flex minimal elevation against 

gravity, plantar flex ext 5/5





Current Inpatient Medications








 Medications


  (Trade)  Dose


 Ordered  Sig/Wilman


 Route  Start Time


 Stop Time Status Last Admin


Dose Admin


 


 Acetaminophen


  (Tylenol Tab)  325 mg  Q6H  PRN


 PO  3/20/18 03:15


 4/19/18 03:14  3/20/18 23:29


325 MG


 


 Pantoprazole


 Sodium 40 mg/


 Syringe  10 ml @ 5


 mls/min  DAILY@11


 IV  3/21/18 11:00


 4/20/18 10:59  3/29/18 11:07


5 MLS/MIN


 


 Raspberry


  (Raspberry Syrup


 5ml Cup)  5 ml  QID


 PO  3/21/18 09:30


 4/4/18 09:29  3/29/18 12:02


5 ML


 


 Vancomycin HCl


  (Vancomycin Oral


 Soln)  500 mg  QID


 PO  3/22/18 17:00


 4/5/18 16:59  3/29/18 12:03


500 MG


 


 Hydromorphone HCl


  (Dilaudid Inj)  0.5 mg  Q3H  PRN


 IV  3/23/18 10:45


 4/6/18 10:44  3/27/18 07:56


0.5 MG


 


 Ceftriaxone


 Sodium 2 gm/


 Dextrose  50 ml @ 


 100 mls/hr  Q24H


 IV  3/23/18 18:00


 4/1/18 17:59  3/28/18 17:27


100 MLS/HR


 


 Nystatin


  (Mycostatin Susp)  5 ml  QID


 PO  3/24/18 19:30


 3/31/18 19:29  3/29/18 12:02


5 ML


 


 Ondansetron HCl 6


 mg/Dextrose  53 ml @ 


 200 mls/hr  Q6H  PRN


 IV  3/25/18 00:30


 4/24/18 00:29  3/25/18 07:48


200 MLS/HR


 


 Nitroglycerin


  (Nitroglycerin


 2% Oint)  1 inch  Q6H


 EXT  3/26/18 10:00


 4/25/18 09:59  3/29/18 10:00


1 INCH


 


 Miscellaneous


 Information


  (Consult


 Glycemic


 Management


 Pharmacy)  1 ea  UD  PRN


 N/A  3/27/18 09:32


 4/26/18 09:31   


 


 


 Glucose


  (Glucose 40% Gel)  15-30


 GRAMS 15


 GRAMS...  UD  PRN


 PO  3/27/18 20:00


 4/26/18 19:59   


 


 


 Glucose


  (Glucose Chew


 Tab)  4-8


 Tablets 4


 Tabl...  UD  PRN


 PO  3/27/18 20:00


 4/26/18 19:59   


 


 


 Dextrose


  (Dextrose 50%


 50ML Syringe)  25-50ML OF


 50% DW IV


 FOR...  UD  PRN


 IV  3/27/18 20:00


 4/26/18 19:59   


 


 


 Glucagon


  (Glucagon Inj)  1 mg  UD  PRN


 SQ  3/27/18 20:00


 4/26/18 19:59   


 


 


 Heparin Sodium/


 Dextrose  500 ml @ 


 11 mls/hr  Q24H


 IV  3/27/18 23:30


 4/26/18 23:29  3/29/18 08:31


11 MLS/HR


 


 Acetaminophen 650


 mg/Empty Bag  65 ml @ 


 260 mls/hr  Q6H  PRN


 IV  3/27/18 23:45


 4/26/18 23:44   


 


 


 Ipratropium


 Bromide


  (Atrovent 0.02%


 0.5MG/2.5ML Neb)  0.5 mg  Q4H  PRN


 INH  3/28/18 00:15


 4/27/18 00:14   


 


 


 Levalbuterol


  (Xopenex 1.25MG/


 0.5ML Neb)  1.25 mg  Q4H  PRN


 INH  3/28/18 00:15


 4/27/18 00:14   


 


 


 Ioversol


  (Optiray 320)  100 ml  UD  PRN


 IV  3/28/18 08:45


 4/1/18 08:44   


 


 


 Insulin Glargine


  (Lantus Solostar


 Pen)  SEE


 PROTOCOL


 TEXT  HS


 SC  3/28/18 21:00


 4/27/18 20:59   


 


 


 Metoprolol


 Tartrate


  (Lopressor Tab)  25 mg  Q6


 PO  3/28/18 18:00


 4/23/18 20:59  3/29/18 12:02


25 MG


 


 Insulin Aspart


  (novoLOG ASPART)  SLIDING


 SCALE  ACHS


 SC  3/29/18 07:00


 4/28/18 06:59  3/29/18 12:01


2 UNITS


 


 Hydralazine HCl


  (Apresoline Tab)  12.5 mg  TID


 PO  3/29/18 14:00


 4/28/18 13:59   


 











Impression


62 year old female with complex medical issues -confusion





Plan


1. medical management per primary team and ICU


2. EEG no clear epileptic activity increased slowing on left 


3. metabolic encephalopathy - much improved today


4. currently on heparin gtt due to new infarct on CT head


5. taking orals but not good appetite may need supplemental nutrition


6. repeat MRI brain without contrast to identify and further areas of infarct- 

multiple infarcts


7. if she continues to be alert and cooperative would transition back to orals.


8. PT/OT for discharge needs








I have seen and discussed above patient with Dr Fernando Sow , neurology





Much improved minor dysartria and some clumsiness of hands which wouls be 

expected with the multiple embolic infarcts in the cerebellar hemispheres and 

probable small events in deep left hemisphere which are less evident and likely 

older will follow up tomorrow but for now we do not hav alot more to offer 

neurologically other than recommendations to continue anticoagulants  Fernando Sow MD

## 2018-03-30 VITALS
DIASTOLIC BLOOD PRESSURE: 90 MMHG | SYSTOLIC BLOOD PRESSURE: 167 MMHG | HEART RATE: 66 BPM | TEMPERATURE: 98.42 F | OXYGEN SATURATION: 95 %

## 2018-03-30 VITALS
HEART RATE: 61 BPM | DIASTOLIC BLOOD PRESSURE: 91 MMHG | SYSTOLIC BLOOD PRESSURE: 166 MMHG | TEMPERATURE: 97.88 F | OXYGEN SATURATION: 95 %

## 2018-03-30 VITALS
HEART RATE: 70 BPM | OXYGEN SATURATION: 95 % | SYSTOLIC BLOOD PRESSURE: 162 MMHG | TEMPERATURE: 98.6 F | DIASTOLIC BLOOD PRESSURE: 94 MMHG

## 2018-03-30 VITALS — OXYGEN SATURATION: 98 % | HEART RATE: 56 BPM | DIASTOLIC BLOOD PRESSURE: 85 MMHG | SYSTOLIC BLOOD PRESSURE: 152 MMHG

## 2018-03-30 VITALS
DIASTOLIC BLOOD PRESSURE: 96 MMHG | OXYGEN SATURATION: 95 % | HEART RATE: 73 BPM | TEMPERATURE: 98.78 F | SYSTOLIC BLOOD PRESSURE: 165 MMHG

## 2018-03-30 VITALS — OXYGEN SATURATION: 95 %

## 2018-03-30 VITALS
HEART RATE: 64 BPM | DIASTOLIC BLOOD PRESSURE: 94 MMHG | OXYGEN SATURATION: 97 % | SYSTOLIC BLOOD PRESSURE: 172 MMHG | TEMPERATURE: 98.24 F

## 2018-03-30 VITALS
OXYGEN SATURATION: 95 % | SYSTOLIC BLOOD PRESSURE: 163 MMHG | TEMPERATURE: 98.24 F | DIASTOLIC BLOOD PRESSURE: 88 MMHG | HEART RATE: 70 BPM

## 2018-03-30 VITALS — DIASTOLIC BLOOD PRESSURE: 95 MMHG | HEART RATE: 70 BPM | SYSTOLIC BLOOD PRESSURE: 168 MMHG

## 2018-03-30 LAB
BASOPHILS # BLD: 0.03 K/UL (ref 0–0.2)
BASOPHILS NFR BLD: 0.2 %
BUN SERPL-MCNC: 19 MG/DL (ref 7–18)
CALCIUM SERPL-MCNC: 8.4 MG/DL (ref 8.5–10.1)
CO2 SERPL-SCNC: 28 MMOL/L (ref 21–32)
CREAT SERPL-MCNC: 0.71 MG/DL (ref 0.6–1.2)
EOS ABS #: 0.18 K/UL (ref 0–0.5)
EOSINOPHIL NFR BLD AUTO: 624 K/UL (ref 130–400)
GLUCOSE SERPL-MCNC: 174 MG/DL (ref 70–99)
HCT VFR BLD CALC: 36.3 % (ref 37–47)
HGB BLD-MCNC: 12.1 G/DL (ref 12–16)
IG#: 0.11 K/UL (ref 0–0.02)
IMM GRANULOCYTES NFR BLD AUTO: 26 %
LYMPHOCYTES # BLD: 4.19 K/UL (ref 1.2–3.4)
MCH RBC QN AUTO: 27.3 PG (ref 25–34)
MCHC RBC AUTO-ENTMCNC: 33.3 G/DL (ref 32–36)
MCV RBC AUTO: 81.9 FL (ref 80–100)
MONO ABS #: 1.21 K/UL (ref 0.11–0.59)
MONOCYTES NFR BLD: 7.5 %
NEUT ABS #: 10.37 K/UL (ref 1.4–6.5)
NEUTROPHILS # BLD AUTO: 1.1 %
NEUTROPHILS NFR BLD AUTO: 64.5 %
PMV BLD AUTO: 10.9 FL (ref 7.4–10.4)
POTASSIUM SERPL-SCNC: 3.6 MMOL/L (ref 3.5–5.1)
PTT PATIENT: 42.7 SECONDS (ref 21–31)
RED CELL DISTRIBUTION WIDTH CV: 14.4 % (ref 11.5–14.5)
RED CELL DISTRIBUTION WIDTH SD: 42.5 FL (ref 36.4–46.3)
SODIUM SERPL-SCNC: 136 MMOL/L (ref 136–145)
WBC # BLD AUTO: 16.09 K/UL (ref 4.8–10.8)

## 2018-03-30 RX ADMIN — METOPROLOL TARTRATE SCH MG: 25 TABLET, FILM COATED ORAL at 06:37

## 2018-03-30 RX ADMIN — Medication SCH ML: at 21:22

## 2018-03-30 RX ADMIN — VANCOMYCIN HYDROCHLORIDE SCH MG: 1 INJECTION, POWDER, LYOPHILIZED, FOR SOLUTION INTRAVENOUS at 08:55

## 2018-03-30 RX ADMIN — INSULIN ASPART SCH UNITS: 100 INJECTION, SOLUTION INTRAVENOUS; SUBCUTANEOUS at 21:30

## 2018-03-30 RX ADMIN — NITROGLYCERIN SCH INCH: 20 OINTMENT TOPICAL at 10:00

## 2018-03-30 RX ADMIN — NITROGLYCERIN SCH INCH: 20 OINTMENT TOPICAL at 21:23

## 2018-03-30 RX ADMIN — HEPARIN SODIUM SCH MLS/HR: 5000 INJECTION, SOLUTION INTRAVENOUS at 18:37

## 2018-03-30 RX ADMIN — Medication SCH ML: at 16:40

## 2018-03-30 RX ADMIN — METOPROLOL TARTRATE SCH MG: 25 TABLET, FILM COATED ORAL at 18:38

## 2018-03-30 RX ADMIN — NYSTATIN SCH ML: 100000 SUSPENSION ORAL at 08:53

## 2018-03-30 RX ADMIN — Medication SCH ML: at 12:12

## 2018-03-30 RX ADMIN — CEFTRIAXONE SODIUM SCH MLS/HR: 1 INJECTION, POWDER, FOR SOLUTION INTRAVENOUS at 18:37

## 2018-03-30 RX ADMIN — INSULIN ASPART SCH UNITS: 100 INJECTION, SOLUTION INTRAVENOUS; SUBCUTANEOUS at 08:49

## 2018-03-30 RX ADMIN — NYSTATIN SCH ML: 100000 SUSPENSION ORAL at 21:23

## 2018-03-30 RX ADMIN — INSULIN GLARGINE SCH UNITS: 100 INJECTION, SOLUTION SUBCUTANEOUS at 21:30

## 2018-03-30 RX ADMIN — NITROGLYCERIN SCH INCH: 20 OINTMENT TOPICAL at 16:40

## 2018-03-30 RX ADMIN — VANCOMYCIN HYDROCHLORIDE SCH MG: 1 INJECTION, POWDER, LYOPHILIZED, FOR SOLUTION INTRAVENOUS at 16:42

## 2018-03-30 RX ADMIN — NITROGLYCERIN SCH INCH: 20 OINTMENT TOPICAL at 04:31

## 2018-03-30 RX ADMIN — INSULIN ASPART SCH UNITS: 100 INJECTION, SOLUTION INTRAVENOUS; SUBCUTANEOUS at 16:48

## 2018-03-30 RX ADMIN — METOPROLOL TARTRATE SCH MG: 25 TABLET, FILM COATED ORAL at 12:12

## 2018-03-30 RX ADMIN — VANCOMYCIN HYDROCHLORIDE SCH MG: 1 INJECTION, POWDER, LYOPHILIZED, FOR SOLUTION INTRAVENOUS at 21:22

## 2018-03-30 RX ADMIN — METOPROLOL TARTRATE SCH MG: 25 TABLET, FILM COATED ORAL at 23:15

## 2018-03-30 RX ADMIN — Medication SCH ML: at 09:19

## 2018-03-30 RX ADMIN — PANTOPRAZOLE SODIUM SCH MLS/MIN: 40 INJECTION, POWDER, FOR SOLUTION INTRAVENOUS at 11:00

## 2018-03-30 RX ADMIN — INSULIN ASPART SCH UNITS: 100 INJECTION, SOLUTION INTRAVENOUS; SUBCUTANEOUS at 16:49

## 2018-03-30 RX ADMIN — NYSTATIN SCH ML: 100000 SUSPENSION ORAL at 16:40

## 2018-03-30 RX ADMIN — VANCOMYCIN HYDROCHLORIDE SCH MG: 1 INJECTION, POWDER, LYOPHILIZED, FOR SOLUTION INTRAVENOUS at 12:12

## 2018-03-30 RX ADMIN — NYSTATIN SCH ML: 100000 SUSPENSION ORAL at 12:12

## 2018-03-30 NOTE — SURGICAL CONSULTATION
DATE OF CONSULTATION:  03/30/2018

 

REASON FOR CONSULTATION:  Bilateral pleural effusions.

 

HISTORY OF PRESENT ILLNESS:  Kat Calixto is a 62-year-old [place] born

female who has been hospitalized since 03/20/2018 with sepsis of unknown

etiology.  It should be noted she grew E. coli in her blood from cultures

done on 03/19/2018 late in the evening.  There has been no growth most

recently.  She was extremely ill with her septic onset and had renal failure;

however, this all improved.  Her kidney function has normalized.  The reason

I was asked to see her is because she has bilateral pleural effusions, right

larger than left.  I discussed this with Dr. Volodymyr Pandya and he did feel

from a cardiology standpoint she would be improved if we were to perform a

thoracentesis.  She had one episode of paroxysmal atrial fibrillation, has

been anticoagulated since that time.  She is currently on a heparin drip.

 

I discussed this in detail with Dr. Pandya as well as the patient.  I think a

thoracentesis would be in order.  I reviewed her x-ray and she has more fluid

on the right.  We will do an ultrasound in the morning, but I have offered

her a right thoracentesis tomorrow.  We will stop her heparin early in the

morning to try to mitigate the chances of bleeding.

 

Thank you very much.

## 2018-03-30 NOTE — NEUROLOGY PROGRESS NOTES
Neurology Progress Note


Date of Service


Mar 30, 2018.





Augusta Stephens is a 62 year old female who presented to the  ED with c/o L leg pain 

and swelling. She was also having  generalized body ache symptoms, nausea, 

lower abd pain and was febrile, L leg suspected to have cellulitis. Her blood 

culture is growing gram negative bacilli. She was started on  Cefepime, Flagyl, 

Doxycycline. She had imaging studies including CT chest/abd/pelvis which is 

concerning for R heart congestion, pulmonary HTN, anasarca, abd ascites.  CT 

hypoperfusion complex suspected and  enlarged retroperitoneal and pelvic lymph 

nodes unclear etiology and may be related to congestive change or malignancy/

lymphoproliferative disease. She moved several years ago to [place] and 

lives with daughter. She used to be a [place] resident but lost insurance and hasn't 

had medical care for years. 


Today she is sitting up states she is doing ok. denies CP, SOB, abdominal pain, 

+some LE weakness





Objective











  Date Time  Temp Pulse Resp B/P (MAP) Pulse Ox O2 Delivery O2 Flow Rate FiO2


 


3/30/18 12:00      Room Air  


 


3/30/18 11:08 37.0 70 22 162/94 (116) 95   


 


3/30/18 08:00      Room Air  


 


3/30/18 07:49 37.1 73 22 165/96 (119) 95   


 


3/30/18 06:35  70  168/95 (119)    


 


3/30/18 04:00      Room Air  


 


3/30/18 02:45 36.9 66 27 167/90 (115) 95 Room Air  


 


3/29/18 23:59      Room Air  


 


3/29/18 23:17 36.5 63 21 146/90 (108) 94 Room Air  


 


3/29/18 20:32 36.4 62 22 122/69 (86) 94 Room Air  


 


3/29/18 20:00      Room Air  


 


3/29/18 16:29 36.4 70 24 128/88 (101) 94 Room Air  


 


3/29/18 16:00     92 Room Air  











Last 24 Hours








Test


  3/29/18


16:31 3/29/18


20:35 3/30/18


05:15 3/30/18


06:35


 


Bedside Glucose 164 mg/dl  151 mg/dl   143 mg/dl 


 


White Blood Count   16.09 K/uL  


 


Red Blood Count   4.43 M/uL  


 


Hemoglobin   12.1 g/dL  


 


Hematocrit   36.3 %  


 


Mean Corpuscular Volume   81.9 fL  


 


Mean Corpuscular Hemoglobin   27.3 pg  


 


Mean Corpuscular Hemoglobin


Concent 


  


  33.3 g/dl 


  


 


 


Platelet Count   624 K/uL  


 


Mean Platelet Volume   10.9 fL  


 


Neutrophils (%) (Auto)   64.5 %  


 


Lymphocytes (%) (Auto)   26.0 %  


 


Monocytes (%) (Auto)   7.5 %  


 


Eosinophils (%) (Auto)   1.1 %  


 


Basophils (%) (Auto)   0.2 %  


 


Neutrophils # (Auto)   10.37 K/uL  


 


Lymphocytes # (Auto)   4.19 K/uL  


 


Monocytes # (Auto)   1.21 K/uL  


 


Eosinophils # (Auto)   0.18 K/uL  


 


Basophils # (Auto)   0.03 K/uL  


 


RDW Standard Deviation   42.5 fL  


 


RDW Coefficient of Variation   14.4 %  


 


Immature Granulocyte % (Auto)   0.7 %  


 


Immature Granulocyte # (Auto)   0.11 K/uL  


 


Activated Partial


Thromboplast Time 


  


  42.7 SECONDS 


  


 


 


Partial Thromboplastin Ratio   1.6  


 


Sodium Level   136 mmol/L  


 


Potassium Level   3.6 mmol/L  


 


Chloride Level   103 mmol/L  


 


Carbon Dioxide Level   28 mmol/L  


 


Anion Gap   5.0 mmol/L  


 


Blood Urea Nitrogen   19 mg/dl  


 


Creatinine   0.71 mg/dl  


 


Est Creatinine Clear Calc


Drug Dose 


  


  70.1 ml/min 


  


 


 


Estimated GFR (


American) 


  


  107.3 


  


 


 


Estimated GFR (Non-


American 


  


  92.6 


  


 


 


BUN/Creatinine Ratio   26.3  


 


Random Glucose   174 mg/dl  


 


Uric Acid   5.1 mg/dl  


 


Calcium Level   8.4 mg/dl  


 


Test


  3/30/18


11:16 


  


  


 


 


Bedside Glucose 154 mg/dl    








Imaging:


no new imaging


Exam:


gen: alert and oriented x 3


lungs course breath sounds


CV RRR


biceps triceps hand  5/5 bilaterally, lifts bilaterally legs against 

gravity and plantar flex ext 4+/5 bilaterally


skin: heel break down bilaterally


oriented to person , [facility] , 2018





Current Inpatient Medications








 Medications


  (Trade)  Dose


 Ordered  Sig/Wilman


 Route  Start Time


 Stop Time Status Last Admin


Dose Admin


 


 Acetaminophen


  (Tylenol Tab)  325 mg  Q6H  PRN


 PO  3/20/18 03:15


 4/19/18 03:14  3/20/18 23:29


325 MG


 


 Pantoprazole


 Sodium 40 mg/


 Syringe  10 ml @ 5


 mls/min  DAILY@11


 IV  3/21/18 11:00


 4/20/18 10:59  3/30/18 11:00


5 MLS/MIN


 


 Vancomycin HCl


  (Vancomycin Oral


 Soln)  500 mg  QID


 PO  3/22/18 17:00


 4/5/18 16:59  3/30/18 12:12


500 MG


 


 Hydromorphone HCl


  (Dilaudid Inj)  0.5 mg  Q3H  PRN


 IV  3/23/18 10:45


 4/6/18 10:44  3/27/18 07:56


0.5 MG


 


 Ceftriaxone


 Sodium 2 gm/


 Dextrose  50 ml @ 


 100 mls/hr  Q24H


 IV  3/23/18 18:00


 4/1/18 17:59  3/29/18 17:29


100 MLS/HR


 


 Nystatin


  (Mycostatin Susp)  5 ml  QID


 PO  3/24/18 19:30


 3/31/18 19:29  3/30/18 12:12


5 ML


 


 Ondansetron HCl 6


 mg/Dextrose  53 ml @ 


 200 mls/hr  Q6H  PRN


 IV  3/25/18 00:30


 4/24/18 00:29  3/25/18 07:48


200 MLS/HR


 


 Nitroglycerin


  (Nitroglycerin


 2% Oint)  1 inch  Q6H


 EXT  3/26/18 10:00


 4/25/18 09:59  3/30/18 10:00


1 INCH


 


 Miscellaneous


 Information


  (Consult


 Glycemic


 Management


 Pharmacy)  1 ea  UD  PRN


 N/A  3/27/18 09:32


 4/26/18 09:31   


 


 


 Glucose


  (Glucose 40% Gel)  15-30


 GRAMS 15


 GRAMS...  UD  PRN


 PO  3/27/18 20:00


 4/26/18 19:59   


 


 


 Glucose


  (Glucose Chew


 Tab)  4-8


 Tablets 4


 Tabl...  UD  PRN


 PO  3/27/18 20:00


 4/26/18 19:59   


 


 


 Dextrose


  (Dextrose 50%


 50ML Syringe)  25-50ML OF


 50% DW IV


 FOR...  UD  PRN


 IV  3/27/18 20:00


 4/26/18 19:59   


 


 


 Glucagon


  (Glucagon Inj)  1 mg  UD  PRN


 SQ  3/27/18 20:00


 4/26/18 19:59   


 


 


 Heparin Sodium/


 Dextrose  500 ml @ 


 12 mls/hr  Q24H


 IV  3/27/18 23:30


 4/26/18 23:29  3/29/18 08:31


11 MLS/HR


 


 Acetaminophen 650


 mg/Empty Bag  65 ml @ 


 260 mls/hr  Q6H  PRN


 IV  3/27/18 23:45


 4/26/18 23:44   


 


 


 Ipratropium


 Bromide


  (Atrovent 0.02%


 0.5MG/2.5ML Neb)  0.5 mg  Q4H  PRN


 INH  3/28/18 00:15


 4/27/18 00:14   


 


 


 Levalbuterol


  (Xopenex 1.25MG/


 0.5ML Neb)  1.25 mg  Q4H  PRN


 INH  3/28/18 00:15


 4/27/18 00:14   


 


 


 Ioversol


  (Optiray 320)  100 ml  UD  PRN


 IV  3/28/18 08:45


 4/1/18 08:44   


 


 


 Metoprolol


 Tartrate


  (Lopressor Tab)  25 mg  Q6


 PO  3/28/18 18:00


 4/23/18 20:59  3/30/18 12:12


25 MG


 


 Insulin Aspart


  (novoLOG ASPART)  SLIDING


 SCALE  ACHS


 SC  3/29/18 07:00


 4/28/18 06:59  3/30/18 08:49


3 UNITS


 


 Hydralazine HCl


  (Apresoline Tab)  25 mg  TID


 PO  3/30/18 09:00


 4/28/18 13:59  3/30/18 08:53


25 MG


 


 Raspberry


  (Raspberry Syrup


 5ml Cup)  2.5 ml  QID


 PO  3/30/18 09:00


 4/13/18 08:59  3/30/18 12:12


2.5 ML


 


 Insulin Glargine


  (Lantus Solostar


 Pen)  SEE


 PROTOCOL


 TEXT  BID


 SC  3/30/18 21:00


 4/29/18 20:59   


 











Impression


62 year old female with complex medical issues -confusion





Plan


1. medical management per primary team and ICU


2. EEG no clear epileptic activity increased slowing on left 


3. metabolic encephalopathy - much improved today


4. currently on heparin gtt due to new infarct on CT head


5. taking orals but not good appetite may need supplemental nutrition


6. MRI brain without contrast to identify and further areas of infarct- 

multiple infarcts


7. if she continues to be alert and cooperative would transition back to orals.


8. PT/OT for discharge needs


will sign off for now will be available for questions concerns as needed














I have seen and discussed above patient with Dr Fernando Sow , neurology





Remarkable turnaround alert conversant no clear signs of the posterior 

circulation and deep left brain embolic shower starting oral meds so hopefully 

she can get back on the oral novel anticoagulation protocol soon and the 

duration of this will have to be decided by her internists and cardiology  we 

are going to sign off for now but of course can revisit as needed if things 

change  Fernando Sow MD

## 2018-03-30 NOTE — MEDICAL CONSULT
Consultation Note


Date of Service


Mar 30, 2018.





Consultation Note


Consult Dictated #[id number]

## 2018-03-30 NOTE — CONSULTATION REPORT
DATE OF CONSULTATION:  2018

 

HISTORY OF PRESENT ILLNESS:  Pleural effusions.

 

HISTORY OF PRESENT ILLNESS:  This is a 62-year-old female who is visiting her

daughter in [place], who formally resides in [facility].  The

patient has been admitted to [facility] since  of

this year and has undergone a very complicated hospital course.

 

The patient was admitted due to nausea, vomiting and generalized abdominal

pain along with leg pain, although she denied any diarrhea at the time of

admission.  The patient was felt to be septic, so the patient was started on

broad spectrum antibiotics and admitted to the hospital.  During the course

of the patient's hospital course, she has had blood cultures that were drawn

initially on the evening of , the evening prior to admission and 2

out of 2 blood cultures were positive for E. coli, resistant only to

ampicillin.  In addition, she did have development of diarrhea, where the

patient had a stool sent for C. diff, which was noted to be positive for C.

diff.  A nasal MRSA swab is noted to be negative.  It is noteworthy to

mention that she has had repeat blood cultures on , which were

negative for growth and urine culture has been sent on 2 occasions and both

are negative at the time of this dictation.

 

During the patient's postoperative course, she did develop atrial

fibrillation with rapid ventricular response.  She was seen by cardiology and

the patient underwent an echocardiogram that did show slightly diminished

ejection fraction with an ejection fraction of 40%.  Moderate global

hypokinesis of left ventricle was noted.  No mitral stenosis or mitral

regurgitation was noted.  There is no significant aortic stenosis or aortic

regurgitation.  Cardiac catheterization was undertaken by cardiology and the

patient was noted to have single vessel coronary artery disease with an 80%

proximal lesion of an obtuse marginal artery.  Because of her underlying

cardiac issues, she has been recommended to begin oral anticoagulation, but

this is on hold as the patient is currently on a heparin drip.  The patient

did have acute kidney injury during her hospital course, which was felt to be

due to her underlying sepsis and this had markedly improved since her

admission.  The patient was also noted to have metabolic encephalopathy,

which is felt to be due to a combination of narcotic pain medications,

sepsis, episodes of hypotension along with concern for bilateral cerebellar

hemisphere infarcts.

 

Imaging performed during the course of the patient's hospital course included

numerous CAT scans and x-rays.  The patient has had abdominal CAT scans,

which did show the base of the patient's lungs shown that the patient had

bilateral pleural effusions.  These pleural effusions persisted on chest

x-rays and for this reason, we are asked to see the patient.

 

I did visit with the patient at the bedside and at the present time, she says

she feels somewhat stronger than when she initially arrived in the hospital. 

She does not report any history of falls or head injuries.  She denies any

blurry or double vision.  No tinnitus, sore throat or neck pain is noted.  At

the present time, she denies any chest pain or palpitations.  She says that

she has some slight shortness of breath, but is comfortable while she is

resting in her bed.  At the present time, she does not report any nausea,

vomiting or abdominal pain, but says that she is having some frequent loose

bowel movements.  Currently, she has a Linares catheter in place and so does

not report any dysuria.  She denies any history of DVT or PE.

 

Her most recent labs were from today, which showed sodium, potassium and

creatinine, which were all within the normal range.  A CBC today revealed a

white blood cell count of 16.0 with a hemoglobin and hematocrit of 12.1 and

36.3 and platelet count of 624,000.  Chest x-ray performed on , which

was yesterday showed moderate bilateral pleural effusions.

 

PAST MEDICAL HISTORY:  Prior to this admission was significant only for

anemia.

 

PAST SURGICAL HISTORY:  She denies prior surgeries prior to this admission.

 

ALLERGIES:  SHE HAS LISTED ALLERGIES TO SULFA.

 

INPATIENT MEDICATIONS:  Include the followin.  Lantus insulin per protocol.

2.  Hydralazine 25 mg 3 times daily.

3.  Sliding scale NovoLog insulin.

4.  Lopressor 25 mg every 6 hours.

5.  Atrovent and Xopenex nebulizers as needed.

6.  Tylenol as needed.

7.  Heparin drip.

8.  Rocephin 2 grams daily.

9.  Vancomycin 500 mg orally 4 times daily.

10.  Protonix 40 mg daily.

 

SOCIAL HISTORY:  The patient is a nonsmoker.  She originally resides in

[place], but currently lives in [facility].

 

FAMILY HISTORY:  Positive for hypertension.

 

REVIEW OF SYSTEMS:  As noted above.

 

PHYSICAL EXAMINATION:

VITAL SIGNS:  The patient is afebrile with temperature 37.0, pulse is 70 and

regular, respirations are 22 and nonlabored, blood pressure is 162/94, and

pulse ox 95% on room air.

SKIN:  Warm with good turgor.

GENERAL:  She is alert to time, place and person at the time of my exam.  She

is in no distress.

HEENT:  Head is atraumatic and normocephalic.  EYES:  Pupils equal, round,

and reactive to light and accommodation.  Extraocular motions are intact. 

EARS:  Auditory acuity is grossly intact.  NOSE:  Nasal patency was intact. 

Sinuses are nontender.  Mouth is moist without exudates.

NECK:  Supple.  No JVD is noted.

CARDIOVASCULAR:  Regular rate and rhythm.

LUNGS:  The patient's lungs revealed decreased breath sounds at the bases

bilaterally.  She was not using accessory muscles and no signs of respiratory

distress were noted.

ABDOMEN:  Soft and nontender.

EXTREMITIES:  Revealed no cyanosis, clubbing, or edema.  The patient did have

some tenderness with palpation over the shins of her extremities with the

left being greater than the right.  She did have palpable radial and DP

pulses bilaterally.

NEUROLOGIC:  Revealed cranial nerves II through XII are grossly intact.  No

focal deficits are noted.

 

DIAGNOSTIC DATA:  As noted above.

 

IMPRESSION:  A 62-year-old female with bilateral pleural effusions.

 

PLAN:  We will tentatively plan on performing a thoracentesis for diagnostic

as well as therapeutic purposes.  This will have to be coordinated with

cessation of the patient's heparin drip as ideally we would like to stop the

heparin drip for several hours prior to performing a thoracentesis in order

to minimize chance of the patient having any bleeding complications.  We will

determine the timing of the patient's thoracentesis and then make

recommendations as when to stop the heparin drip.

 

The above was discussed with the patient as well as her nurse.  Further

recommendations will be forthcoming as the clinical course unfolds.

## 2018-03-30 NOTE — CARDIOLOGY PROGRESS NOTE
DATE: 03/30/2018

 

The patient seen and examined.  Chart, medications, telemetry reviewed.

 

SUBJECTIVE:  The patient appears substantially brighter today, answering

questions appropriately.

 

OBJECTIVE:

VITAL SIGNS:  Heart rate 73, blood pressure 165/96.

NECK:  Thin.  There is no distinct jugular venous distention.

LUNGS:  Reveal diminished breath sounds bibasilar.

CARDIOVASCULAR:  Regular.  There is no S3 gallop.

ABDOMEN:  Soft, nontender.

EXTREMITIES:  Reveal 1+ lower extremity edema.

 

DATA:  Telemetry reveals no further atrial arrhythmias.  Chest x-ray on

03/29/2018 demonstrated bilateral pleural effusions.  White cell count today

is down to 16.0, hemoglobin 12.1.  Sodium is 136, potassium 3.6, chloride is

103, bicarb is 28, BUN is 19, creatinine is 0.7.

 

IMPRESSION:  A 62-year-old female admitted with complex history of acute

Escherichia coli and Clostridium difficile sepsis complicated by acute renal

insufficiency, paroxysmal atrial arrhythmias.  The patient once again

demonstrating clinical improvement.  I agree with titration upward of

hydralazine for hypertension control.  We will switch nitrates to oral.  The

patient is able to take medications.  The patient is being evaluated for

thoracentesis appropriately.  Intermittent diuretic dosing or addition of

low-dose furosemide may be warranted in the future.  Would resume oral

anticoagulation once decision regarding thoracentesis has been made.

## 2018-03-30 NOTE — PHARMACY PROGRESS NOTE
Pharmacy Glycemic Short Note 2


Date of Service


Mar 30, 2018.





OUTPATIENT ANTIDIABETIC REGIMEN: 


* n/a











ASSESSMENT:


* Ms Calixto is a 63 y/o F with no significant PMH who presented with abdominal 

pain and lower extremity swelling. She has had a complicated hospitalization 

with Afib with RVR, development of severe Clostridium difficile infection, E 

coli bacteremia on Rocephin.


* Yesterday the patient's blood sugars were [phone] mg/dL ... she 

received 5 units of bolus insulin. Fasting today is 143 mg/dL which is up 

signficiantly from yesterday's fasting blood sugar. Gave Lantus weight-based 

stress of 1 which was 5 units and established twice daily scale. Tightened 

scale slightly as the patient's blood sugar increased throughout the day. 














PLAN FOR INPATIENT GLYCEMIC CONTROL:


* Basal insulin


 * Lantus 0-10 units SQ BID (0 units if blood sugar less than 120 mg/dL; 5 

units if blood sugar 120-180 mg/dl; 10 units if blood sugar greater tahn 180 mg/

dL)





* Bolus insulin


 * NovoLog per scale ACHS or Q6hrs while NPO


 * Goal Range:  Low 140 mg/dL - High 180 mg/dL


 * Correction Factor:  30 mg/dL/unit


 * Nutritional / Prandial insulin per carb ratio of  1 unit per 10 grams CHO 

consumed








OUTPATIENT RECOMMENDATIONS


* Recommend starting oral therapy for patient as an outpatient. First line 

therapies include metformin, SGLT-2 inhibitors, and injectables such as 

Victoza. Patient may also try diet modification and exercise.

## 2018-03-30 NOTE — SURGERY PROGRESS NOTE
Surgery Progress Note


Date of Service


Mar 30, 2018.





Subjective


Post OP Day:  HD#10


Feeling okay


Eating breakfast on her own


No abdominal pain but some "abdominal irritation with antibiotics"


still having loose stools





Objective


Vital Signs:











  Date Time  Temp Pulse Resp B/P (MAP) Pulse Ox O2 Delivery O2 Flow Rate FiO2


 


3/30/18 07:49 37.1 73 22 165/96 (119) 95   


 


3/30/18 06:35  70  168/95 (119)    


 


3/30/18 04:00      Room Air  


 


3/30/18 02:45 36.9 66 27 167/90 (115) 95 Room Air  


 


3/29/18 23:59      Room Air  


 


3/29/18 23:17 36.5 63 21 146/90 (108) 94 Room Air  


 


3/29/18 20:32 36.4 62 22 122/69 (86) 94 Room Air  


 


3/29/18 20:00      Room Air  


 


3/29/18 16:29 36.4 70 24 128/88 (101) 94 Room Air  


 


3/29/18 16:00     92 Room Air  


 


3/29/18 12:00     92 Room Air  


 


3/29/18 11:46 36.6 75 16 162/81 (108) 93 Room Air  








General Appearance:  WD/WN, no apparent distress


Head:  normocephalic, atraumatic


Neck:  trachea midline


Respiratory/Chest:  no respiratory distress, no accessory muscle use


Abdomen:  non tender, non distended, soft, no organomegaly, no pulsatile mass


Laboratory Results:





Results Past 24 Hours








Test


  3/29/18


11:04 3/29/18


12:57 3/29/18


14:10 3/29/18


16:31 Range/Units


 


 


Bedside Glucose 142   164 70-90  mg/dl


 


Activated Partial


Thromboplast Time 


  52.6


  


  


  21.0-31.0


SECONDS


 


Partial Thromboplastin Ratio  2.0    


 


Urine Color   DK YELLOW   


 


Urine Appearance   CLEAR  CLEAR  


 


Urine pH   6.0  4.5-7.5  


 


Urine Specific Gravity   1.026  1.000-1.030  


 


Urine Protein   NEG  NEG  


 


Urine Glucose (UA)   NEG  NEG  


 


Urine Ketones   NEG  NEG  


 


Urine Occult Blood   NEG  NEG  


 


Urine Nitrite   POS  NEG  


 


Urine Bilirubin   NEG  NEG  


 


Urine Urobilinogen   NEG  NEG  


 


Urine Leukocyte Esterase   TRACE  NEG  


 


Urine WBC (Auto)   5-10  0-5  /hpf


 


Urine RBC (Auto)   5-10  0-4  /hpf


 


Urine Hyaline Casts (Auto)   5-10  0-5  /lpf


 


Urine Epithelial Cells (Auto)   20-30  0-5  /lpf


 


Urine Bacteria (Auto)   NEG  NEG  


 


Urine Crystals   TALC  NONE PRSENT  


 


Urine Yeast (Auto)   BUDDING  NONE PRSENT  


 


Test


  3/29/18


20:35 3/30/18


05:15 3/30/18


06:35 


  Range/Units


 


 


Bedside Glucose 151  143  70-90  mg/dl


 


White Blood Count  16.09   4.8-10.8  K/uL


 


Red Blood Count  4.43   4.2-5.4  M/uL


 


Hemoglobin  12.1   12.0-16.0  g/dL


 


Hematocrit  36.3   37-47  %


 


Mean Corpuscular Volume  81.9     fL


 


Mean Corpuscular Hemoglobin  27.3   25-34  pg


 


Mean Corpuscular Hemoglobin


Concent 


  33.3


  


  


  32-36  g/dl


 


 


Platelet Count  624   130-400  K/uL


 


Mean Platelet Volume  10.9   7.4-10.4  fL


 


Neutrophils (%) (Auto)  64.5    %


 


Lymphocytes (%) (Auto)  26.0    %


 


Monocytes (%) (Auto)  7.5    %


 


Eosinophils (%) (Auto)  1.1    %


 


Basophils (%) (Auto)  0.2    %


 


Neutrophils # (Auto)  10.37   1.4-6.5  K/uL


 


Lymphocytes # (Auto)  4.19   1.2-3.4  K/uL


 


Monocytes # (Auto)  1.21   0.11-0.59  K/uL


 


Eosinophils # (Auto)  0.18   0-0.5  K/uL


 


Basophils # (Auto)  0.03   0-0.2  K/uL


 


RDW Standard Deviation  42.5   36.4-46.3  fL


 


RDW Coefficient of Variation  14.4   11.5-14.5  %


 


Immature Granulocyte % (Auto)  0.7    %


 


Immature Granulocyte # (Auto)  0.11   0.00-0.02  K/uL


 


Activated Partial


Thromboplast Time 


  42.7


  


  


  21.0-31.0


SECONDS


 


Partial Thromboplastin Ratio  1.6    


 


Sodium Level  136   136-145  mmol/L


 


Potassium Level  3.6   3.5-5.1  mmol/L


 


Chloride Level  103     mmol/L


 


Carbon Dioxide Level  28   21-32  mmol/L


 


Anion Gap  5.0   3-11  mmol/L


 


Blood Urea Nitrogen  19   7-18  mg/dl


 


Creatinine


  


  0.71


  


  


  0.60-1.20


mg/dl


 


Est Creatinine Clear Calc


Drug Dose 


  70.1


  


  


   ml/min


 


 


Estimated GFR (


American) 


  107.3


  


  


   


 


 


Estimated GFR (Non-


American 


  92.6


  


  


   


 


 


BUN/Creatinine Ratio  26.3   10-20  


 


Random Glucose  174   70-99  mg/dl


 


Calcium Level  8.4   8.5-10.1  mg/dl








Microbiology Results


3/29/18 Urine Culture, Received


          Pending





Assessment & Plan


C. Diff Colitis


   - improvement of leukocytosis 16K today, afebrile


   - no abdominal pain, distention, or pain on examination


   - H& H stable


   - + bowel function, no blood in stools per nursing staff


   - Repeat CT scan showing no signs of shock bowel





Plan:


No acute surgical indication, benign abdomen


Continue Abx regimen per Infectious disease


Continue current medical management


Continue PT/OT/Speech therapy


Our services signing off





Dr. Anand has seen and examined patient, agrees with above

## 2018-03-30 NOTE — PULMONARY CONSULTATION
DATE OF CONSULTATION:  03/30/2018

 

TIME:  11:00 a.m.

 

REPORT OF CONSULTATION:  The patient was seen in room 201.  She is a

62-year-old female who was admitted to the hospital on the late hours of

March 19th.  Her presenting complaints were left leg pain, abdominal pain,

nausea and vomiting.  She was thought to be septic.  Indeed, she did have

blood cultures positive for E. coli.  She was treated with antibiotics

immediately.  The patient had been looking very acutely ill.  She apparently

had had some associated fevers and chills.  Subsequently, she was found to be

positive for C. diff.  She was given IV fluids as well as antibiotics.  It

was thought that she might have a cellulitis from the left leg.  She was

complaining of pain throughout the left leg area.  She still has some of that

pain.  On March 21st, she developed atrial fibrillation.  She was transferred

to ICU.  She was found to have an ejection fraction decreased to between 35%

and 40% and she had a dilated IVC.  She was also found to have a diastolic

dysfunction level 2.  The patient also developed worsening renal function. 

She has had an episode of confusion.  Subsequent tests have suggested that

she has had a CVA since admission.

 

The patient has had a very extensive course.  She had severe metabolic

acidosis with a blood gas on March 22nd showing a pH of 7.27 with a pCO2 of

28 and a pO2 of 61.  Most of these things have improved.  She underwent a

cardiac catheterization on March 26th.  She was found to have severe

single-vessel coronary artery disease with a decreased left ventricular

systolic function.  She has been followed by Dr. Nelson from the cardiology

division.

 

Consultation is requested for evaluation of bilateral pleural effusions. 

When the patient was first admitted, she did have a CTA of the chest that

showed no significant effusion.  Subsequently, she has developed at least

moderate-sized bilateral pleural effusions.  Surprisingly, she is not very

short of breath, but she has not been active at all.  I do not believe they

had had her walking at all thus far.  She has had significant peripheral

edema, which has improved.  Review of her intakes and outputs for the past

several days shows that she had had a significant urine outputs on certain

days.  For instance on March 23rd, her output was 6475.  On March 24th, the

output was 3900.  For the next few days, she did not have that much urine

output.  On the 28th, the output was 3950.  Her weights have been decreasing

by bed scale analysis.  Nonetheless, the pleural fluids have been still

significant.  The case was discussed with Dr. Wills, who tells me that Dr. Nelson felt that a thoracentesis might be of benefit at this point in time.

 

The patient denies any cough.  She denies chest pains.  She states her

appetite is improving.  Her sense of wellbeing is better.

 

She denies prior pulmonary problems such as asthma, emphysema, tuberculosis,

pneumonia, etc.

 

PAST SURGICAL HISTORY:  None.

 

PAST MEDICAL HISTORY:  Positive for some type of thyroid problem as well as

anemia.

 

ALLERGIES:  LISTED ALLERGY TO SULFA.

 

FAMILY HISTORY:  Listed as positive for hypertension.

 

SOCIAL HISTORY:  The patient does not smoke.  Alcohol use is none.

 

REVIEW OF SYSTEMS:  As per the history of present illness noted above. 

Otherwise, negative.

 

PHYSICAL EXAMINATION:

GENERAL:  The patient is a 62-year-old female who was cooperative, alert and

oriented.

VITAL SIGNS:  She is small in stature and very thin.  Her BMI is 19.9.

HEENT:  Pupils were reactive.  Nares were clear.  Mouth exam shows she has

missing numerous teeth, but apparently she does have a partial plate that is

not currently in.

NECK:  Veins did not appear distended.  No lymph nodes were palpable.

CARDIOVASCULAR:  The cardiac rate is 69 per minute.  The rhythm is regular. 

Blood pressure 162/94.

RESPIRATORY:  Percussion of the chest reveals dullness in both the lower and

mid lung fields posteriorly bilaterally.

LUNGS:  Breath sounds are severely diminished in these areas.  Respiratory

rate was 22 breaths per minute at rest.  It was not labored.  Saturation was

95%.

ABDOMEN:  Soft.  Bowel sounds were present.

EXTREMITIES:  There was no focal tenderness.  No definite mass was palpable.

 

Examination of the left leg reveals some tenderness to palpation in the calf

and thigh regions.  No significant edema was noted.  She had previously had

venous Dopplers done on the left and it was negative.

 

CT angio of the chest on 03/20/2018 showed no evidence of pulmonary embolic

disease.  Pulmonary arterial enlargement was noted suggesting possible

pulmonary hypertension.  The right heart was enlarged.  Anasarca and

periportal edema was reported.

 

CAT scan of the head done on 03/20/2018 showed no acute intracranial

abnormality.  Repeat head CT done on March 27th showed a focal hypodensity in

the right cerebellum/inferior aspect of the right middle cerebellar peduncle.

 She then had a brain MRI done on March 29th that showed interval development

of multiple posterior fossa infarcts involving both cerebellar hemispheres as

well as the left and right middle cerebellar peduncles.

 

CAT scan of the abdomen and pelvis done on March 20th showed enlarged

retroperitoneal and pelvic lymph nodes of uncertain etiology.  Repeat abdomen

and pelvis CT on March 23rd showed small-to-moderate bilateral pleural

effusions with moderate diffuse body wall edema and abdominal pelvic ascites.

 Atelectatic changes were noted at the lung bases where the fluid was

located.  Liver ultrasound was negative.

 

Chest x-ray on March 27th showed moderate-to-large bilateral pleural

effusions.  Chest x-ray done on the 29th looked similar.

 

LABORATORY DATA:  White count today is 16.09.  It had been 23.96 as of March 28th.  Hemoglobin is 12.1.  Platelets are 624,000.  The patient's initial

white count on March 19th was only 4.36.  PTT today is 42.7.  Urinalysis on

admission showed +2 protein.  Urinalysis on March 29th reported budding

yeast.  Electrolytes today show sodium 136, potassium 3.6, chloride 103,

bicarbonate 28.  BUN was 19 with creatinine 0.71.

 

Initial blood culture showed E. coli x2.  Repeat blood cultures on the 22nd

was negative.

 

IMPRESSION:

1.  Bilateral pleural effusions.

2.  Escherichia coli sepsis.

3.  C. diff infection.

4.  Tiny nodule in the right upper lung field, subpleural.

 

COMMENTS AND RECOMMENDATIONS:  The patient is a very complex case.  The

question at hand is whether she would benefit from a thoracentesis.  I have

discussed the case with Dr. Wills, her hospitalist.  She tells me that Dr. Chakraborty thought the patient would benefit from a cardiac perspective.  We did

discuss the pros and cons of doing thoracentesis, currently versus the

patient hopefully having a natural diuresis as she overall clinically

improves.  There is some concern about giving her too much of a diuretic with

her kidney issues, etc.  Dr. Wills said he had discussed the case with the

patient's daughter, who is a physician and she was agreeable to a

thoracentesis if desired.  The patient is on IV heparin and this would need

to be held.  We are going to ask Dr. Vick to see the patient.  I did

discuss the case with him.  He is anticipating doing a thoracentesis perhaps

later today after the heparin is held or tomorrow morning if need be.  The

pleural fluid should be sent for all the appropriate studies.

 

Thank you for asking me to assist in her care.

## 2018-03-31 VITALS
DIASTOLIC BLOOD PRESSURE: 99 MMHG | OXYGEN SATURATION: 96 % | HEART RATE: 68 BPM | SYSTOLIC BLOOD PRESSURE: 195 MMHG | TEMPERATURE: 97.52 F

## 2018-03-31 VITALS — DIASTOLIC BLOOD PRESSURE: 80 MMHG | HEART RATE: 50 BPM | SYSTOLIC BLOOD PRESSURE: 140 MMHG

## 2018-03-31 VITALS
TEMPERATURE: 98.06 F | OXYGEN SATURATION: 94 % | HEART RATE: 65 BPM | DIASTOLIC BLOOD PRESSURE: 83 MMHG | SYSTOLIC BLOOD PRESSURE: 157 MMHG

## 2018-03-31 VITALS
OXYGEN SATURATION: 94 % | DIASTOLIC BLOOD PRESSURE: 73 MMHG | TEMPERATURE: 98.78 F | SYSTOLIC BLOOD PRESSURE: 155 MMHG | HEART RATE: 71 BPM

## 2018-03-31 VITALS
SYSTOLIC BLOOD PRESSURE: 134 MMHG | DIASTOLIC BLOOD PRESSURE: 89 MMHG | HEART RATE: 64 BPM | OXYGEN SATURATION: 96 % | TEMPERATURE: 98.06 F

## 2018-03-31 VITALS
TEMPERATURE: 97.88 F | OXYGEN SATURATION: 94 % | DIASTOLIC BLOOD PRESSURE: 84 MMHG | HEART RATE: 94 BPM | SYSTOLIC BLOOD PRESSURE: 162 MMHG

## 2018-03-31 VITALS
HEART RATE: 64 BPM | OXYGEN SATURATION: 96 % | TEMPERATURE: 98.06 F | DIASTOLIC BLOOD PRESSURE: 89 MMHG | SYSTOLIC BLOOD PRESSURE: 134 MMHG

## 2018-03-31 VITALS
TEMPERATURE: 98.06 F | OXYGEN SATURATION: 96 % | DIASTOLIC BLOOD PRESSURE: 89 MMHG | SYSTOLIC BLOOD PRESSURE: 134 MMHG | HEART RATE: 64 BPM

## 2018-03-31 VITALS
TEMPERATURE: 99.14 F | SYSTOLIC BLOOD PRESSURE: 155 MMHG | HEART RATE: 69 BPM | DIASTOLIC BLOOD PRESSURE: 81 MMHG | OXYGEN SATURATION: 94 %

## 2018-03-31 VITALS — OXYGEN SATURATION: 96 %

## 2018-03-31 VITALS — OXYGEN SATURATION: 95 %

## 2018-03-31 VITALS — OXYGEN SATURATION: 94 %

## 2018-03-31 LAB
BUN SERPL-MCNC: 13 MG/DL (ref 7–18)
CALCIUM SERPL-MCNC: 8.3 MG/DL (ref 8.5–10.1)
CO2 SERPL-SCNC: 28 MMOL/L (ref 21–32)
CREAT SERPL-MCNC: 0.6 MG/DL (ref 0.6–1.2)
EOSINOPHIL NFR BLD AUTO: 600 K/UL (ref 130–400)
GLUCOSE SERPL-MCNC: 47 MG/DL (ref 70–99)
HCT VFR BLD CALC: 35.4 % (ref 37–47)
HGB BLD-MCNC: 11.9 G/DL (ref 12–16)
MCH RBC QN AUTO: 27.9 PG (ref 25–34)
MCHC RBC AUTO-ENTMCNC: 33.6 G/DL (ref 32–36)
MCV RBC AUTO: 82.9 FL (ref 80–100)
PMV BLD AUTO: 10.3 FL (ref 7.4–10.4)
POTASSIUM SERPL-SCNC: 3.5 MMOL/L (ref 3.5–5.1)
PTT PATIENT: 31.6 SECONDS (ref 21–31)
PTT PATIENT: 46.2 SECONDS (ref 21–31)
PTT PATIENT: 54.6 SECONDS (ref 21–31)
RED CELL DISTRIBUTION WIDTH CV: 14.5 % (ref 11.5–14.5)
RED CELL DISTRIBUTION WIDTH SD: 42.9 FL (ref 36.4–46.3)
SODIUM SERPL-SCNC: 141 MMOL/L (ref 136–145)
WBC # BLD AUTO: 15.82 K/UL (ref 4.8–10.8)

## 2018-03-31 RX ADMIN — HEPARIN SODIUM SCH MLS/HR: 5000 INJECTION, SOLUTION INTRAVENOUS at 16:30

## 2018-03-31 RX ADMIN — PANTOPRAZOLE SODIUM SCH MLS/MIN: 40 INJECTION, POWDER, FOR SOLUTION INTRAVENOUS at 12:02

## 2018-03-31 RX ADMIN — VANCOMYCIN HYDROCHLORIDE SCH MG: 1 INJECTION, POWDER, LYOPHILIZED, FOR SOLUTION INTRAVENOUS at 21:09

## 2018-03-31 RX ADMIN — NYSTATIN SCH ML: 100000 SUSPENSION ORAL at 09:35

## 2018-03-31 RX ADMIN — INSULIN ASPART SCH UNITS: 100 INJECTION, SOLUTION INTRAVENOUS; SUBCUTANEOUS at 21:15

## 2018-03-31 RX ADMIN — VANCOMYCIN HYDROCHLORIDE SCH MG: 1 INJECTION, POWDER, LYOPHILIZED, FOR SOLUTION INTRAVENOUS at 13:25

## 2018-03-31 RX ADMIN — VANCOMYCIN HYDROCHLORIDE SCH MG: 1 INJECTION, POWDER, LYOPHILIZED, FOR SOLUTION INTRAVENOUS at 09:35

## 2018-03-31 RX ADMIN — Medication SCH ML: at 09:36

## 2018-03-31 RX ADMIN — METOPROLOL TARTRATE SCH MG: 25 TABLET, FILM COATED ORAL at 05:37

## 2018-03-31 RX ADMIN — Medication SCH ML: at 21:09

## 2018-03-31 RX ADMIN — NITROGLYCERIN SCH INCH: 20 OINTMENT TOPICAL at 09:35

## 2018-03-31 RX ADMIN — INSULIN ASPART SCH UNITS: 100 INJECTION, SOLUTION INTRAVENOUS; SUBCUTANEOUS at 11:00

## 2018-03-31 RX ADMIN — INSULIN GLARGINE SCH UNITS: 100 INJECTION, SOLUTION SUBCUTANEOUS at 21:16

## 2018-03-31 RX ADMIN — HEPARIN SODIUM SCH MLS/HR: 5000 INJECTION, SOLUTION INTRAVENOUS at 19:36

## 2018-03-31 RX ADMIN — INSULIN ASPART SCH UNITS: 100 INJECTION, SOLUTION INTRAVENOUS; SUBCUTANEOUS at 07:00

## 2018-03-31 RX ADMIN — Medication SCH ML: at 17:57

## 2018-03-31 RX ADMIN — HEPARIN SODIUM SCH MLS/HR: 5000 INJECTION, SOLUTION INTRAVENOUS at 23:34

## 2018-03-31 RX ADMIN — METOPROLOL TARTRATE SCH MG: 25 TABLET, FILM COATED ORAL at 17:59

## 2018-03-31 RX ADMIN — NYSTATIN SCH ML: 100000 SUSPENSION ORAL at 12:02

## 2018-03-31 RX ADMIN — CEFTRIAXONE SODIUM SCH MLS/HR: 1 INJECTION, POWDER, FOR SOLUTION INTRAVENOUS at 17:58

## 2018-03-31 RX ADMIN — INSULIN GLARGINE SCH UNITS: 100 INJECTION, SOLUTION SUBCUTANEOUS at 09:00

## 2018-03-31 RX ADMIN — METOPROLOL TARTRATE SCH MG: 25 TABLET, FILM COATED ORAL at 13:25

## 2018-03-31 RX ADMIN — VANCOMYCIN HYDROCHLORIDE SCH MG: 1 INJECTION, POWDER, LYOPHILIZED, FOR SOLUTION INTRAVENOUS at 17:57

## 2018-03-31 RX ADMIN — INSULIN ASPART SCH UNITS: 100 INJECTION, SOLUTION INTRAVENOUS; SUBCUTANEOUS at 16:15

## 2018-03-31 RX ADMIN — Medication SCH ML: at 13:25

## 2018-03-31 RX ADMIN — NYSTATIN SCH ML: 100000 SUSPENSION ORAL at 17:58

## 2018-03-31 RX ADMIN — NITROGLYCERIN SCH INCH: 20 OINTMENT TOPICAL at 04:04

## 2018-03-31 NOTE — CARDIOLOGY FOLLOW-UP
Subjective


Subjective


Date of Service:


Mar 31, 2018.


Pt evaluation today including:  conversation w/ patient, physical exam, chart 

review, lab review, review of studies, review of inpatient medication list


Additional Details:


Pt seen and examined, my first encounter with patient, appears rather weakened 

but improving per other provider notes. Pt denies cp, sob, palpitations, 

lightheadedness or dizziness.


States that breathing is about the same as yesterday despite radiographic 

improvement of volume.





Tele reviewed: sinus rhythm without arrhythmia or significant ectopy.





Problem List


Medical Problems:


(1) Anasarca


Status: Acute  





(2) Atrial fibrillation with RVR


Status: Acute  





(3) C. difficile diarrhea


Status: Acute  





(4) Elevated troponin


Status: Acute  





(5) Hyperthyroidism


Status: Acute  





(6) Hypotension


Status: Acute  





(7) Left leg cellulitis


Status: Acute  





(8) Left leg pain


Status: Acute  





(9) Right-sided heart failure


Status: Acute  





(10) Sepsis


Status: Acute  











Review of Systems


Constitutional:  + weakness


Respiratory:  No see HPI, No cough, No sputum, No wheezing, No shortness of 

breath, No dyspnea on exertion, No dyspnea at rest, No hemoptysis, No problem 

reported


Cardiac:  No see HPI, No chest pain, No orthopnea, No PND, No edema, No 

claudication, No palpitations, No problem reported


Abdomen:  No pain, No nausea, No vomiting, No diarrhea


Musculoskeletal:  + joint pain (LLE)


Heme:  No abnormal bleeding/bruising





Objective


Vital Signs





Last Vital Signs Documentation








  Date Time  Temp Pulse Resp B/P (MAP) Pulse Ox O2 Delivery O2 Flow Rate FiO2


 


3/31/18 11:34 36.6 94 18 162/84 (110) 94 Room Air  


 


3/28/18 15:36       2.0 











Physical Exam:


General Appearance:  WD/WN, no apparent distress, + cachetic, + thin


Eyes:  bilateral eyes normal inspection, bilateral eyes PERRL, bilateral eyes 

EOMI


ENT:  normal ENT inspection, hearing grossly normal, pharynx normal


Neck:  supple, no adenopathy, thyroid normal, no JVD, no carotid bruits, 

trachea midline


Respiratory/Chest:  no respiratory distress, no accessory muscle use, + 

decreased breath sounds (bilateral bases)


Cardiovascular:  regular rate, rhythm, no edema, no JVD, no murmur


Abdomen:  normal bowel sounds, non tender, soft


Neurologic/Psychiatric:  CNs II-XII nml as tested, no motor/sensory deficits, 

alert, normal mood/affect, oriented x 3


Skin:  normal color, warm/dry, no rash


Lymphatic:  no adenopathy





Assessment and Plan


1. pleural effusions


   improved on chest xray


   agree with thoracic medicine's decision not to tap   


   she will likely require diuresis 


   clinically I believe she is a little wet


   will give lasix 40mg po x 1 now along with potassium supplementation and 

follow for clinical response





2. PAF


   has remained in sinus now


   tolerating heparin gtt


   I would prefer to hold off on restarting Eliquis until we see how her 

pleural effusions may progress, cont heparin for now


   cont metoprolol, will change to 50mg po bid starting 4/1 for ease of use


   given multiple comorbidities not an ideal antiarrhythmic candidate at this 

time





3. HTN


   continues to remain elevated


   will uptitrate hydralazine to 50mg TID and will adjust as necessary


   will also change nitrates to oral





ok to d/c tele from cardiac standpoint

## 2018-03-31 NOTE — SURGERY PROGRESS NOTE
DATE: 03/31/2018

 

Mrs. Calixto was seen today.  She feels better.  She is on room air with

excellent saturations. Her vital signs are stable.  She sounded better to me.

 I repeated an x-ray today and I see improvement, particularly on the left

side.  I discussed this case with Dr. Pandya, Dr. Palmer Nelson and Dr. Rojas.  She has areas of lung which are attached on the right side.  It

is improved on the left.  The patient is reticent to have a tap and at this

point I would have to agree with her.  She looks improved.  I think we should

continue doing what we are doing.  She is complaining of pain in her left

ankle.  It is unclear to me why she is having this.  Her uric acid is normal.

 She does have evidence of venous insufficiency.  At any rate, I think she

has improved and at this point I would not offer her anything different.  We

will continue following along.  I have told the nurses to resume her heparin

and we will get her up trying to ambulate today.

## 2018-03-31 NOTE — PHARMACY PROGRESS NOTE
Pharmacy Glycemic Short Note 2


Date of Service


Mar 31, 2018.





OUTPATIENT ANTIDIABETIC REGIMEN: 


* n/a











ASSESSMENT:


* Ms Calixto is a 63 y/o F with no significant PMH who presented with abdominal 

pain and lower extremity swelling. She has had a complicated hospitalization 

with Afib with RVR, development of severe Clostridium difficile infection, E 

coli bacteremia on Rocephin.


* Yesterday the patient's blood sugars were [phone] mg/dL ... she 

received 26 units of insulin (15 units of basal insulin + 11 units of bolus 

insulin)


 * Pt with LOW BSG/hypoglycemia this morning. Will reduce basal insulin 

accordingly. 














PLAN FOR INPATIENT GLYCEMIC CONTROL:


* Basal insulin: decrease dosing secondary to hypo


 * Lantus 0-5 units SQ BID (0 units if blood sugar less than 160 mg/dL; 5 units 

if blood sugar greater than 160 mg/dL)





* Bolus insulin: no change


 * NovoLog per scale ACHS or Q6hrs while NPO


 * Goal Range:  Low 140 mg/dL - High 180 mg/dL


 * Correction Factor:  30 mg/dL/unit


 * Nutritional / Prandial insulin per carb ratio of  1 unit per 10 grams CHO 

consumed








OUTPATIENT RECOMMENDATIONS


* A1c of 6.5% is diagnostic for diabetes. 


* Recommend initiating Metformin as it is first line therapy for diabetes/pre-

diabetes. This is in addition to healthy eating, weight control, increased 

physical activity, and diabetes education.

## 2018-03-31 NOTE — DIAGNOSTIC IMAGING REPORT
LEFT ANKLE 3 VIEWS



CLINICAL HISTORY: Left ankle pain.



FINDINGS: 3 portable views of left ankle are obtained. No prior studies are

available for comparison at the time of dictation. The skeletal structures are

osteopenic. No fracture is seen. The ankle mortise is intact. There is no joint

effusion. A tiny dorsal calcaneal enthesophyte is noted. Mild soft tissue

swelling is present around the ankle.



IMPRESSION: Mild soft tissue swelling with no radiographic evidence of left

ankle fracture.







Electronically signed by:  Doroteo Jacobo M.D.

3/31/2018 11:40 AM



Dictated Date/Time:  3/31/2018 11:39 AM

## 2018-03-31 NOTE — DIAGNOSTIC IMAGING REPORT
SINGLE VIEW CHEST



CLINICAL HISTORY:  Pleural effusions.



FINDINGS: An AP, portable, upright chest radiograph is compared to study dated

3/29/2018. Correlation is made with chest CT dated 3/20/2018. The examination is

significantly degraded by portable technique and patient rotation.  The heart is

enlarged. Mild pulmonary vascular congestion persists. There are layering

pleural effusions with bibasilar consolidation.  No pneumothorax is seen. The

skeletal structures are osteopenic. The bony thorax is grossly intact.



IMPRESSION:



1. Cardiomegaly with evidence of mild pulmonary vascular congestion. This has

modestly improved from previous.



2. Layering pleural effusions with bibasilar consolidation which likely

represents atelectasis. This is unchanged from 3/29/2018.







Electronically signed by:  Doroteo Jacobo M.D.

3/31/2018 8:46 AM



Dictated Date/Time:  3/31/2018 8:45 AM

## 2018-04-01 VITALS
DIASTOLIC BLOOD PRESSURE: 86 MMHG | HEART RATE: 71 BPM | TEMPERATURE: 98.96 F | SYSTOLIC BLOOD PRESSURE: 178 MMHG | OXYGEN SATURATION: 94 %

## 2018-04-01 VITALS
DIASTOLIC BLOOD PRESSURE: 69 MMHG | HEART RATE: 61 BPM | TEMPERATURE: 98.06 F | SYSTOLIC BLOOD PRESSURE: 143 MMHG | OXYGEN SATURATION: 96 %

## 2018-04-01 VITALS
OXYGEN SATURATION: 94 % | HEART RATE: 68 BPM | TEMPERATURE: 98.96 F | DIASTOLIC BLOOD PRESSURE: 80 MMHG | SYSTOLIC BLOOD PRESSURE: 132 MMHG

## 2018-04-01 VITALS — DIASTOLIC BLOOD PRESSURE: 79 MMHG | HEART RATE: 70 BPM | SYSTOLIC BLOOD PRESSURE: 155 MMHG

## 2018-04-01 VITALS — TEMPERATURE: 98.6 F

## 2018-04-01 LAB
BUN SERPL-MCNC: 13 MG/DL (ref 7–18)
CALCIUM SERPL-MCNC: 8.4 MG/DL (ref 8.5–10.1)
CO2 SERPL-SCNC: 27 MMOL/L (ref 21–32)
CREAT SERPL-MCNC: 0.69 MG/DL (ref 0.6–1.2)
EOSINOPHIL NFR BLD AUTO: 611 K/UL (ref 130–400)
GLUCOSE SERPL-MCNC: 64 MG/DL (ref 70–99)
HCT VFR BLD CALC: 34.5 % (ref 37–47)
HGB BLD-MCNC: 11.6 G/DL (ref 12–16)
KETONES UR QL STRIP: 59 MG/DL
MCH RBC QN AUTO: 28 PG (ref 25–34)
MCHC RBC AUTO-ENTMCNC: 33.6 G/DL (ref 32–36)
MCV RBC AUTO: 83.1 FL (ref 80–100)
PH UR: 110 MG/DL (ref 0–200)
PMV BLD AUTO: 10.7 FL (ref 7.4–10.4)
POTASSIUM SERPL-SCNC: 3.9 MMOL/L (ref 3.5–5.1)
PTT PATIENT: 49 SECONDS (ref 21–31)
RED CELL DISTRIBUTION WIDTH CV: 14.7 % (ref 11.5–14.5)
RED CELL DISTRIBUTION WIDTH SD: 43.4 FL (ref 36.4–46.3)
SODIUM SERPL-SCNC: 141 MMOL/L (ref 136–145)
WBC # BLD AUTO: 16.83 K/UL (ref 4.8–10.8)

## 2018-04-01 RX ADMIN — METOPROLOL TARTRATE SCH MG: 50 TABLET, FILM COATED ORAL at 21:09

## 2018-04-01 RX ADMIN — HEPARIN SODIUM SCH MLS/HR: 5000 INJECTION, SOLUTION INTRAVENOUS at 07:09

## 2018-04-01 RX ADMIN — INSULIN ASPART SCH UNITS: 100 INJECTION, SOLUTION INTRAVENOUS; SUBCUTANEOUS at 18:56

## 2018-04-01 RX ADMIN — Medication SCH ML: at 21:04

## 2018-04-01 RX ADMIN — ISOSORBIDE MONONITRATE SCH MG: 30 TABLET, EXTENDED RELEASE ORAL at 08:10

## 2018-04-01 RX ADMIN — Medication SCH ML: at 16:32

## 2018-04-01 RX ADMIN — METHIMAZOLE SCH MG: 5 TABLET ORAL at 14:33

## 2018-04-01 RX ADMIN — INSULIN ASPART SCH UNITS: 100 INJECTION, SOLUTION INTRAVENOUS; SUBCUTANEOUS at 21:00

## 2018-04-01 RX ADMIN — HEPARIN SODIUM SCH MLS/HR: 5000 INJECTION, SOLUTION INTRAVENOUS at 15:09

## 2018-04-01 RX ADMIN — INSULIN ASPART SCH UNITS: 100 INJECTION, SOLUTION INTRAVENOUS; SUBCUTANEOUS at 08:00

## 2018-04-01 RX ADMIN — VANCOMYCIN HYDROCHLORIDE SCH MG: 1 INJECTION, POWDER, LYOPHILIZED, FOR SOLUTION INTRAVENOUS at 21:04

## 2018-04-01 RX ADMIN — HEPARIN SODIUM SCH MLS/HR: 5000 INJECTION, SOLUTION INTRAVENOUS at 20:56

## 2018-04-01 RX ADMIN — Medication SCH ML: at 13:32

## 2018-04-01 RX ADMIN — METOPROLOL TARTRATE SCH MG: 50 TABLET, FILM COATED ORAL at 08:11

## 2018-04-01 RX ADMIN — PANTOPRAZOLE SODIUM SCH MLS/MIN: 40 INJECTION, POWDER, FOR SOLUTION INTRAVENOUS at 13:33

## 2018-04-01 RX ADMIN — METHIMAZOLE SCH MG: 5 TABLET ORAL at 21:09

## 2018-04-01 RX ADMIN — VANCOMYCIN HYDROCHLORIDE SCH MG: 1 INJECTION, POWDER, LYOPHILIZED, FOR SOLUTION INTRAVENOUS at 13:32

## 2018-04-01 RX ADMIN — VANCOMYCIN HYDROCHLORIDE SCH MG: 1 INJECTION, POWDER, LYOPHILIZED, FOR SOLUTION INTRAVENOUS at 16:32

## 2018-04-01 RX ADMIN — INSULIN ASPART SCH UNITS: 100 INJECTION, SOLUTION INTRAVENOUS; SUBCUTANEOUS at 12:00

## 2018-04-01 RX ADMIN — ACETAMINOPHEN PRN MG: 325 TABLET ORAL at 08:10

## 2018-04-01 RX ADMIN — Medication SCH ML: at 10:31

## 2018-04-01 RX ADMIN — VANCOMYCIN HYDROCHLORIDE SCH MG: 1 INJECTION, POWDER, LYOPHILIZED, FOR SOLUTION INTRAVENOUS at 10:31

## 2018-04-01 NOTE — DIAGNOSTIC IMAGING REPORT
SINGLE VIEW CHEST



CLINICAL HISTORY:  Pleural effusions.



FINDINGS: An AP, portable, upright chest radiograph is compared to study dated

3/31/2018. Correlation is made with chest CT dated 3/20/2018. The examination is

degraded by portable technique and patient rotation.  The heart is enlarged.

Mild pulmonary vascular congestion persists. There are layering pleural

effusions with bibasilar consolidation.  No pneumothorax is seen. The skeletal

structures are osteopenic. The bony thorax is grossly intact.



IMPRESSION:



1. Cardiomegaly with evidence of mild pulmonary vascular congestion. This is

unchanged from yesterday.



2. Layering pleural effusions with bibasilar consolidation is also unchanged.







Electronically signed by:  oDroteo Jacobo M.D.

4/1/2018 8:48 AM



Dictated Date/Time:  4/1/2018 8:47 AM

## 2018-04-01 NOTE — PHARMACY PROGRESS NOTE
Pharmacy Glycemic Short Note 2


Date of Service


Apr 1, 2018.





OUTPATIENT ANTIDIABETIC REGIMEN: 


* n/a











ASSESSMENT:


* 61yo new diagnosis of diabetes. 


* Pt initiated on weight based SQ basal bolus insulin regimen on 3/27/18. Doses 

of insulin have been titrated daily based on BSG trends. 


* Pt with hypo 3/21/18:   basal insulin dosing decreased by 67% dosing 

decreased from 15 units to 5 units


* Pt with repeat hypo on 4/1/18:  will stop basal insulin 


* Post-prandial BSGs in range. No changes needed. 














PLAN FOR INPATIENT GLYCEMIC CONTROL:


* Basal insulin: D/C  secondary to hypo


 





* Bolus insulin: no change


 * NovoLog per scale ACHS or Q6hrs while NPO


 * Goal Range:  Low 110 mg/dL - High 140 mg/dL


 * Correction Factor:  30 mg/dL/unit


 * Nutritional / Prandial insulin per carb ratio of  1 unit per 10 grams CHO 

consumed








OUTPATIENT RECOMMENDATIONS


* A1c of 6.5% is diagnostic for diabetes. 


* Pt will need f/u with PCP post discharge for medication titration


* Recommend initiating Metformin as it is first line therapy for diabetes/pre-

diabetes. This is in addition to healthy eating, weight control, increased 

physical activity, and diabetes education.


 * Typically the XR version of metformin is best tolerated in terms of GI 

symptoms. 


 * Recommend starting metformin XR 500mg PO daily with evening meal. Continue 

to titrate metformin dosing upwards as recommended. Dosage increases should be 

made in increments of 500 mg weekly, up to 2,000 mg/day PO, given in divided 

doses. Doses above 2000 mg/day may be better tolerated if divided and given 3 

times per day with meals. Max: 2,550 mg/day PO, in divided doses

## 2018-04-01 NOTE — CARDIOLOGY FOLLOW-UP
Subjective


Subjective


Date of Service:


Apr 1, 2018.


Pt evaluation today including:  conversation w/ patient, physical exam, chart 

review, lab review, review of studies, review of inpatient medication list


Additional Details:


Pt seen and examined, appears to be more alert today. States that she doesn't 

feel much different from yesterday. No change in sob. Still weak and fatigued. 

Denies cp or palpitations.





Problem List


Medical Problems:


(1) Anasarca


Status: Acute  





(2) Atrial fibrillation with RVR


Status: Acute  





(3) C. difficile diarrhea


Status: Acute  





(4) Elevated troponin


Status: Acute  





(5) Hyperthyroidism


Status: Acute  





(6) Hypotension


Status: Acute  





(7) Left leg cellulitis


Status: Acute  





(8) Left leg pain


Status: Acute  





(9) Right-sided heart failure


Status: Acute  





(10) Sepsis


Status: Acute  











Review of Systems


Constitutional:  + weakness


Respiratory:  No see HPI, No cough, No sputum, No wheezing, No shortness of 

breath, No dyspnea on exertion, No dyspnea at rest, No hemoptysis, No problem 

reported


Cardiac:  No see HPI, No chest pain, No orthopnea, No PND, No edema, No 

claudication, No palpitations, No problem reported


Abdomen:  No pain, No nausea, No vomiting, No diarrhea


Musculoskeletal:  + joint pain (LLE)


Heme:  No abnormal bleeding/bruising





Objective


Vital Signs





Last Vital Signs Documentation








  Date Time  Temp Pulse Resp B/P (MAP) Pulse Ox O2 Delivery O2 Flow Rate FiO2


 


4/1/18 09:30 37.0       


 


4/1/18 07:30      Room Air  


 


4/1/18 07:24  71 16 178/86 (116) 94   


 


3/31/18 16:48       2.0 











Physical Exam:


General Appearance:  WD/WN, no apparent distress, + cachetic, + thin


Eyes:  bilateral eyes normal inspection, bilateral eyes PERRL, bilateral eyes 

EOMI


ENT:  normal ENT inspection, hearing grossly normal, pharynx normal


Neck:  supple, no adenopathy, thyroid normal, no JVD, no carotid bruits, 

trachea midline


Respiratory/Chest:  no respiratory distress, no accessory muscle use, + 

decreased breath sounds (bilateral bases)


Cardiovascular:  regular rate, rhythm, no edema, no JVD, no murmur


Abdomen:  normal bowel sounds, non tender, soft


Neurologic/Psychiatric:  CNs II-XII nml as tested, no motor/sensory deficits, 

alert, normal mood/affect, oriented x 3


Skin:  normal color, warm/dry, no rash


Lymphatic:  no adenopathy





Assessment and Plan


1. pleural effusions


   improved on chest xray


   agree with thoracic medicine's decision not to tap   


   over 2L negative overnight after receiving po lasix


   denies clinical improvement though and cxr unchanged


   will hold lasix today, likely start her on 20mg every other day starting 

tomorrow





2. PAF


   has remained in sinus now


   tolerating heparin gtt


   I would prefer to hold off on restarting Eliquis until we see how her 

pleural effusions may progress, cont heparin for now


   cont metoprolol, will change to 50mg po bid starting 4/1 for ease of use


   given multiple comorbidities not an ideal antiarrhythmic candidate at this 

time





3. HTN


   continues to remain elevated despite uptitration of hydralazine


   pt found to be hyperthyroid, will start treatment today as per Dr. Johann jaramillo to further uptitrate hydralazine as needed





encouraged patient to participate with PT/OT

## 2018-04-01 NOTE — SURGERY PROGRESS NOTE
Subjective


Date of Service:  Apr 1, 2018.


Pt. notes her breathing continues to improve.





Objective


Vitals











  Date Time  Temp Pulse Resp B/P (MAP) Pulse Ox O2 Delivery O2 Flow Rate FiO2


 


4/1/18 09:30 37.0       


 


4/1/18 07:30      Room Air  


 


4/1/18 07:24 37.2 71 16 178/86 (116) 94 Room Air  


 


4/1/18 00:53      Room Air  


 


3/31/18 22:52 37.3 69 16 155/81 (105) 94 Room Air  


 


3/31/18 21:17 37.1 71 16 155/73 (100) 94 Room Air  


 


3/31/18 19:30      Room Air  


 


3/31/18 16:48 36.7 64 20  96  2.0 


 


3/31/18 16:21 36.7 64 20 134/89 (104) 96 Room Air  


 


3/31/18 16:00 36.7 64 20  96  2.0 











Physical Exam


General:  + well developed, + well nourished, 


   No distress


CV:  + RRR


Pulmonary:  + pertinent finding (BS are decreased at bases), 


   No accessory muscle use, No respiratory distress


Neurologic:  + alert & oriented x 3





Radiology


CXR today shows bilateral pleural effusions; improvement noted from CXR 2 days 

ago





Assessment & Plan


62 year old female with pleural effusion 


-as CXR has improved and pt. without dyspnea on room air, thoracentesis not 

performed


-continue to monitor clinically and intervene if needed

## 2018-04-02 VITALS
DIASTOLIC BLOOD PRESSURE: 68 MMHG | TEMPERATURE: 98.42 F | SYSTOLIC BLOOD PRESSURE: 141 MMHG | HEART RATE: 65 BPM | OXYGEN SATURATION: 95 %

## 2018-04-02 VITALS
HEART RATE: 63 BPM | OXYGEN SATURATION: 94 % | DIASTOLIC BLOOD PRESSURE: 87 MMHG | TEMPERATURE: 98.78 F | SYSTOLIC BLOOD PRESSURE: 180 MMHG

## 2018-04-02 VITALS — SYSTOLIC BLOOD PRESSURE: 115 MMHG | DIASTOLIC BLOOD PRESSURE: 60 MMHG

## 2018-04-02 VITALS — DIASTOLIC BLOOD PRESSURE: 76 MMHG | SYSTOLIC BLOOD PRESSURE: 137 MMHG

## 2018-04-02 VITALS
OXYGEN SATURATION: 96 % | TEMPERATURE: 98.96 F | SYSTOLIC BLOOD PRESSURE: 158 MMHG | HEART RATE: 68 BPM | DIASTOLIC BLOOD PRESSURE: 81 MMHG

## 2018-04-02 VITALS — SYSTOLIC BLOOD PRESSURE: 161 MMHG | DIASTOLIC BLOOD PRESSURE: 83 MMHG | HEART RATE: 66 BPM

## 2018-04-02 VITALS — OXYGEN SATURATION: 94 %

## 2018-04-02 LAB
BUN SERPL-MCNC: 12 MG/DL (ref 7–18)
CALCIUM SERPL-MCNC: 8.5 MG/DL (ref 8.5–10.1)
CO2 SERPL-SCNC: 26 MMOL/L (ref 21–32)
CREAT SERPL-MCNC: 0.68 MG/DL (ref 0.6–1.2)
EOSINOPHIL NFR BLD AUTO: 635 K/UL (ref 130–400)
GLUCOSE SERPL-MCNC: 99 MG/DL (ref 70–99)
HCT VFR BLD CALC: 34.9 % (ref 37–47)
HGB BLD-MCNC: 11.8 G/DL (ref 12–16)
MCH RBC QN AUTO: 28.3 PG (ref 25–34)
MCHC RBC AUTO-ENTMCNC: 33.8 G/DL (ref 32–36)
MCV RBC AUTO: 83.7 FL (ref 80–100)
PMV BLD AUTO: 11.1 FL (ref 7.4–10.4)
POTASSIUM SERPL-SCNC: 3.9 MMOL/L (ref 3.5–5.1)
PTT PATIENT: 46.9 SECONDS (ref 21–31)
RED CELL DISTRIBUTION WIDTH CV: 14.8 % (ref 11.5–14.5)
RED CELL DISTRIBUTION WIDTH SD: 43.8 FL (ref 36.4–46.3)
SODIUM SERPL-SCNC: 140 MMOL/L (ref 136–145)
WBC # BLD AUTO: 12 K/UL (ref 4.8–10.8)

## 2018-04-02 RX ADMIN — Medication SCH ML: at 13:15

## 2018-04-02 RX ADMIN — VANCOMYCIN HYDROCHLORIDE SCH MG: 1 INJECTION, POWDER, LYOPHILIZED, FOR SOLUTION INTRAVENOUS at 10:26

## 2018-04-02 RX ADMIN — PANTOPRAZOLE SODIUM SCH MLS/MIN: 40 INJECTION, POWDER, FOR SOLUTION INTRAVENOUS at 11:20

## 2018-04-02 RX ADMIN — MAGNESIUM SULFATE IN DEXTROSE SCH MLS/HR: 10 INJECTION, SOLUTION INTRAVENOUS at 09:54

## 2018-04-02 RX ADMIN — CEFTRIAXONE SODIUM SCH MLS/HR: 1 INJECTION, POWDER, FOR SOLUTION INTRAVENOUS at 10:30

## 2018-04-02 RX ADMIN — MAGNESIUM SULFATE IN DEXTROSE SCH MLS/HR: 10 INJECTION, SOLUTION INTRAVENOUS at 11:20

## 2018-04-02 RX ADMIN — VANCOMYCIN HYDROCHLORIDE SCH MG: 1 INJECTION, POWDER, LYOPHILIZED, FOR SOLUTION INTRAVENOUS at 18:21

## 2018-04-02 RX ADMIN — Medication SCH ML: at 21:38

## 2018-04-02 RX ADMIN — VANCOMYCIN HYDROCHLORIDE SCH MG: 1 INJECTION, POWDER, LYOPHILIZED, FOR SOLUTION INTRAVENOUS at 21:38

## 2018-04-02 RX ADMIN — Medication SCH ML: at 18:21

## 2018-04-02 RX ADMIN — INSULIN ASPART SCH UNITS: 100 INJECTION, SOLUTION INTRAVENOUS; SUBCUTANEOUS at 13:34

## 2018-04-02 RX ADMIN — MAGNESIUM SULFATE IN DEXTROSE SCH MLS/HR: 10 INJECTION, SOLUTION INTRAVENOUS at 08:17

## 2018-04-02 RX ADMIN — METHIMAZOLE SCH MG: 5 TABLET ORAL at 08:08

## 2018-04-02 RX ADMIN — ACETAMINOPHEN PRN MG: 325 TABLET ORAL at 05:35

## 2018-04-02 RX ADMIN — INSULIN ASPART SCH UNITS: 100 INJECTION, SOLUTION INTRAVENOUS; SUBCUTANEOUS at 09:53

## 2018-04-02 RX ADMIN — METOPROLOL TARTRATE SCH MG: 50 TABLET, FILM COATED ORAL at 21:39

## 2018-04-02 RX ADMIN — METOPROLOL TARTRATE SCH MG: 50 TABLET, FILM COATED ORAL at 08:09

## 2018-04-02 RX ADMIN — METHIMAZOLE SCH MG: 5 TABLET ORAL at 21:39

## 2018-04-02 RX ADMIN — APIXABAN SCH MG: 2.5 TABLET, FILM COATED ORAL at 21:40

## 2018-04-02 RX ADMIN — METHIMAZOLE SCH MG: 5 TABLET ORAL at 13:25

## 2018-04-02 RX ADMIN — INSULIN ASPART SCH UNITS: 100 INJECTION, SOLUTION INTRAVENOUS; SUBCUTANEOUS at 18:22

## 2018-04-02 RX ADMIN — VANCOMYCIN HYDROCHLORIDE SCH MG: 1 INJECTION, POWDER, LYOPHILIZED, FOR SOLUTION INTRAVENOUS at 13:15

## 2018-04-02 RX ADMIN — ISOSORBIDE MONONITRATE SCH MG: 30 TABLET, EXTENDED RELEASE ORAL at 08:09

## 2018-04-02 RX ADMIN — INSULIN ASPART SCH UNITS: 100 INJECTION, SOLUTION INTRAVENOUS; SUBCUTANEOUS at 21:39

## 2018-04-02 RX ADMIN — Medication SCH ML: at 10:27

## 2018-04-02 RX ADMIN — ATORVASTATIN CALCIUM SCH MG: 40 TABLET, FILM COATED ORAL at 09:51

## 2018-04-02 NOTE — SURGERY PROGRESS NOTE
Subjective


Date of Service:  Apr 2, 2018.


Pt. notes she is not SOB at the present time.





Objective


Vitals











  Date Time  Temp Pulse Resp B/P (MAP) Pulse Ox O2 Delivery O2 Flow Rate FiO2


 


4/2/18 09:14    115/60 (78)    


 


4/2/18 09:07     94 Room Air  


 


4/2/18 07:39 37.1 63 18 180/87 (118) 94 Room Air  


 


4/1/18 23:35      Room Air  


 


4/1/18 22:52 37.2 68 20 132/80 (97) 94 Room Air  


 


4/1/18 21:07  70  155/79 (104)    


 


4/1/18 15:30      Room Air  


 


4/1/18 15:12 36.7 61 16 143/69 (93) 96 Room Air  











Physical Exam


General:  No distress


CV:  + RRR


Pulmonary:  + pertinent finding (decreased BS at bases), 


   No accessory muscle use, No respiratory distress


Neurologic:  + alert & oriented x 3





Assessment & Plan


62 year old female with pleural effusion 


-thoracentesis not performed due to clinical improvement


-continue to monitor clinically and intervene if needed

## 2018-04-03 VITALS
DIASTOLIC BLOOD PRESSURE: 85 MMHG | OXYGEN SATURATION: 95 % | TEMPERATURE: 98.24 F | SYSTOLIC BLOOD PRESSURE: 172 MMHG | HEART RATE: 66 BPM

## 2018-04-03 VITALS
SYSTOLIC BLOOD PRESSURE: 172 MMHG | DIASTOLIC BLOOD PRESSURE: 85 MMHG | HEART RATE: 66 BPM | OXYGEN SATURATION: 95 % | TEMPERATURE: 98.24 F

## 2018-04-03 LAB
BUN SERPL-MCNC: 11 MG/DL (ref 7–18)
CALCIUM SERPL-MCNC: 8.6 MG/DL (ref 8.5–10.1)
CO2 SERPL-SCNC: 27 MMOL/L (ref 21–32)
CREAT SERPL-MCNC: 0.77 MG/DL (ref 0.6–1.2)
EOSINOPHIL NFR BLD AUTO: 595 K/UL (ref 130–400)
GLUCOSE SERPL-MCNC: 89 MG/DL (ref 70–99)
HCT VFR BLD CALC: 33.6 % (ref 37–47)
HGB BLD-MCNC: 10.9 G/DL (ref 12–16)
INR PPP: 1.1 (ref 0.9–1.1)
MCH RBC QN AUTO: 27.5 PG (ref 25–34)
MCHC RBC AUTO-ENTMCNC: 32.4 G/DL (ref 32–36)
MCV RBC AUTO: 84.6 FL (ref 80–100)
PMV BLD AUTO: 11.1 FL (ref 7.4–10.4)
POTASSIUM SERPL-SCNC: 4 MMOL/L (ref 3.5–5.1)
PTT PATIENT: 32.4 SECONDS (ref 21–31)
RED CELL DISTRIBUTION WIDTH CV: 15 % (ref 11.5–14.5)
RED CELL DISTRIBUTION WIDTH SD: 44.3 FL (ref 36.4–46.3)
SODIUM SERPL-SCNC: 141 MMOL/L (ref 136–145)
WBC # BLD AUTO: 9.39 K/UL (ref 4.8–10.8)

## 2018-04-03 RX ADMIN — Medication SCH ML: at 08:29

## 2018-04-03 RX ADMIN — INSULIN ASPART SCH UNITS: 100 INJECTION, SOLUTION INTRAVENOUS; SUBCUTANEOUS at 08:45

## 2018-04-03 RX ADMIN — INSULIN ASPART SCH UNITS: 100 INJECTION, SOLUTION INTRAVENOUS; SUBCUTANEOUS at 13:03

## 2018-04-03 RX ADMIN — ISOSORBIDE MONONITRATE SCH MG: 30 TABLET, EXTENDED RELEASE ORAL at 08:16

## 2018-04-03 RX ADMIN — Medication SCH ML: at 12:59

## 2018-04-03 RX ADMIN — METHIMAZOLE SCH MG: 5 TABLET ORAL at 08:16

## 2018-04-03 RX ADMIN — ATORVASTATIN CALCIUM SCH MG: 40 TABLET, FILM COATED ORAL at 08:17

## 2018-04-03 RX ADMIN — CEFTRIAXONE SODIUM SCH MLS/HR: 1 INJECTION, POWDER, FOR SOLUTION INTRAVENOUS at 08:28

## 2018-04-03 RX ADMIN — METOPROLOL TARTRATE SCH MG: 50 TABLET, FILM COATED ORAL at 08:16

## 2018-04-03 RX ADMIN — VANCOMYCIN HYDROCHLORIDE SCH MG: 1 INJECTION, POWDER, LYOPHILIZED, FOR SOLUTION INTRAVENOUS at 08:29

## 2018-04-03 RX ADMIN — APIXABAN SCH MG: 2.5 TABLET, FILM COATED ORAL at 08:17

## 2018-04-03 RX ADMIN — VANCOMYCIN HYDROCHLORIDE SCH MG: 1 INJECTION, POWDER, LYOPHILIZED, FOR SOLUTION INTRAVENOUS at 12:59

## 2018-04-03 NOTE — SURGERY PROGRESS NOTE
DATE: 04/03/2018

 

SUBJECTIVE:  Kat Calixto was seen today on 04/03/2018.  We elected to

hold off on a thoracentesis as the patient was reticent to have it done and

her x-ray had improved.  She is still on room air.  She is really not pushing

herself to the point where she gets dyspneic.  She is complaining of pain in

her left ankle.  She does have findings of venous insufficiency, but no

evidence of a cellulitis.

 

I would hold off as the patient is improving from the septic episode she

suffered.  We will refer her back to the cardiologist and if her pleural

effusion remains problematic, we will be glad to see her back.

## 2018-04-03 NOTE — DISCHARGE INSTRUCTIONS
Discharge Instructions


Date of Service


Apr 3, 2018.





Admission


Reason for Admission:  Sepsis





Discharge


Discharge Diagnosis / Problem:  Septis, stroke, A. Fib, high blood pressure, 

overactive thyroid





Discharge Goals


Goal(s):  Decrease discomfort, Improve function, Diagnostic testing, 

Therapeutic intervention





Activity Recommendations


Activity Level:  Up Ad [place]


Therapies:  Physical Therapy, Occupational Therapy





.





Additional Information


Patient informed of condition:  Yes


Advance Directives:  No


DNR:  No


Level of Care:  Acute Rehab


Communicable Disease:  Yes (C. Diff)


Prognosis:  Improving


[place] Catheter:  No





Instructions / Follow-Up


Instructions / Follow-Up


Please follow-up with primary care physician after stay at [facility].


Please follow-up with cardiology for your atrial fibrillation (irregular heart 

rhythm), heart failure, and blood pressure.


Please follow-up with neurology regarding the stroke.


Please follow-up with endocrinology regarding the overactive thyroid.





Will be on Rocephin 2grams (antibiotic) once daily via IV on April 4 and April 5

, then stop.


You will be on Vancomycin (tablet) four times daily for stomach infection (C. 

Diff) until April 18 (last doses on April 17).





You will be started on Eliquis (blood thinner) for stroke and Atrial 

fibrillation (irregular heart rhythm).


You will be started on medications to protect your heart and blood pressure (

Hydralazine, Imdur, Metoprolol).


You will be started on Lasix 20mg on Monday, Wednesday, Friday - to prevent 

fluid overload of the lungs.


You will be started on metformin for diabetes.


You will be started on Lipitor (cholesterol medication) because of stroke, 

diabetes and heart protection.





Should start on low dose Lisinopril in 1-2 weeks if kidney function is okay; 

for blood pressure, heart failure, and diabetes.





Repeat TSH in 4-6 weeks to check thyroid medications.


Repeat Vitamin D levels in 3 months.


Repeat Ha1c in 3 months; should have urinary microalbuminuria in 3 months.





Repeat CBC and PRP in 1 week.





Current Hospital Diet


Patient's current hospital diet: Low Sodium Diet (2gm Na), Diabetes Type 2 Diet





Discharge Diet


Recommended Diet:  Diabetes Type 2 Diet





Pending Studies


Studies pending at discharge:  no





Laboratory Results





Hemoglobin A1c








Test


  3/27/18


23:02 Range/Units


 


 


Estimated Average Glucose 140   mg/dl


 


Hemoglobin A1c 6.5 H 4.5-5.6  %








Lipid Panel








Test


  4/1/18


05:54 Range/Units


 


 


Triglycerides Level 68  0-150  mg/dl


 


Cholesterol Level 110  0-200  mg/dl


 


HDL Cholesterol 37   mg/dl


 


Cholesterol/HDL Ratio 3.0   


 


LDL Cholesterol, Calculated 59   mg/dl











Medical Emergencies








.


Who to Call and When:





Medical Emergencies:  If at any time you feel your situation is an emergency, 

please call 911 immediately.





.





Non-Emergent Contact


Non-Emergency issues call your:  Primary Care Provider, Cardiologist, 

Neurologist, Specialist (endocrinologist)





.


.








"Provider Documentation" section prepared by Gary Wills.








.





Core Measure Problem


Core Measures:  Stroke





[facility] Core Measures


Reason no ASA as I/P:  Treatment not indicated


Reason no ASA at D/C:  Treatment not indicated


Reason no statin as I/P:  Treatment provided - N/A


Reason no statin at D/C:  Treatment provided - N/A





Stroke Core Measures


Reason no t-PA for Stroke:  Treatment not indicated


Reason no antithrom by day 2:  Treatment not indicated


Reason no antithrom at D/C:  Treatment not indicated


Reason no statin at D/C:  Treatment provided - N/A


Reason no anticoag w/a fib:  Treatment provided - N/A

## 2018-04-03 NOTE — PROGRESS NOTE
DATE: 03/24/2018

 

CARDIOLOGY CONSULTATION FOLLOWUP NOTE

 

The patient seen and examined.  Chart, medications, telemetry reviewed. 

Hemodynamically and laboratory studies demonstrate clinical improvement.

 

SUBJECTIVE:  The patient does respond to some questions without accurate

replies.  Notes no specific complaints.

 

OBJECTIVE:

VITAL SIGNS:  Heart rate is 100, blood pressure is 167/80.

NECK:  Thin.  There is no distinct jugular venous distention.

LUNGS:  Reveal mildly diminished breath sounds.

CARDIOVASCULAR:  Regular.  There is no S3 gallop.  Right ventricular heave.

ABDOMEN:  Soft, nontender.

EXTREMITIES:  Without cyanosis or clubbing.  There is no peripheral edema.

 

LABORATORY DATA:  White cell count is 19.5, hemoglobin is 11.9.  Sodium is

142, potassium is 3.3, chloride is 108, bicarbonate is 25, BUN is 46,

creatinine is 1.1.  I's and O's reflect a brisk diuresis overnight of greater

than 4 liters.

 

Telemetry reveals sinus and sinus tachycardia.  No further pauses.

 

IMPRESSION:  A 62-year-old acutely ill female with issues of sepsis,

paroxysmal atrial fibrillation, and acute-on-chronic renal failure.  The

patient appears to have "turned the corner."  Atrial arrhythmias have settled

with use of IV metoprolol.  No further pauses since discontinuation of

diltiazem.  Volume status appears to be improving with diuresis.

 

RECOMMENDATIONS:  We will once again titrate beta blocker, resuming oral

metoprolol 25 mg twice per day as prehospital dosing.  There is room to

increase IV metoprolol if patient unable to take oral.  If blood pressures

remain elevated, we would add low dose topical nitrates to regimen for

afterload reduction and preload reduction in combination with hydralazine. 

We would switch hydralazine to p.o. promptly as well as it can aggravate

sinus tachycardia and arrhythmias as well.  Hopefully, as bowel begins to

work more efficiently, we will see better tolerance of medications in rhythm.

 If hypotension is induced by topical nitrates, we would discontinue.  We

will repeat echocardiogram on Monday.
DATE: 03/24/2018

 

SUBJECTIVE:  Kat looks much better today than she did yesterday when I

briefly saw her and much better than the day before.  Mentally, she is

clearing nicely.  Her family is conversing with her and she is oriented to place
,

at least and her sepsis, her right-sided heart failure, her atrial

fibrillation and her hypertension all seem to be coming back under control

and the brain is clearing up as well.  We do not have any repeat imaging

studies as yet, but frankly as long as she is doing this well, I really do

not think to  do another CT scan of the head unless her progress

is attenuated or things change.  I will check by and look at her

periodically, but for now, her exam is pretty normal with the exception of a

little low-grade confusion and there is a little tremulousness of the hands,

worse on the left, and she is obviously globally weak and lethargic.  I will

look at her again tomorrow, but for now I have no further suggestions and

again I do not think imaging of the brain and another EEG are going to be

required as long as she continues to progress.

 

 

 

 

VLAD
DATE: 03/25/2018

 

CARDIOLOGY CONSULTATION FOLLOWUP NOTE

 

SUBJECTIVE:  The patient appears brighter this morning, does answer some

questions.  Notes no focal complaints, is receiving tube feedings.  Abdomen

appears to be working better per her description.

 

OBJECTIVE:

VITAL SIGNS:  Heart rate is 73, blood pressure is 154/88.  Telemetry revealed

transient run of atrial fibrillation with rapid ventricular response earlier

this morning.

NECK:  Thin.  There is no jugular venous distention.

LUNGS:  Notable for mildly diminished breath sounds.

ABDOMEN:  Soft.

EXTREMITIES:  Without cyanosis or clubbing.  There is no peripheral edema.

 

LABORATORY STUDIES:  Sodium is 144, potassium is 3.1, chloride is 108, BUN is

28, creatinine is 0.77.  White cell count is 17.6, hemoglobin is 11.5.

 

IMPRESSION:  A 62-year-old female critically ill, admitted with acute sepsis,

E. coli/C. difficile with echocardiogram demonstrating biventricular systolic

heart failure, evidence of acute renal insufficiency.  The patient is now

demonstrating post-acute tubular necrosis diuresis, transient atrial

fibrillation occurs.  She is tolerating beta blocker and we would continue

so, avoid diltiazem in this patient.  Potassium has been ordered for

replacement as appropriate.  We will review echocardiogram.
DATE: 03/25/2018

 

SUBJECTIVE:  Kat looks even brighter than she did yesterday.  She is

oriented to place, little vague about what town we are in, a little soft on

the year and the date, but things are better.  She is more active.  Family is

pleased with her progress and while she is not nearly at her baseline, the

direction seems to be that of daily improvement.  Apparently, she had a little 
run of

atrial fibrillation which is now back to sinus rhythm.  She is being treated

by cardiology is being seen by infectious disease and multiple specialists and 
at this point, I do not

think neurology has a whole lot more to offer as long as she continues to

improve.  We will probably make another visit in a day or two, but for now I

do not think we really need to do a CAT scan and I certainly do not think we

need to do a repeat EEG as the first one showed only mild diffuse slowing and

nothing, suggestive of ongoing nonconvulsive status epilepticus or potential

seizure activity.

 

 

 

 

VICTORIANOD
DATE: 03/26/2018

 

The patient seen and examined.  Chart, medications, telemetry reviewed.

 

SUBJECTIVE:  The patient appears slightly more alert and conversant this

morning.  She continues to receive tube feeds via nasogastric tube.  Denies

any specific complaints.  Blood pressures have been trending higher over the

past 24 hours.  She has had no further atrial arrhythmias of significance. 

Notes no abdominal pain or discomfort.  Has had some mild swelling in her

feet.

 

OBJECTIVE:

VITAL SIGNS:  Heart rate 78, blood pressure is 174/114.

NECK:  Thin.  There is no distinct jugular venous distention.  There are

brisk, prompt carotid pulses.

LUNGS:  Reveal diminished breath sounds, bibasilar, left slightly greater

than right.

CARDIOVASCULAR:  Regular.  There is no S3 gallop.

ABDOMEN:  Soft.

EXTREMITIES:  Without cyanosis or clubbing.  There is no peripheral edema.

 

LABORATORY DATA:  Sodium is 142, potassium is 3.6, chloride is 109,

bicarbonate is 27, BUN is 32, creatinine 0.8.

 

Echocardiogram today demonstrates slightly improved LV systolic function, EF

approximately 45% with left atrial and right atrial dilatation.  Pulmonary

pressures appeared to have decreased slightly.  There is a moderate-sized

left pleural effusion.  Right ventricular overall function appears to be

improved.  There is moderate to severe tricuspid insufficiency.

 

IMPRESSION:  Complex 62-year-old female, gradually improving after

presentation with E. coli sepsis as well as C. difficile colitis.  Course

complicated by paroxysmal atrial arrhythmias, transient acute renal

insufficiency, encephalopathy.  The patient is gradually manifesting

improvement, though blood pressures are now becoming hypertensive.  Concerns

regarding thyroid abnormalities on laboratory studies remain present.

 

PLAN:  We will increase metoprolol to 50 mg q. 6 hours via NG.  Topical

nitrates will be added for hypertension control with afterload reduction via

hydralazine, also to be administered via NG initially at 12.5 mg t.i.d.  We

may titrate this for blood pressure control.  We plan on maintaining neutral

fluid balance at this time.  Again gradually increasing activity levels, out

of bed to chair, etc.  Consideration may be made for endocrinology evaluation

of thyroid disorder.
DATE: 03/27/2018

 

CARDIOLOGY CONSULTATION FOLLOWUP NOTE

 

The patient seen and examined.  Chart, medications, telemetry reviewed.

 

SUBJECTIVE:  The patient appears more breathless on auscultation examination

on prior days.  Notes no chest pains.  Notes no tachy palpitations.  Hands

appear more edematous.  Blood pressures have been labile and are trending

slightly towards improved.

 

OBJECTIVE:

VITAL SIGNS:  Heart rate is 68, blood pressure is 146/84, blood pressure is

170s-180s throughout the night.  I's and O's are positive times greater than

3 liters for the past 2 days.  On examination, there is mild jugular venous

distention.  The patient is breathless with augmentation of respiratory

accessory muscles.

LUNGS:  Reveal diminished breath sounds at the bases.

CARDIOVASCULAR:  Regular.  There is no S3 gallop.

ABDOMEN:  Soft, minimal distention.

EXTREMITIES:  Without cyanosis or clubbing.  There is mild pedal and hand

edema.

 

LABORATORY DATA:  White cell count is 19.6, hemoglobin is 11.7.  Sodium is

141, potassium is 5.0, chloride is 109, bicarbonate is 26, BUN is 34,

creatinine is 0.8.

 

IMPRESSION:  A 62-year-old female with complex history as well outlined

presenting with multiple issues including E. coli sepsis, C. difficile

colitis, paroxysmal atrial fibrillation, marked hypertension and transient

left ventricular and right ventricular dysfunction by echocardiogram,

improving slowly.

 

RECOMMENDATIONS:  Given the positive fluid balance and breathlessness this

morning, a chest x-ray will be ordered and a single dose of furosemide 20 mg

will be administered.  We will increase hydralazine to 25 mg t.i.d. for blood

pressure control.  Continue current dose of metoprolol and topical nitrates. 

White cell count is once again trending slightly upward, will need to be

followed closely.
DATE: 03/27/2018

 

SUBJECTIVE:  Kat earlier today was described as much more alert and

interactive than she is now when I am evaluating her.  She has been moved up

to room 201.  She is mumbling, some of her speech is unclear.  She appears a

little agitated, but she states she is in [facility] and I

believe she states she lives now in [place], but the rest of her

conversation is a little vague.  She can identify my fingers and  hand.  There 
is no real

motor dysfunction, although she is a little tremulous and there is a subtle

right facial asymmetry.

 

I am not sure this reflects an acute change, but at this point, I am going to

order a noncontrast CT and I will get an EEG tomorrow just to be sure that in

her atrial fibrillation episodes, she has not had some cerebral

infarctions which may now be demonstrable on ct or any subclinical seizure 
acivity. We may end up having to do an MRI if this new  confusional state

persists.  We will check back with her tomorrow.

 

 

 

 

MTDROB
DATE: 03/29/2018

 

CARDIOLOGY CONSULTATION FOLLOWUP NOTE

 

The patient seen and examined.  Chart, medications, telemetry reviewed.

 

SUBJECTIVE:  The patient alert, more conversant today.  Denies any chest pain

or discomfort.  Notes no dizziness or lightheadedness.  Has been able to take

orals without difficulty.

 

OBJECTIVE:

VITAL SIGNS:  Heart rate is 75, blood pressure is 162/81.  Telemetry reveals

no further atrial arrhythmias.

NECK:  Thin.  There is no distinct jugular venous distention.

LUNGS:  Reveal diminished breath sounds bibasilar.

CARDIOVASCULAR:  Regular.  There is no S3 gallop.

ABDOMEN:  Soft.

EXTREMITIES:  Reveal no edema.

 

LABORATORY DATA:  White cell count is 19.4, hemoglobin is 11.2.  Sodium is

140, potassium is 3.8, chloride is 106, bicarbonate is 28, BUN is 21, and

creatinine is 0.69.   PTT is 52.6 today.

 

IMPRESSION AND PLAN:  A 62-year-old female with complex history of acute

illness in association with Escherichia coli sepsis, Clostridium difficile

colitis, paroxysmal atrial fibrillation and acute renal insufficiency.  She

is gradually making recovery, blood pressures are trending higher once again.

 Now the patient is taking orals, oral metoprolol as ordered.  In addition,

we will resume oral hydralazine 12.5 mg 3 times per day initially.  I suspect

patient will continue to require doses of oral or IV furosemide to balance fluid

status.  Pulmonary is evaluating patient's bilateral pleural effusions for

possible thoracentesis.

 

 

 

 

MTDD
Internal Med Progress Note


Date of Service:


Mar 20, 2018.


Provider Documentation:





SUBJECTIVE:





The patient was seen and examined


She is recently moved from [place] without any significant past medical 

history.


She has been complaining of bilateral leg swelling for the last 5 or 6 months.


She has been complaining of more shortness of breath for the last 2 days 

associated with fever and chills.


She also has frequency of urination but no dysuria


She does not feel any better since admission








OBJECTIVE:





Vital Signs-as noted below





Exam:


General-mild to moderate distress at rest


Eyes-normal


ENT-normal


Neck-supple


Lungs-decreased breath sounds both sides without any crackles.


Heart-S1 and S2 regular, no murmur appreciated


Abdomen-mildly distended, mildly tender, moderate hypogastric tenderness, bowel 

sounds present


Clinically difficult to feel for any acetic fluid


Extremities-bilateral leg edema-1-2+


Edema is more pronounced on the left side along the thighs.


Neuro-alert awake and oriented


Generally weak but no focal neuro deficit.





Lab data as noted below.


ASSESSMENT & PLAN:





Severe sepsis


SIRS plus lactic acidosis


Possible sources :left lower extremity cellulitis,GI (colitis, SBP-ascites on CT

)or UTI


Doxycycline for cellulitis


Cefepime and Flagyl for possible intraabdominal infection if any  


Continue current antibiotics


Blood cultures-Gm Negative Bacilli-sensitivity pending


Urine culture-pending





SOB with Clifford Legs Swelling and Bowel edema 


Likely secondary to Right Sided Hearty Failure 


Will give small amount of fluid for ongoing sepsis and low urine output


Cardiology consulted 


ECHO-ordered





Possible  Cirrhosis.


Possibly from RSHF, passive congestion (unknown duration)


Appreciate GI evaluation


GI does not think the patient has Cirrhosis


US abdomen -minimal ascites 





Hyperthyroidism (possible Graves disease) new diagnosis


Started on Methimazole 


Will need OP Endocrine follow up 





Chronic anemia, unknown baseline.


Work up 





Thrombocytopenia secondary to sepsis, cirrhosis.


  





DVT prophylaxis, SCDs RE thrombocytopenia.  





Full code.


Vital Signs:











  Date Time  Temp Pulse Resp B/P (MAP) Pulse Ox O2 Delivery O2 Flow Rate FiO2


 


3/20/18 13:39 36.5 91 20 103/69 (80) 96 Room Air  


 


3/20/18 12:01 36.6 90 22 95/63 (74) 96 Room Air  


 


3/20/18 08:41    107/73 (84)    


 


3/20/18 08:00      Room Air  


 


3/20/18 05:19 37.1 90 16 108/71 (83) 97 Room Air  


 


3/20/18 03:53 36.3 93 24 95/66 99 Room Air  


 


3/20/18 02:55  86 16 110/66 98   


 


3/20/18 02:09  91      


 


3/20/18 02:00  90 18 118/83 98 Room Air  


 


3/20/18 01:00  95 18 121/80 96 Room Air  


 


3/20/18 00:30  98 18 127/84 97 Room Air  


 


3/20/18 00:00 36.8 97 18 125/74 97 Room Air  


 


3/19/18 23:30  102 20 126/75 99 Room Air  


 


3/19/18 23:05  106  154/92 100 Room Air  


 


3/19/18 22:46     99 Room Air  


 


3/19/18 22:46  110      


 


3/19/18 22:34 37.6 112  159/102 98 Room Air  








Lab Results:





Results Past 24 Hours








Test


  3/19/18


22:45 3/19/18


22:56 3/19/18


23:44 3/20/18


00:30 Range/Units


 


 


Prothrombin Time


  13.0


  


  


  


  9.0-12.0


SECONDS


 


Prothromb Time International


Ratio 1.2


  


  


  


  0.9-1.1  


 


 


Activated Partial


Thromboplast Time 24.7


  


  


  


  21.0-31.0


SECONDS


 


Partial Thromboplastin Ratio 1.0     


 


Sodium Level 136    136-145  mmol/L


 


Potassium Level 3.5    3.5-5.1  mmol/L


 


Chloride Level 101      mmol/L


 


Carbon Dioxide Level 23    21-32  mmol/L


 


Anion Gap 12.0    3-11  mmol/L


 


Blood Urea Nitrogen 23    7-18  mg/dl


 


Creatinine


  0.94


  


  


  


  0.60-1.20


mg/dl


 


Est Creatinine Clear Calc


Drug Dose 49.2


  


  


  


   ml/min


 


 


Estimated GFR (


American) 76.4


  


  


  


   


 


 


Estimated GFR (Non-


American 65.9


  


  


  


   


 


 


BUN/Creatinine Ratio 24.7    10-20  


 


Random Glucose 99    70-99  mg/dl


 


Calcium Level 9.0    8.5-10.1  mg/dl


 


Magnesium Level 1.7    1.8-2.4  mg/dl


 


Total Bilirubin 2.0    0.2-1  mg/dl


 


Aspartate Amino Transf


(AST/SGOT) 44


  


  


  


  15-37  U/L


 


 


Alanine Aminotransferase


(ALT/SGPT) 32


  


  


  


  12-78  U/L


 


 


Alkaline Phosphatase 100      U/L


 


Total Protein 6.8    6.4-8.2  gm/dl


 


Albumin 2.8    3.4-5.0  gm/dl


 


Globulin 4.0    2.5-4.0  gm/dl


 


Albumin/Globulin Ratio 0.7    0.9-2  


 


Chemistry Specimen Hemolysis      


 


Bedside Lactic Acid Venous


  


  5.21


  


  


  0.90-1.70


mmol/L


 


White Blood Count   4.36  4.8-10.8  K/uL


 


Red Blood Count   4.19  4.2-5.4  M/uL


 


Hemoglobin   11.8  12.0-16.0  g/dL


 


Hematocrit   34.7  37-47  %


 


Mean Corpuscular Volume   82.8    fL


 


Mean Corpuscular Hemoglobin   28.2  25-34  pg


 


Mean Corpuscular Hemoglobin


Concent 


  


  34.0


  


  32-36  g/dl


 


 


Platelet Count   128  130-400  K/uL


 


Mean Platelet Volume   11.2  7.4-10.4  fL


 


Neutrophils (%) (Auto)   72.5   %


 


Lymphocytes (%) (Auto)   19.7   %


 


Monocytes (%) (Auto)   6.9   %


 


Eosinophils (%) (Auto)   0.7   %


 


Basophils (%) (Auto)   0.0   %


 


Neutrophils # (Auto)   3.16  1.4-6.5  K/uL


 


Lymphocytes # (Auto)   0.86  1.2-3.4  K/uL


 


Monocytes # (Auto)   0.30  0.11-0.59  K/uL


 


Eosinophils # (Auto)   0.03  0-0.5  K/uL


 


Basophils # (Auto)   0.00  0-0.2  K/uL


 


RDW Standard Deviation   38.6  36.4-46.3  fL


 


RDW Coefficient of Variation   12.9  11.5-14.5  %


 


Immature Granulocyte % (Auto)   0.2   %


 


Immature Granulocyte # (Auto)   0.01  0.00-0.02  K/uL


 


Toxic Vacuolation   3+   


 


Dohle Bodies   1+   


 


Large Platelets   1+   


 


Echinocytes   1+   


 


Transferrin % Saturation     15-50  %


 


Test


  3/20/18


00:50 3/20/18


05:56 3/20/18


05:57 3/20/18


07:50 Range/Units


 


 


Absolute Reticulocyte Count


  0.06


  


  


  


  0.02-0.10


10^6/uL


 


Percent Reticulocyte Count 1.6    0.5-2.0  %


 


Lactic Acid Level 3.7 3.3   0.4-2.0  mmol/L


 


Iron Level   16    mcg/dl


 


Total Iron Binding Capacity 290    250-450  mcg/dl


 


Transferrin 226    200-360  mg/dl


 


Ferritin


  74.9


  


  


  


  8.0-388.0


ng/ml


 


Total Creatine Kinase 555  514    U/L


 


Vitamin B12 Level 184    211-911  pg/mL


 


Folate 13.65    >5.38  ng/mL


 


Procalcitonin 73.85    0-0.5  ng/ml


 


Thyroid Stimulating Hormone


(TSH) < 0.005


  


  


  


  0.300-4.500


uIu/ml


 


Free Thyroxine


  3.45


  


  


  


  0.80-1.60


ng/dl


 


Chemistry Specimen Hemolysis      


 


Ethyl Alcohol mg/dL < 3.0    0-3  mg/dl


 


White Blood Count   8.57  4.8-10.8  K/uL


 


Red Blood Count   4.16  4.2-5.4  M/uL


 


Hemoglobin   11.7  12.0-16.0  g/dL


 


Hematocrit   34.6  37-47  %


 


Mean Corpuscular Volume   83.2    fL


 


Mean Corpuscular Hemoglobin   28.1  25-34  pg


 


Mean Corpuscular Hemoglobin


Concent 


  


  33.8


  


  32-36  g/dl


 


 


Platelet Count   132  130-400  K/uL


 


Mean Platelet Volume   12.1  7.4-10.4  fL


 


RDW Standard Deviation   39.6  36.4-46.3  fL


 


RDW Coefficient of Variation   13.1  11.5-14.5  %


 


Neutrophils % (Manual)   82.4   %


 


Lymphocytes % (Manual)   12.3   %


 


Monocytes % (Manual)   5.3   %


 


Neutrophils # (Manual)   7.06  1.4-6.5  K/uL


 


Total Absolute Neutrophils   7.06  1.4-6.5  K/uL


 


Lymphocytes # (Manual)   1.05  1.2-3.4  K/uL


 


Total Absolute Lymphocytes   1.05  1.2-3.4  K/uL


 


Monocytes # (Manual)   0.45  0.11-0.59  K/uL


 


Toxic Vacuolation   3+   


 


Echinocytes   1+   


 


Ammonia   < 10.0  11-32  umol/L


 


Lipase   56    U/L


 


Total Triiodothyronine


  


  


  0.49


  


  0.60-1.81


ng/ml


 


Hepatitis B Surface Antigen   NEG  NEG  


 


Hepatitis C Antibody   NEG  NEG  


 


Urine Color    DK YELLOW  


 


Urine Appearance    CLEAR CLEAR  


 


Urine pH    5.0 4.5-7.5  


 


Urine Specific Gravity    > 1.045 1.000-1.030  


 


Urine Protein    2+ NEG  


 


Urine Glucose (UA)    NEG NEG  


 


Urine Ketones    TRACE NEG  


 


Urine Occult Blood    TRACE NEG  


 


Urine Nitrite    NEG NEG  


 


Urine Bilirubin    NEG NEG  


 


Urine Urobilinogen    NEG NEG  


 


Urine Leukocyte Esterase    NEG NEG  


 


Urine WBC (Auto)    5-10 0-5  /hpf


 


Urine RBC (Auto)    0-4 0-4  /hpf


 


Urine Hyaline Casts (Auto)    5-10 0-5  /lpf


 


Urine Epithelial Cells (Auto)    >30 0-5  /lpf


 


Urine Bacteria (Auto)    NEG NEG  


 


Test


  3/20/18


12:03 


  


  


  Range/Units


 


 


Lactic Acid Level 2.9    0.4-2.0  mmol/L








Microbiology Results


3/19/18 Blood Culture - Preliminary, Resulted


          Gram Negative Bacilli


3/19/18 Blood Culture - Preliminary, Resulted


          Gram Negative Bacilli
Internal Med Progress Note


Date of Service:


Mar 21, 2018.


Provider Documentation:





SUBJECTIVE:





The patient was seen and examined


She is recently moved from [place] without any significant past medical 

history.


She has been complaining of bilateral leg swelling for the last 5 or 6 months.


She has been complaining of more shortness of breath for the last 2 days 

associated with fever and chills.


She also has frequency of urination but no dysuria


She does not feel any better since admission





3/21


Went into AF with RVR last night and was transferred to ICU 


AA this AM but drowsy and less communicative during my exam 


Reverted to SR








OBJECTIVE:





Vital Signs-as noted below





Exam:


General-Not communicating well


Drowsy 


Eyes-normal and closed


ENT-normal


Neck-supple


Lungs-decreased breath sounds both sides without any crackles.


Heart-S1 and S2 regular, no murmur appreciated


Abdomen-Moderately distended, mildly tender, moderate hypogastric tenderness, 

bowel sounds sluggish


Clinically difficult to feel for any acetic fluid


Extremities-bilateral leg edema-1-2+


Edema is more pronounced on the left side along the thighs.


Neuro-alert awake 


Generally weak but no focal neuro deficit.





Lab data as noted below.


ASSESSMENT & PLAN:





More Confused this AM


MRI of the Head-unremarkable 


Normal Ammonia 


Will observe





C Diff Colitis


Seems to be Severe attack 


Likely the cause of Deterioration 


Started on Oral Vancomycin and already on IV Flagyl 





AF with RVR 


Likely secondary to Sepsis 


Contributed by Severely dilated Rt Atrium 


Started on IV Cardizem and IV Heparin 


Reverted to SR 


Increased Troponin likely due to CHF and Rate related 


Doubt any ACS


ECHO as reported 





Severe sepsis-Gm Negative Bacteremia


SIRS plus lactic acidosis


Possible sources :left lower extremity cellulitis,GI (colitis, SBP-ascites on CT

)or UTI


Doxycycline for cellulitis


Cefepime and Flagyl for possible intraabdominal infection if any  


Continue current antibiotics


Blood cultures-Gm Negative Bacilli-sensitivity pending


Urine culture-pending


Clinically worse 





SOB with Clifford Legs Swelling and Bowel edema 


Likely secondary to Right Sided Hearty Failure 


Will give small amount of fluid for ongoing sepsis and low urine output


Cardiology consulted 


ECHO-The right ventricle is moderate to severely dilated.


  *  The right ventricular systolic function is severely reduced.


  *  The right atrium is severely dilated.


  *  There is trace tricuspid regurgitation.


  *  Flattened septum is consistent with RV volume overload.


  *  Left ventricular systolic function is moderately reduced.


  *  Ejection Fraction = 35-40%.


  *  Moderate global hypokinesis of the left ventricle.


  *  The inferior vena cava is severely dilated.


  *  Trivial loculated posterior pericardial effusion.


  *  There are no echocardiographic indications of cardiac tamponade.





BERNABE


Deteriorated overnight


Multifactorial 


Intervascular dehydration, hypotension,Embolic disease,and contrast induced


Will give more IV fluid 


Monitor PRP





Possible  Cirrhosis.


Possibly from RSHF, passive congestion (unknown duration)


Appreciate GI evaluation


GI does not think the patient has Cirrhosis


US abdomen -minimal ascites 








Abnormal Thyroid Function


Likely secondary to Acute illness .Reactive 


Hyperthyroidism (possible Graves disease) new diagnosis-doubt 


Started on Methimazole -stopped today  after discussion with the Intensivist


Will need OP Endocrine follow up 


Cortisone level unremarkable





Chronic anemia, unknown baseline.


Work up 





Thrombocytopenia secondary to sepsis, cirrhosis.


  





DVT prophylaxis, SCDs RE thrombocytopenia.  





Full code.  


Discussed with the Daughter on 3/20/18


Vital Signs:











  Date Time  Temp Pulse Resp B/P (MAP) Pulse Ox O2 Delivery O2 Flow Rate FiO2


 


3/21/18 13:00  61 17 95/58 (65) 100   


 


3/21/18 12:31  61 17 95/58 (65) 100   


 


3/21/18 12:01  61 17 107/61 (82) 100   


 


3/21/18 12:00  60 17  100   


 


3/21/18 12:00 36.3 63 22 95/58 (70) 100 Nasal Cannula 2.0 


 


3/21/18 12:00     95 Nasal Cannula 2.0 


 


3/21/18 11:31  90 4 117/70 (81)    


 


3/21/18 11:10  58 15 118/66 (77)    


 


3/21/18 10:01  60 14 114/62 (83) 99   


 


3/21/18 10:00  61 22  98   


 


3/21/18 10:00  55 18 114/62 (79) 94 Nasal Cannula 2.0 


 


3/21/18 09:00  57 16 110/63 (79) 100 Nasal Cannula 2.0 


 


3/21/18 08:32  58 0 110/68 (91) 100   


 


3/21/18 08:01  59 18 102/64 (71)    


 


3/21/18 08:00 36.7 57 22 107/63 (78) 87 Room Air  


 


3/21/18 08:00     95 Nasal Cannula 2.0 


 


3/21/18 08:00  56 0  93   


 


3/21/18 07:31  63 1 90/64 (72) 76   


 


3/21/18 07:05  66 12 105/63 (76)    


 


3/21/18 07:02  69 19 74/58 (68)    


 


3/21/18 07:00  66 8     


 


3/21/18 07:00  66 8     


 


3/21/18 06:00  71 1  95   


 


3/21/18 05:00  118 25  94   


 


3/21/18 04:00 36.7 125 13 91/55 (67) 94 Room Air  


 


3/21/18 04:00      Room Air  


 


3/21/18 04:00  125 13  94   


 


3/21/18 03:00  132 25  96   


 


3/21/18 02:48  135  119/79    


 


3/21/18 02:00  132 29  95   


 


3/21/18 02:00  132 29 89/68 (75) 95 Room Air  


 


3/21/18 01:30      Room Air  


 


3/21/18 01:04 37.2 142 20  93   


 


3/21/18 00:28  131  103/69 (80)    


 


3/21/18 00:00      Room Air  


 


3/20/18 23:59  129  98/62    


 


3/20/18 23:43  139  99/71 (80)    





    103/65 (78)    


 


3/20/18 23:31  143 18 105/76 (86) 93 Room Air  


 


3/20/18 23:10  155  112/71    


 


3/20/18 22:52 37.2 135 24 120/67 (84) 96 Room Air  


 


3/20/18 19:25 36.8 94 20 118/74 (89) 95 Room Air  


 


3/20/18 19:00      Room Air  


 


3/20/18 16:00 36.5 88 16  96   


 


3/20/18 15:45 36.5 88 16 106/66 (79) 96   


 


3/20/18 15:30      Room Air  








Lab Results:





Results Past 24 Hours








Test


  3/20/18


22:45 3/20/18


23:13 3/21/18


02:38 3/21/18


03:30 Range/Units


 


 


Stool Occult Blood NEGATIVE    NEGATIVE  


 


White Blood Count  16.42   4.8-10.8  K/uL


 


Red Blood Count  3.89   4.2-5.4  M/uL


 


Hemoglobin  10.9   12.0-16.0  g/dL


 


Hematocrit  32.1   37-47  %


 


Mean Corpuscular Volume  82.5     fL


 


Mean Corpuscular Hemoglobin  28.0   25-34  pg


 


Mean Corpuscular Hemoglobin


Concent 


  34.0


  


  


  32-36  g/dl


 


 


RDW Standard Deviation  39.9   36.4-46.3  fL


 


RDW Coefficient of Variation  13.2   11.5-14.5  %


 


Platelet Count  137   130-400  K/uL


 


Mean Platelet Volume  12.0   7.4-10.4  fL


 


Activated Partial


Thromboplast Time 


  45.5


  


  


  21.0-31.0


SECONDS


 


Partial Thromboplastin Ratio  1.8    


 


Sodium Level  135   136-145  mmol/L


 


Potassium Level  4.2   3.5-5.1  mmol/L


 


Chloride Level  105     mmol/L


 


Carbon Dioxide Level  19   21-32  mmol/L


 


Anion Gap  11.0   3-11  mmol/L


 


Blood Urea Nitrogen  39   7-18  mg/dl


 


Creatinine


  


  1.95


  


  


  0.60-1.20


mg/dl


 


Est Creatinine Clear Calc


Drug Dose 


  23.8


  


  


   ml/min


 


 


Estimated GFR (


American) 


  31.6


  


  


   


 


 


Estimated GFR (Non-


American 


  27.3


  


  


   


 


 


BUN/Creatinine Ratio  19.9   10-20  


 


Random Glucose  101   70-99  mg/dl


 


Lactic Acid Level  2.6   0.4-2.0  mmol/L


 


Calcium Level  8.6   8.5-10.1  mg/dl


 


Magnesium Level  1.9   1.8-2.4  mg/dl


 


Total Bilirubin  1.2   0.2-1  mg/dl


 


Aspartate Amino Transf


(AST/SGOT) 


  47


  


  


  15-37  U/L


 


 


Alanine Aminotransferase


(ALT/SGPT) 


  33


  


  


  12-78  U/L


 


 


Alkaline Phosphatase  58     U/L


 


Troponin I  2.980   0-0.045  ng/ml


 


Total Protein  5.7   6.4-8.2  gm/dl


 


Albumin  2.4   3.4-5.0  gm/dl


 


Globulin  3.3   2.5-4.0  gm/dl


 


Albumin/Globulin Ratio  0.7   0.9-2  


 


Bedside Glucose   97  70-90  mg/dl


 


Urine Opiates Screen    POS NEG  


 


Urine Methadone, Qualitative    NEG NEG  


 


Urine Barbiturates    NEG NEG  


 


Urine Phencyclidine (PCP)


Level 


  


  


  NEG


  NEG  


 


 


Ur


Amphetamine/Methamphetamine 


  


  


  NEG


  NEG  


 


 


MDMA (Ecstasy) Screen    NEG NEG  


 


Urine Benzodiazepines Screen    NEG NEG  


 


Urine Cocaine Metabolite    NEG NEG  


 


Urine Marijuana (THC)    NEG NEG  


 


Test


  3/21/18


04:44 3/21/18


05:53 3/21/18


06:17 3/21/18


08:38 Range/Units


 


 


Creatine Kinase MB Ratio  3.8   0-3.0  


 


White Blood Count  18.13   4.8-10.8  K/uL


 


Red Blood Count  4.46   4.2-5.4  M/uL


 


Hemoglobin  12.3   12.0-16.0  g/dL


 


Hematocrit  37.2   37-47  %


 


Mean Corpuscular Volume  83.4     fL


 


Mean Corpuscular Hemoglobin  27.6   25-34  pg


 


Mean Corpuscular Hemoglobin


Concent 


  33.1


  


  


  32-36  g/dl


 


 


RDW Standard Deviation  39.8   36.4-46.3  fL


 


RDW Coefficient of Variation  13.2   11.5-14.5  %


 


Platelet Count  140   130-400  K/uL


 


Mean Platelet Volume  12.1   7.4-10.4  fL


 


Activated Partial


Thromboplast Time 


  56.7


  


  52.9


  21.0-31.0


SECONDS


 


Partial Thromboplastin Ratio  2.2  2.0  


 


Sodium Level  133   136-145  mmol/L


 


Potassium Level  4.8   3.5-5.1  mmol/L


 


Chloride Level  105     mmol/L


 


Carbon Dioxide Level  17   21-32  mmol/L


 


Anion Gap  11.0   3-11  mmol/L


 


Blood Urea Nitrogen  42   7-18  mg/dl


 


Creatinine


  


  2.26


  


  


  0.60-1.20


mg/dl


 


Est Creatinine Clear Calc


Drug Dose 


  22.7


  


  


   ml/min


 


 


Estimated GFR (


American) 


  26.5


  


  


   


 


 


Estimated GFR (Non-


American 


  22.8


  


  


   


 


 


BUN/Creatinine Ratio  18.7   10-20  


 


Random Glucose  92   70-99  mg/dl


 


Lactic Acid Level  2.4   0.4-2.0  mmol/L


 


Calcium Level  8.9   8.5-10.1  mg/dl


 


Ionized Calcium


  


  1.22


  


  


  1.12-1.32


mmol/l


 


Phosphorus Level  4.3   2.5-4.9  mg/dl


 


Magnesium Level  2.6   1.8-2.4  mg/dl


 


Total Bilirubin  1.5   0.2-1  mg/dl


 


Aspartate Amino Transf


(AST/SGOT) 


  52


  


  


  15-37  U/L


 


 


Alanine Aminotransferase


(ALT/SGPT) 


  33


  


  


  12-78  U/L


 


 


Alkaline Phosphatase  62     U/L


 


Ammonia  < 10.0   11-32  umol/L


 


Total Creatine Kinase  187     U/L


 


Creatine Kinase MB  7.1   0.5-3.6  ng/ml


 


Troponin I  1.930   0-0.045  ng/ml


 


Total Protein  6.2   6.4-8.2  gm/dl


 


Albumin  2.7   3.4-5.0  gm/dl


 


Globulin  3.5   2.5-4.0  gm/dl


 


Albumin/Globulin Ratio  0.8   0.9-2  


 


Procalcitonin  > 200.00   0-0.5  ng/ml


 


Random Cortisol  59.81    mcg/dl


 


Bedside Glucose   84  70-90  mg/dl


 


Test


  3/21/18


14:01 


  


  


  Range/Units


 








Microbiology Results


3/21/18 MRSA DNA Surveillance Screen - Final, Complete


          Specimen Negative for MRSA by DNA Probe


3/20/18 C.difficile Toxin B Gene (PCR) - Final, Complete


          Positive for C. difficile toxin B gene


3/21/18 Urine Culture, Received


          Pending
Internal Med Progress Note


Date of Service:


Mar 22, 2018.


Provider Documentation:





SUBJECTIVE:





The patient was seen and examined


She is recently moved from [place] without any significant past medical 

history.


She has been complaining of bilateral leg swelling for the last 5 or 6 months.


She has been complaining of more shortness of breath for the last 2 days 

associated with fever and chills.


She also has frequency of urination but no dysuria


She does not feel any better since admission





3/21


Went into AF with RVR last night and was transferred to ICU 


AA this AM but drowsy and less communicative during my exam 


Reverted to SR]





3/22


Confused


Trying to communicate 


Very  weak and lethargic








OBJECTIVE:





Vital Signs-as noted below





Exam:


General-Very confused 


Drowsy 


Eyes-normal 


ENT-normal


Neck-supple


Lungs-decreased breath sounds both sides without any crackles.


Heart-S1 and S2 regular, no murmur appreciated


Abdomen-Moderately distended, moderately  tender, bowel sounds sluggish


Extremities-bilateral leg edema-1-2+


Edema is more pronounced on the left side along the thighs.


Neuro-alert awake 


Generally weak but no focal neuro deficit.





Lab data as noted below.


ASSESSMENT & PLAN:





Metabolic Encephalopathy


Multifactorial -sepsis,Hypotensive episode,Hypoxemia and Renal impairment 


Remained Confused 


MRI of the Head-unremarkable 


Ammonia -normal


Appreciate Neurology input








C Diff Colitis


Seems to be Severe attack 


Likely the cause of Deterioration 


Started on Oral Vancomycin and already on IV Flagyl 


May need Repeat CT of the Abd/Pel to r/o toxic megacolon/perforation 





AF with RVR 


Likely secondary to Sepsis 


Contributed by Severely dilated Rt Atrium 


Started on IV Cardizem and IV Heparin 


Reverted to SR 


Increased Troponin likely due to CHF and Rate related 


Doubt any ACS


ECHO as reported 





Severe sepsis-Gm Negative Bacteremia


SIRS plus lactic acidosis


Possible sources :left lower extremity cellulitis,GI (colitis, SBP-ascites on CT

)or UTI


Doxycycline for cellulitis


Cefepime and Flagyl for possible intraabdominal infection if any  


Continue current antibiotics


Blood cultures-Gm Negative Bacilli-sensitivity pending


Urine culture-pending


Clinically worse 


Appreciate ID input 





SOB with Clifford Legs Swelling and Bowel edema 


Likely secondary to Right Sided Hearty Failure 


Will give small amount of fluid for ongoing sepsis and low urine output


Cardiology consulted 


ECHO-The right ventricle is moderate to severely dilated.


  *  The right ventricular systolic function is severely reduced.


  *  The right atrium is severely dilated.


  *  There is trace tricuspid regurgitation.


  *  Flattened septum is consistent with RV volume overload.


  *  Left ventricular systolic function is moderately reduced.


  *  Ejection Fraction = 35-40%.


  *  Moderate global hypokinesis of the left ventricle.


  *  The inferior vena cava is severely dilated.


  *  Trivial loculated posterior pericardial effusion.


  *  There are no echocardiographic indications of cardiac tamponade.





BERNABE


Deteriorated overnight


Multifactorial 


Intervascular dehydration, hypotension,Embolic disease,and contrast induced


Will give more IV fluid 


Monitor PRP-worsening Kidney function





Possible  Cirrhosis.


Possibly from RSHF, passive congestion (unknown duration)


Appreciate GI evaluation


GI does not think the patient has Cirrhosis


US abdomen -minimal ascites 








Abnormal Thyroid Function


Likely secondary to Acute illness .Reactive 


Hyperthyroidism (possible Graves disease) new diagnosis-doubt 


Started on Methimazole -stopped today  after discussion with the Intensivist


Will need OP Endocrine follow up 


Cortisone level unremarkable





Chronic anemia, unknown baseline.


Work up 





Thrombocytopenia secondary to sepsis, cirrhosis.


  





DVT prophylaxis, SCDs RE thrombocytopenia.  





Full code.  


Discussed with the Daughter on 3/20/18 and 3/21


Vital Signs:











  Date Time  Temp Pulse Resp B/P (MAP) Pulse Ox O2 Delivery O2 Flow Rate FiO2


 


3/22/18 12:00  73 22 134/66 (88) 96 Nasal Cannula 2.0 


 


3/22/18 12:00     95 Room Air  


 


3/22/18 10:00  76 20 141/95 (110) 95 Nasal Cannula 2.0 


 


3/22/18 08:00 37.0 68 17 144/77 (99) 94 Nasal Cannula 2.0 


 


3/22/18 08:00      Room Air  


 


3/22/18 06:00  69 15 135/72 (93) 95 Nasal Cannula 2.0 


 


3/22/18 04:00     96 Nasal Cannula 2.0 


 


3/22/18 04:00 36.8 70 20 136/63 (87) 96 Nasal Cannula 2.0 


 


3/22/18 02:00  67 16 109/63 (78) 96 Nasal Cannula 2.0 


 


3/22/18 00:01 36.8 70 20 117/66 (83) 94 Nasal Cannula 2.0 


 


3/21/18 23:59     94 Nasal Cannula 2.0 


 


3/21/18 22:00  65 20 123/74 (90) 96 Nasal Cannula 2.0 


 


3/21/18 20:00 36.5 63 20 123/72 (89) 100 Nasal Cannula 2.0 


 


3/21/18 20:00     100 Nasal Cannula 2.0 


 


3/21/18 16:00     94 Nasal Cannula 2.0 


 


3/21/18 16:00  61 18 116/62 (80) 100 Nasal Cannula 2.0 








Lab Results:





Results Past 24 Hours








Test


  3/21/18


20:47 3/22/18


05:23 3/22/18


05:31 Range/Units


 


 


Bedside Glucose 85  78 70-90  mg/dl


 


White Blood Count  20.45  4.8-10.8  K/uL


 


Red Blood Count  3.76  4.2-5.4  M/uL


 


Hemoglobin  10.5  12.0-16.0  g/dL


 


Hematocrit  31.0  37-47  %


 


Mean Corpuscular Volume  82.4    fL


 


Mean Corpuscular Hemoglobin  27.9  25-34  pg


 


Mean Corpuscular Hemoglobin


Concent 


  33.9


  


  32-36  g/dl


 


 


Platelet Count  182  130-400  K/uL


 


Mean Platelet Volume  11.5  7.4-10.4  fL


 


Neutrophils (%) (Auto)  77.9   %


 


Lymphocytes (%) (Auto)  11.1   %


 


Monocytes (%) (Auto)  9.8   %


 


Eosinophils (%) (Auto)  0.1   %


 


Basophils (%) (Auto)  0.1   %


 


Neutrophils # (Auto)  15.92  1.4-6.5  K/uL


 


Lymphocytes # (Auto)  2.28  1.2-3.4  K/uL


 


Monocytes # (Auto)  2.01  0.11-0.59  K/uL


 


Eosinophils # (Auto)  0.02  0-0.5  K/uL


 


Basophils # (Auto)  0.02  0-0.2  K/uL


 


RDW Standard Deviation  40.8  36.4-46.3  fL


 


RDW Coefficient of Variation  13.4  11.5-14.5  %


 


Immature Granulocyte % (Auto)  1.0   %


 


Immature Granulocyte # (Auto)  0.20  0.00-0.02  K/uL


 


Nucleated RBC Absolute Count


(auto) 


  0.07


  


  0-0  K/uL


 


 


Nucleated Red Blood Cells %  0.4   %


 


Activated Partial


Thromboplast Time 


  55.3


  


  21.0-31.0


SECONDS


 


Partial Thromboplastin Ratio  2.1   


 


Sodium Level  132  136-145  mmol/L


 


Potassium Level  4.7  3.5-5.1  mmol/L


 


Chloride Level  106    mmol/L


 


Carbon Dioxide Level  15  21-32  mmol/L


 


Anion Gap  11.0  3-11  mmol/L


 


Blood Urea Nitrogen  65  7-18  mg/dl


 


Creatinine


  


  3.27


  


  0.60-1.20


mg/dl


 


Est Creatinine Clear Calc


Drug Dose 


  15.8


  


   ml/min


 


 


Estimated GFR (


American) 


  16.9


  


   


 


 


Estimated GFR (Non-


American 


  14.6


  


   


 


 


BUN/Creatinine Ratio  19.9  10-20  


 


Random Glucose  82  70-99  mg/dl


 


Lactic Acid Level  1.5  0.4-2.0  mmol/L


 


Calcium Level  8.8  8.5-10.1  mg/dl


 


Total Bilirubin  1.0  0.2-1  mg/dl


 


Aspartate Amino Transf


(AST/SGOT) 


  51


  


  15-37  U/L


 


 


Alanine Aminotransferase


(ALT/SGPT) 


  46


  


  12-78  U/L


 


 


Alkaline Phosphatase  65    U/L


 


Troponin I  1.300  0-0.045  ng/ml


 


Total Protein  6.4  6.4-8.2  gm/dl


 


Albumin  2.9  3.4-5.0  gm/dl


 


Globulin  3.5  2.5-4.0  gm/dl


 


Albumin/Globulin Ratio  0.8  0.9-2  


 


Procalcitonin  > 200.00  0-0.5  ng/ml








Microbiology Results


3/22/18 Blood Culture, Ordered


          Pending


3/22/18 Blood Culture, Ordered


          Pending
Internal Med Progress Note


Date of Service:


Mar 23, 2018.


Provider Documentation:





SUBJECTIVE:





The patient was seen and examined


She is recently moved from [place] without any significant past medical 

history.


She has been complaining of bilateral leg swelling for the last 5 or 6 months.


She has been complaining of more shortness of breath for the last 2 days 

associated with fever and chills.


She also has frequency of urination but no dysuria


She does not feel any better since admission





3/21


Went into AF with RVR last night and was transferred to ICU 


AA this AM but drowsy and less communicative during my exam 


Reverted to SR]





3/22


Confused


Trying to communicate 


Very  weak and lethargic


 


3/23


Worse this am but much better this afternoon


Talking almost normally 


Discussed with the family members





OBJECTIVE:





Vital Signs-as noted below





Exam:


General-AA,pleasantly confused


Drowsiness improves


Eyes-normal 


ENT-normal


Neck-supple


Lungs-decreased breath sounds both sides without any crackles.


Heart-S1 and S2 regular, no murmur appreciated


Abdomen-Moderately distended, moderately  tender, bowel sounds sluggish


Extremities-bilateral leg edema-1-2+


Edema is more pronounced on the left side along the thighs.


Neuro-alert awake 


Generally weak but no focal neuro deficit.





Lab data as noted below.


ASSESSMENT & PLAN:





Metabolic Encephalopathy


Multifactorial -sepsis,Hypotensive episode,Hypoxemia and Renal impairment 


Remained pleasantly confused-a little better today  


MRI of the Head-unremarkable 


Ammonia -normal


Appreciate Neurology input


Clinically better now 








C Diff Colitis


Seems to be Severe attack 


Likely the cause of Deterioration 


Started on Oral Vancomycin and already on IV Flagyl 


May need Repeat CT of the Abd/Pel to r/o toxic megacolon/perforation-reasonably 

OK 


Appreciate Surgery input-no indication for surgery 





AF with RVR -recurrent 


Likely secondary to Sepsis 


Contributed by Severely dilated Rt Atrium 


Started on IV Cardizem and IV Heparin 


Reverted to SR 


Increased Troponin likely due to CHF and Rate related 


Doubt any ACS


ECHO as reported 


Sinus pauses on Cardizem drip-NO MORE CARDIZEM


May use BB 


may require Amiodarone 





Severe sepsis-Gm Negative Bacteremia


SIRS plus lactic acidosis


Possible sources :left lower extremity cellulitis,GI (colitis, SBP-ascites on CT

)or UTI


Doxycycline for cellulitis


Cefepime and Flagyl for possible intraabdominal infection if any  


Continue current antibiotics


Blood cultures-Gm Negative Bacilli-sensitivity pending


Urine culture-pending


Clinically worse 


Appreciate ID input-continue current medication 





SOB with Clifford Legs Swelling and Bowel edema 


Likely secondary to Right Sided Hearty Failure 


Will give small amount of fluid for ongoing sepsis and low urine output


Cardiology consulted 


ECHO-The right ventricle is moderate to severely dilated.


  *  The right ventricular systolic function is severely reduced.


  *  The right atrium is severely dilated.


  *  There is trace tricuspid regurgitation.


  *  Flattened septum is consistent with RV volume overload.


  *  Left ventricular systolic function is moderately reduced.


  *  Ejection Fraction = 35-40%.


  *  Moderate global hypokinesis of the left ventricle.


  *  The inferior vena cava is severely dilated.


  *  Trivial loculated posterior pericardial effusion.


  *  There are no echocardiographic indications of cardiac tamponade.





BERNABE


Deteriorated overnight


Multifactorial 


Intervascular dehydration, hypotension,Embolic disease,and contrast induced


Will give more IV fluid 


Monitor PRP-worsening Kidney function


A Liitel better-not for Dialysis yet 





Possible  Cirrhosis.


Possibly from RSHF, passive congestion (unknown duration)


Appreciate GI evaluation


GI does not think the patient has Cirrhosis


US abdomen -minimal ascites 








Abnormal Thyroid Function


Likely secondary to Acute illness .Reactive 


Hyperthyroidism (possible Graves disease) new diagnosis-doubt 


Started on Methimazole -stopped today  after discussion with the Intensivist


Will need OP Endocrine follow up 


Cortisone level unremarkable





Chronic anemia, unknown baseline.


Work up 





Thrombocytopenia secondary to sepsis, cirrhosis.


  





DVT prophylaxis, SCDs RE thrombocytopenia.  





Full code.  


Discussed with the Daughter on 3/20/18 and 3/21 and 3/23


Vital Signs:











  Date Time  Temp Pulse Resp B/P (MAP) Pulse Ox O2 Delivery O2 Flow Rate FiO2


 


3/23/18 16:00      Oxymask 5.0 


 


3/23/18 16:00 36.5 80 16 177/87 (117) 94 Oxymask 5.0 


 


3/23/18 14:00  125 24 138/86 (103) 94 Oxymask 5.0 


 


3/23/18 13:01  114 23 172/89 (116) 92 Oxymask 5.0 


 


3/23/18 13:00  126 25  93   


 


3/23/18 12:50  106 23 175/83 (113) 94   


 


3/23/18 12:46  119 25  92 Oxymask 5.0 


 


3/23/18 12:31  127 24  90   


 


3/23/18 12:30  121 22  90 Oxymask 5.0 


 


3/23/18 12:16  132 21  92   


 


3/23/18 12:01 36.7 134 22  93 Oxymask 5.0 


 


3/23/18 12:00      Oxymask 5.0 


 


3/23/18 12:00  124   92   


 


3/23/18 11:47  124 22  92 Oxymask 5.0 


 


3/23/18 11:31  132 30  91   


 


3/23/18 11:30  140 19  92   


 


3/23/18 11:16  143 31  90 Oxymask 5.0 


 


3/23/18 11:01  98 18  85   


 


3/23/18 11:00  117 36  90 Oxymask 5.0 


 


3/23/18 10:31  136 20  91   


 


3/23/18 10:30  133 23  92 Oxymask 5.0 


 


3/23/18 10:16  164 29  92   


 


3/23/18 10:13  121 27  92 Oxymask 5.0 


 


3/23/18 10:09  109 25     


 


3/23/18 09:31  111 22  98 Oxymask 5.0 


 


3/23/18 09:30  112 23  100   


 


3/23/18 09:16  129 24  91 Oxymask 5.0 


 


3/23/18 09:01  131 26  94   


 


3/23/18 09:00  119 31  94 Oxymask 5.0 


 


3/23/18 08:46  126 31  91   


 


3/23/18 08:30  147 25  91 Oxymask 5.0 


 


3/23/18 08:16  115 28     


 


3/23/18 08:15      Oxymask 5.0 


 


3/23/18 08:02  105 28  93 Oxymask 5.0 


 


3/23/18 08:01  100 28  90   


 


3/23/18 08:00  107 27 158/76 (103) 92 Oxymask 5.0 


 


3/23/18 07:45 36.6       


 


3/23/18 07:30  84 22  100   


 


3/23/18 07:01  80 20  98 Oxymask 5.0 


 


3/23/18 07:00  79 16  98   


 


3/23/18 06:00  80 20 184/95 (124) 97 Oxymask 5.0 


 


3/23/18 04:00 36.5 77 14 164/79 (107) 95 Oxymask 5.0 


 


3/23/18 04:00      Oxymask 5.0 


 


3/23/18 02:00  80 22 192/109 (136) 95 Oxymask 4.0 


 


3/23/18 00:01 36.4 73 18 174/99 (124) 95 Oxymask 4.0 


 


3/22/18 23:59      Oxymask 4.0 


 


3/22/18 22:00  79 17 115/84 (94) 97 Oxymask 4.0 


 


3/22/18 20:00 36.5 70 19 153/89 (110) 94 Oxymask 4.0 


 


3/22/18 20:00     94 Oxymask 4.0 


 


3/22/18 18:00  71 26 161/95 (117) 95 Nasal Cannula 6.0 








Lab Results:





Results Past 24 Hours








Test


  3/22/18


21:43 3/23/18


05:32 3/23/18


05:36 3/23/18


10:47 Range/Units


 


 


Bedside Glucose 96 105   70-90  mg/dl


 


White Blood Count   22.95  4.8-10.8  K/uL


 


Red Blood Count   3.96  4.2-5.4  M/uL


 


Hemoglobin   11.2  12.0-16.0  g/dL


 


Hematocrit   32.7  37-47  %


 


Mean Corpuscular Volume   82.6    fL


 


Mean Corpuscular Hemoglobin   28.3  25-34  pg


 


Mean Corpuscular Hemoglobin


Concent 


  


  34.3


  


  32-36  g/dl


 


 


RDW Standard Deviation   41.6  36.4-46.3  fL


 


RDW Coefficient of Variation   13.7  11.5-14.5  %


 


Platelet Count   209  130-400  K/uL


 


Mean Platelet Volume   11.9  7.4-10.4  fL


 


Nucleated RBC Absolute Count


(auto) 


  


  0.08


  


  0-0  K/uL


 


 


Nucleated Red Blood Cells %   0.4   %


 


Prothrombin Time


  


  


  13.0


  


  9.0-12.0


SECONDS


 


Prothromb Time International


Ratio 


  


  1.2


  


  0.9-1.1  


 


 


Activated Partial


Thromboplast Time 


  


  41.7


  


  21.0-31.0


SECONDS


 


Partial Thromboplastin Ratio   1.6   


 


Arterial Blood pH   7.31  7.35-7.45  


 


Arterial Blood Partial


Pressure CO2 


  


  33


  


  35-46  mmHg


 


 


Arterial Blood Partial


Pressure O2 


  


  76


  


  80-95  mm/Hg


 


 


Arterial Blood HCO3   16  19-24  mmol/L


 


Arterial Blood Oxygen


Saturation 


  


  93.4


  


  90-95  %


 


 


Arterial Blood Base Excess   -9.4  -9-1.8  mEq/L


 


Arterial Blood Gas Delivery   5L   


 


Samm Test   POS  POS  


 


Sodium Level   134  136-145  mmol/L


 


Potassium Level   4.5  3.5-5.1  mmol/L


 


Chloride Level   106    mmol/L


 


Carbon Dioxide Level   16  21-32  mmol/L


 


Anion Gap   12.0  3-11  mmol/L


 


Blood Urea Nitrogen   73  7-18  mg/dl


 


Creatinine


  


  


  2.62


  


  0.60-1.20


mg/dl


 


Est Creatinine Clear Calc


Drug Dose 


  


  19.7


  


   ml/min


 


 


Estimated GFR (


American) 


  


  22.1


  


   


 


 


Estimated GFR (Non-


American 


  


  19.1


  


   


 


 


BUN/Creatinine Ratio   27.7  10-20  


 


Random Glucose   101  70-99  mg/dl


 


Calcium Level   8.8  8.5-10.1  mg/dl


 


Total Bilirubin   1.0  0.2-1  mg/dl


 


Aspartate Amino Transf


(AST/SGOT) 


  


  44


  


  15-37  U/L


 


 


Alanine Aminotransferase


(ALT/SGPT) 


  


  43


  


  12-78  U/L


 


 


Alkaline Phosphatase   96    U/L


 


Troponin I   0.742  0-0.045  ng/ml


 


Total Protein   6.8  6.4-8.2  gm/dl


 


Albumin   3.3  3.4-5.0  gm/dl


 


Globulin   3.5  2.5-4.0  gm/dl


 


Albumin/Globulin Ratio   0.9  0.9-2  


 


Procalcitonin   93.42  0-0.5  ng/ml


 


Lactic Acid Level    1.7 0.4-2.0  mmol/L


 


Test


  3/23/18


11:50 3/23/18


12:57 


  


  Range/Units


 


 


Bedside Glucose 112    70-90  mg/dl


 


Sodium Level  138   136-145  mmol/L


 


Potassium Level  4.2   3.5-5.1  mmol/L


 


Chloride Level  109     mmol/L


 


Carbon Dioxide Level  19   21-32  mmol/L


 


Anion Gap  10.0   3-11  mmol/L


 


Blood Urea Nitrogen  73   7-18  mg/dl


 


Creatinine


  


  2.19


  


  


  0.60-1.20


mg/dl


 


Est Creatinine Clear Calc


Drug Dose 


  23.6


  


  


   ml/min


 


 


Estimated GFR (


American) 


  27.5


  


  


   


 


 


Estimated GFR (Non-


American 


  23.7


  


  


   


 


 


BUN/Creatinine Ratio  33.1   10-20  


 


Random Glucose  110   70-99  mg/dl


 


Calcium Level  8.8   8.5-10.1  mg/dl


 


Phosphorus Level  4.5   2.5-4.9  mg/dl


 


Magnesium Level  3.0   1.8-2.4  mg/dl
Internal Med Progress Note


Date of Service:


Mar 24, 2018.


Provider Documentation:





SUBJECTIVE:





The patient was seen and examined


She is recently moved from [place] without any significant past medical 

history.


She has been complaining of bilateral leg swelling for the last 5 or 6 months.


She has been complaining of more shortness of breath for the last 2 days 

associated with fever and chills.


She also has frequency of urination but no dysuria


She does not feel any better since admission





3/21


Went into AF with RVR last night and was transferred to ICU 


AA this AM but drowsy and less communicative during my exam 


Reverted to SR]





3/22


Confused


Trying to communicate 


Very  weak and lethargic


 


3/23


Worse this am but much better this afternoon


Talking almost normally 


Discussed with the family members





3/24


Clinically a little better


Trying to communicate 





OBJECTIVE:





Vital Signs-as noted below





Exam:


General-AA,pleasantly confused


Drowsiness improves


Remains shaky


Eyes-normal 


ENT-normal


Neck-supple


Lungs-decreased breath sounds both sides without any crackles.


Heart-S1 and S2 regular, no murmur appreciated


Abdomen-Moderately distended, moderately  tender, bowel sounds sluggish


Extremities-bilateral leg edema-1-2+


Edema is more pronounced on the left side along the thighs.


Neuro-alert awake,pleasantly confused 


Generally weak 





Lab data as noted below.


ASSESSMENT & PLAN:





Metabolic Encephalopathy


Multifactorial -sepsis,Hypotensive episode,Hypoxemia and Renal impairment 


Remained pleasantly confused-a little better today  


MRI of the Head-unremarkable 


Ammonia -normal


Appreciate Neurology input


Clinically a little better today 


Remains pleasantly confused








C Diff Colitis


Seems to be Severe attack 


Likely the cause of Deterioration 


Started on Oral Vancomycin and already on IV Flagyl 


May need Repeat CT of the Abd/Pel to r/o toxic megacolon/perforation-reasonably 

OK 


Appreciate Surgery input-no indication for surgery 


Abdominal fullness and tenderness are better 





AF with RVR -recurrent 


Likely secondary to Sepsis 


Contributed by Severely dilated Rt Atrium 


Started on IV Cardizem and IV Heparin 


Reverted to SR 


Increased Troponin likely due to CHF and Rate related 


Doubt any ACS


ECHO as reported 


Sinus pauses on Cardizem drip-NO MORE CARDIZEM


May use BB 


may require Amiodarone 


Reasonably controlled with BB





Severe sepsis-Gm Negative Bacteremia


SIRS plus lactic acidosis


Possible sources :left lower extremity cellulitis,GI (colitis, SBP-ascites on CT

)or UTI


Doxycycline for cellulitis


Cefepime and Flagyl for possible intraabdominal infection if any  


Continue current antibiotics


Blood cultures-Gm Negative Bacilli-sensitivity pending


Urine culture-pending


Clinically worse 


Appreciate ID input-continue current medication


WCC is improving ,repeat Blood cultures -negative 





SOB with Clifford Legs Swelling and Bowel edema 


Likely secondary to Right Sided Hearty Failure 


Will give small amount of fluid for ongoing sepsis and low urine output


Cardiology consulted 


ECHO-The right ventricle is moderate to severely dilated.


  *  The right ventricular systolic function is severely reduced.


  *  The right atrium is severely dilated.


  *  There is trace tricuspid regurgitation.


  *  Flattened septum is consistent with RV volume overload.


  *  Left ventricular systolic function is moderately reduced.


  *  Ejection Fraction = 35-40%.


  *  Moderate global hypokinesis of the left ventricle.


  *  The inferior vena cava is severely dilated.


  *  Trivial loculated posterior pericardial effusion.


  *  There are no echocardiographic indications of cardiac tamponade.





BERNABE


Deteriorated overnight


Multifactorial 


Intervascular dehydration, hypotension,Embolic disease,and contrast induced


Will give more IV fluid 


Monitor PRP-worsening Kidney function


A Little better-not for Dialysis yet 


Renal function is Normalized 





HTN


On BB,Hydralazine and need small dose of Nitro





Possible  Cirrhosis.


Possibly from RSHF, passive congestion (unknown duration)


Appreciate GI evaluation


GI does not think the patient has Cirrhosis


US abdomen -minimal ascites 








Abnormal Thyroid Function


Likely secondary to Acute illness .Reactive 


Hyperthyroidism (possible Graves disease) new diagnosis-doubt 


Started on Methimazole -stopped today  after discussion with the Intensivist


Will need OP Endocrine follow up 


Cortisone level unremarkable





Chronic anemia, unknown baseline.


Work up 





Thrombocytopenia secondary to sepsis, cirrhosis.


  





DVT prophylaxis, SCDs RE thrombocytopenia.  





Full code.  


Discussed with the Daughter on 3/20/18 and 3/21 and 3/23


Vital Signs:











  Date Time  Temp Pulse Resp B/P (MAP) Pulse Ox O2 Delivery O2 Flow Rate FiO2


 


3/24/18 10:31  96 12 164/81 (108) 95 Nasal Cannula 3.0 


 


3/24/18 10:30  95 5  95   


 


3/24/18 10:01  98 19 171/83 (112) 96 Nasal Cannula 3.0 


 


3/24/18 10:00  98 12  95   


 


3/24/18 09:31  100 22 167/80 (109) 95 Nasal Cannula 3.0 


 


3/24/18 09:30  101 22  95 Nasal Cannula 3.0 


 


3/24/18 09:00  101 20 167/80 (109) 94 Nasal Cannula 3.0 


 


3/24/18 09:00  101 20  94   


 


3/24/18 08:31  93 22 188/96 (126) 98 Oxymask  


 


3/24/18 08:00  93 21  96 Oxymask  


 


3/24/18 08:00     96 Oxymask 5.0 


 


3/24/18 07:31  92 20 178/97 (124) 96 Oxymask  


 


3/24/18 07:00  90 24  96 Oxymask  


 


3/24/18 06:01  87 21 166/85 (112) 96 Oxymask 5.0 


 


3/24/18 05:42  97  171/94    


 


3/24/18 05:31  97 21 171/94 (119) 97   


 


3/24/18 05:01  97 25 175/89 (117) 97   


 


3/24/18 04:31  96 8 161/83 (109) 91   


 


3/24/18 04:01 36.9 100 13 164/78 (106) 95 Oxymask 5.0 


 


3/24/18 04:00      Oxymask 5.0 


 


3/24/18 03:31  107 17 173/94 (120) 94   


 


3/24/18 03:01  103 25 178/73 (108) 95   


 


3/24/18 02:31  92 23 190/79 (116) 91   


 


3/24/18 02:02  91 29 183/92 (122) 91   


 


3/24/18 01:32  90 28 166/79 (108) 92   


 


3/24/18 01:01  87 23 174/83 (113) 91   


 


3/24/18 00:01 36.6 83 4 164/79 (107) 95 Oxymask 5.0 


 


3/23/18 23:59      Oxymask 5.0 


 


3/23/18 23:33  85 20 157/121 (133) 96  5.0 


 


3/23/18 23:28  96  178/82    


 


3/23/18 23:16  95 9 178/82 (114) 89  5.0 


 


3/23/18 23:01  95 30 182/81 (114) 96  5.0 


 


3/23/18 22:00  82 18 183/98 (126) 98 Oxymask 5.0 


 


3/23/18 20:00      Oxymask 5.0 


 


3/23/18 20:00 36.8 76 17 162/84 (110) 95 Oxymask 5.0 


 


3/23/18 18:00  74 15 165/86 (112) 97 Oxymask 5.0 


 


3/23/18 17:10  80  177/87    


 


3/23/18 16:00      Oxymask 5.0 


 


3/23/18 16:00 36.5 80 16 177/87 (117) 94 Oxymask 5.0 


 


3/23/18 14:00  125 24 138/86 (103) 94 Oxymask 5.0 


 


3/23/18 13:01  114 23 172/89 (116) 92 Oxymask 5.0 


 


3/23/18 13:00  126 25  93   


 


3/23/18 12:50  106 23 175/83 (113) 94   


 


3/23/18 12:46  119 25  92 Oxymask 5.0 


 


3/23/18 12:31  127 24  90   


 


3/23/18 12:30  121 22  90 Oxymask 5.0 


 


3/23/18 12:16  132 21  92   


 


3/23/18 12:01 36.7 134 22  93 Oxymask 5.0 


 


3/23/18 12:00      Oxymask 5.0 


 


3/23/18 12:00  124   92   


 


3/23/18 11:47  124 22  92 Oxymask 5.0 








Lab Results:





Results Past 24 Hours








Test


  3/23/18


11:50 3/23/18


12:57 3/23/18


18:08 3/23/18


18:23 Range/Units


 


 


Bedside Glucose 112   124 70-90  mg/dl


 


Sodium Level  138   136-145  mmol/L


 


Potassium Level  4.2   3.5-5.1  mmol/L


 


Chloride Level  109     mmol/L


 


Carbon Dioxide Level  19   21-32  mmol/L


 


Anion Gap  10.0   3-11  mmol/L


 


Blood Urea Nitrogen  73   7-18  mg/dl


 


Creatinine


  


  2.19


  


  


  0.60-1.20


mg/dl


 


Est Creatinine Clear Calc


Drug Dose 


  23.6


  


  


   ml/min


 


 


Estimated GFR (


American) 


  27.5


  


  


   


 


 


Estimated GFR (Non-


American 


  23.7


  


  


   


 


 


BUN/Creatinine Ratio  33.1   10-20  


 


Random Glucose  110   70-99  mg/dl


 


Calcium Level  8.8   8.5-10.1  mg/dl


 


Phosphorus Level  4.5   2.5-4.9  mg/dl


 


Magnesium Level  3.0   1.8-2.4  mg/dl


 


Activated Partial


Thromboplast Time 


  


  45.5


  


  21.0-31.0


SECONDS


 


Partial Thromboplastin Ratio   1.8   


 


Test


  3/24/18


01:45 3/24/18


05:45 


  


  Range/Units


 


 


Activated Partial


Thromboplast Time 52.0


  48.7


  


  


  21.0-31.0


SECONDS


 


Partial Thromboplastin Ratio 2.0 1.9    


 


White Blood Count  19.59   4.8-10.8  K/uL


 


Red Blood Count  4.40   4.2-5.4  M/uL


 


Hemoglobin  11.9   12.0-16.0  g/dL


 


Hematocrit  35.0   37-47  %


 


Mean Corpuscular Volume  79.5     fL


 


Mean Corpuscular Hemoglobin  27.0   25-34  pg


 


Mean Corpuscular Hemoglobin


Concent 


  34.0


  


  


  32-36  g/dl


 


 


Platelet Count  252   130-400  K/uL


 


Mean Platelet Volume  11.3   7.4-10.4  fL


 


Neutrophils (%) (Auto)  74.3    %


 


Lymphocytes (%) (Auto)  10.9    %


 


Monocytes (%) (Auto)  13.1    %


 


Eosinophils (%) (Auto)  0.1    %


 


Basophils (%) (Auto)  0.1    %


 


Neutrophils # (Auto)  14.55   1.4-6.5  K/uL


 


Lymphocytes # (Auto)  2.14   1.2-3.4  K/uL


 


Monocytes # (Auto)  2.56   0.11-0.59  K/uL


 


Eosinophils # (Auto)  0.02   0-0.5  K/uL


 


Basophils # (Auto)  0.02   0-0.2  K/uL


 


RDW Standard Deviation  38.2   36.4-46.3  fL


 


RDW Coefficient of Variation  13.2   11.5-14.5  %


 


Immature Granulocyte % (Auto)  1.5    %


 


Immature Granulocyte # (Auto)  0.30   0.00-0.02  K/uL


 


Sodium Level  142   136-145  mmol/L


 


Potassium Level  3.3   3.5-5.1  mmol/L


 


Chloride Level  108     mmol/L


 


Carbon Dioxide Level  25   21-32  mmol/L


 


Anion Gap  9.0   3-11  mmol/L


 


Blood Urea Nitrogen  46   7-18  mg/dl


 


Creatinine


  


  1.13


  


  


  0.60-1.20


mg/dl


 


Est Creatinine Clear Calc


Drug Dose 


  45.7


  


  


   ml/min


 


 


Estimated GFR (


American) 


  61.2


  


  


   


 


 


Estimated GFR (Non-


American 


  52.8


  


  


   


 


 


BUN/Creatinine Ratio  41.1   10-20  


 


Random Glucose  139   70-99  mg/dl


 


Calcium Level  8.7   8.5-10.1  mg/dl


 


Phosphorus Level  2.8   2.5-4.9  mg/dl


 


Magnesium Level  2.2   1.8-2.4  mg/dl


 


Procalcitonin  30.26   0-0.5  ng/ml
Internal Med Progress Note


Date of Service:


Mar 25, 2018.


Provider Documentation:





SUBJECTIVE:





The patient was seen and examined


She is recently moved from [place] without any significant past medical 

history.


She has been complaining of bilateral leg swelling for the last 5 or 6 months.


She has been complaining of more shortness of breath for the last 2 days 

associated with fever and chills.


She also has frequency of urination but no dysuria


She does not feel any better since admission





3/21


Went into AF with RVR last night and was transferred to ICU 


AA this AM but drowsy and less communicative during my exam 


Reverted to SR]





3/22


Confused


Trying to communicate 


Very  weak and lethargic


 


3/23


Worse this am but much better this afternoon


Talking almost normally 


Discussed with the family members





3/24


Clinically a little better


Trying to communicate 


3/25


Much better today 


Rate is controlled with increasing dose of BB





OBJECTIVE:





Vital Signs-as noted below





Exam:


General-AA,pleasantly confused


Drowsiness improves


Remains shaky and weak


Eyes-normal 


ENT-normal


Neck-supple


Lungs-decreased breath sounds both sides without any crackles.


Heart-S1 and S2 regular, no murmur appreciated


Abdomen-Moderately distended, moderately  tender, bowel sounds sluggish


Extremities-bilateral leg edema-1-2+


Edema is more pronounced on the left side along the thighs.


Neuro-alert awake,pleasantly confused 


Generally weak 





Lab data as noted below.


ASSESSMENT & PLAN:





Metabolic Encephalopathy


Multifactorial -sepsis,Hypotensive episode,Hypoxemia and Renal impairment 


Remained pleasantly confused-a little better today  


MRI of the Head-unremarkable 


Ammonia -normal


Appreciate Neurology input


Clinically a little better today 


Remains pleasantly confused-a little better 








C Diff Colitis


Seems to be Severe attack 


Likely the cause of Deterioration 


Started on Oral Vancomycin and already on IV Flagyl 


May need Repeat CT of the Abd/Pel to r/o toxic megacolon/perforation-reasonably 

OK 


Appreciate Surgery input-no indication for surgery 


Abdominal fullness and tenderness are better


Will need 28 days of PO Vancomycin in total





AF with RVR -recurrent 


Likely secondary to Sepsis 


Contributed by Severely dilated Rt Atrium 


Started on IV Cardizem and IV Heparin 


Reverted to SR 


Increased Troponin likely due to CHF and Rate related 


Doubt any ACS


ECHO as reported 


Sinus pauses on Cardizem drip-NO MORE CARDIZEM


May use BB 


may require Amiodarone 


Reasonably controlled with BB-dose increased to 50mg BID





Severe sepsis-Gm Negative Bacteremia


SIRS plus lactic acidosis


Possible sources :left lower extremity cellulitis,GI (colitis, SBP-ascites on CT

)or UTI


Doxycycline for cellulitis


Cefepime and Flagyl for possible intraabdominal infection if any  


Continue current antibiotics


Blood cultures-Gm Negative Bacilli-sensitivity pending


Urine culture-pending


Clinically worse 


Appreciate ID input-continue current medication


WCC is improving ,repeat Blood cultures -negative 


Will need 14 days of IV Ceftriaxone from Neg Blood culture date





SOB with Clifford Legs Swelling and Bowel edema 


Likely secondary to Right Sided Hearty Failure 


Will give small amount of fluid for ongoing sepsis and low urine output


Cardiology consulted 


ECHO-The right ventricle is moderate to severely dilated.


  *  The right ventricular systolic function is severely reduced.


  *  The right atrium is severely dilated.


  *  There is trace tricuspid regurgitation.


  *  Flattened septum is consistent with RV volume overload.


  *  Left ventricular systolic function is moderately reduced.


  *  Ejection Fraction = 35-40%.


  *  Moderate global hypokinesis of the left ventricle.


  *  The inferior vena cava is severely dilated.


  *  Trivial loculated posterior pericardial effusion.


  *  There are no echocardiographic indications of cardiac tamponade.





BERNABE


Deteriorated overnight


Multifactorial 


Intervascular dehydration, hypotension,Embolic disease,and contrast induced


Will give more IV fluid 


Monitor PRP-worsening Kidney function


A Little better-not for Dialysis yet 


Renal function is Normalized 





HTN


On BB,Hydralazine and need small dose of Nitro





Possible  Cirrhosis.


Possibly from RSHF, passive congestion (unknown duration)


Appreciate GI evaluation


GI does not think the patient has Cirrhosis


US abdomen -minimal ascites 








Abnormal Thyroid Function


Likely secondary to Acute illness .Reactive 


Hyperthyroidism (possible Graves disease) new diagnosis-doubt 


Started on Methimazole -stopped today  after discussion with the Intensivist


Will need OP Endocrine follow up 


Cortisone level unremarkable





Chronic anemia, unknown baseline.


Work up 





Thrombocytopenia secondary to sepsis, cirrhosis.


  





DVT prophylaxis, SCDs RE thrombocytopenia.  





Full code.  


Discussed with the Daughter on 3/20/18 and 3/21 and 3/23


Vital Signs:











  Date Time  Temp Pulse Resp B/P (MAP) Pulse Ox O2 Delivery O2 Flow Rate FiO2


 


3/25/18 10:01  73 19 154/88 (110) 97   


 


3/25/18 09:31  78 20 158/93 (114) 96   


 


3/25/18 09:01  80 19 157/99 (118) 96   


 


3/25/18 08:01 37.2 89 25 170/94 (119) 98 Nasal Cannula 2.0 


 


3/25/18 08:00      Nasal Cannula 2.0 


 


3/25/18 07:45  82 24 158/93 (114) 97   


 


3/25/18 07:01  135 28 143/102 (116) 97   


 


3/25/18 06:47  120  145/111    


 


3/25/18 06:00  120 23 145/111 (122) 97   


 


3/25/18 04:07      Nasal Cannula 3.0 


 


3/25/18 04:00  86 22 177/94 (121) 97   


 


3/25/18 02:00  89 27 176/90 (118) 96   


 


3/25/18 00:32      Nasal Cannula 3.0 


 


3/25/18 00:29  84  167/94    


 


3/25/18 00:01  86 26 167/94 (118) 94 Nasal Cannula 3.0 


 


3/24/18 22:00  84 23 153/78 (103) 96 Nasal Cannula 4.0 


 


3/24/18 20:58      Nasal Cannula 3.0 


 


3/24/18 19:31 36.4       


 


3/24/18 18:01 36.8 95 23 169/76 (107) 95   


 


3/24/18 17:31  97 28 163/75 (104) 95   


 


3/24/18 17:01  93 29 162/74 (103) 96   


 


3/24/18 16:44  140  180/97    


 


3/24/18 16:31  85 31 180/97 (124) 98   


 


3/24/18 16:31  85 31 180/97 (124) 98   


 


3/24/18 16:30  83 25 183/97 (125) 97   


 


3/24/18 16:30  83 25 183/97 (125) 97   


 


3/24/18 16:01  83 22 183/108 (133) 95   


 


3/24/18 16:00     96 Nasal Cannula 3.0 


 


3/24/18 15:01  84 22 163/87 (112) 96   


 


3/24/18 14:31  85 18 156/80 (105) 96   


 


3/24/18 14:01 36.9 88 29 155/79 (104) 95  3.0 


 


3/24/18 14:01  88 29 155/79 (104) 95   


 


3/24/18 13:31  85 18 162/82 (108) 96  3.0 


 


3/24/18 13:01  82 16 157/82 (107) 96  3.0 


 


3/24/18 12:31  83 20 174/99 (124) 97  3.0 


 


3/24/18 12:14  81  163/84    


 


3/24/18 12:01 36.7 80 21 163/84 (110) 96   


 


3/24/18 12:00     96 Nasal Cannula 3.0 


 


3/24/18 11:31  79 16 158/74 (102) 95   








Lab Results:





Results Past 24 Hours








Test


  3/24/18


21:22 3/25/18


05:46 Range/Units


 


 


Sodium Level 144 144 136-145  mmol/L


 


Potassium Level 3.4 3.1 3.5-5.1  mmol/L


 


Chloride Level 109 108   mmol/L


 


Carbon Dioxide Level 29 27 21-32  mmol/L


 


Anion Gap 6.0 9.0 3-11  mmol/L


 


Blood Urea Nitrogen 34 28 7-18  mg/dl


 


Creatinine


  0.86


  0.77


  0.60-1.20


mg/dl


 


Est Creatinine Clear Calc


Drug Dose 58.4


  64.8


   ml/min


 


 


Estimated GFR (


American) 85.1


  97.3


   


 


 


Estimated GFR (Non-


American 73.4


  83.9


   


 


 


BUN/Creatinine Ratio 39.4 36.9 10-20  


 


Random Glucose 151 156 70-99  mg/dl


 


Calcium Level 8.1 8.5 8.5-10.1  mg/dl


 


Phosphorus Level 2.7 2.6 2.5-4.9  mg/dl


 


Magnesium Level 1.8 2.1 1.8-2.4  mg/dl


 


White Blood Count  17.67 4.8-10.8  K/uL


 


Red Blood Count  4.12 4.2-5.4  M/uL


 


Hemoglobin  11.5 12.0-16.0  g/dL


 


Hematocrit  33.5 37-47  %


 


Mean Corpuscular Volume  81.3   fL


 


Mean Corpuscular Hemoglobin  27.9 25-34  pg


 


Mean Corpuscular Hemoglobin


Concent 


  34.3


  32-36  g/dl


 


 


RDW Standard Deviation  40.5 36.4-46.3  fL


 


RDW Coefficient of Variation  13.7 11.5-14.5  %


 


Platelet Count  296 130-400  K/uL


 


Mean Platelet Volume  11.3 7.4-10.4  fL


 


Nucleated RBC Absolute Count


(auto) 


  0.06


  0-0  K/uL


 


 


Nucleated Red Blood Cells %  0.3  %


 


Activated Partial


Thromboplast Time 


  50.7


  21.0-31.0


SECONDS


 


Partial Thromboplastin Ratio  2.0  


 


Procalcitonin  11.75 0-0.5  ng/ml
Internal Med Progress Note


Date of Service:


Mar 26, 2018.


Provider Documentation:





SUBJECTIVE:





The patient was seen and examined


She is recently moved from [place] without any significant past medical 

history.


She has been complaining of bilateral leg swelling for the last 5 or 6 months.


She has been complaining of more shortness of breath for the last 2 days 

associated with fever and chills.


She also has frequency of urination but no dysuria


She does not feel any better since admission





3/21


Went into AF with RVR last night and was transferred to ICU 


AA this AM but drowsy and less communicative during my exam 


Reverted to SR]





3/22


Confused


Trying to communicate 


Very  weak and lethargic


 


3/23


Worse this am but much better this afternoon


Talking almost normally 


Discussed with the family members





3/24


Clinically a little better


Trying to communicate 


3/25


Much better today 


Rate is controlled with increasing dose of BB





3/26


Not so well this AM,again seen in presence of the Daughter


has had  CP add BP was very high 





OBJECTIVE:





Vital Signs-as noted below





Exam:


General-AA,pleasantly confused


Moderate SOB at rest 


Eyes-normal 


ENT-normal


Neck-supple


Lungs-decreased breath sounds bilaterally 


Heart-S1 and S2 regular, no murmur appreciated


Abdomen-Mildly  distended, mildly  tender, bowel sounds sluggish


Extremities-bilateral leg edema-1-2+


Edema is more pronounced on the left side along the thighs.


Neuro-alert awake,pleasantly confused 


Generally weak but no focal neuro deficit





Lab data as noted below.


ASSESSMENT & PLAN:





Metabolic Encephalopathy


Multifactorial -sepsis,Hypotensive episode,Hypoxemia and Renal impairment 


Remained pleasantly confused-a little better today  


MRI of the Head-unremarkable 


Ammonia -normal


Appreciate Neurology input


Remains pleasantly confused


Very slow improvement 











C Diff Colitis


Seems to be Severe attack 


Likely the cause of Deterioration 


Started on Oral Vancomycin and already on IV Flagyl 


May need Repeat CT of the Abd/Pel to r/o toxic megacolon/perforation-reasonably 

OK 


Appreciate Surgery input-no indication for surgery 


Abdominal fullness and tenderness are better


Will need 28 days of PO Vancomycin in total


Diarrhea is controlled 





AF with RVR -recurrent 


Likely secondary to Sepsis 


Contributed by Severely dilated Rt Atrium 


Started on IV Cardizem and IV Heparin 


Reverted to SR 


Increased Troponin likely due to CHF and Rate related 


Doubt any ACS


ECHO as reported 


Sinus pauses on Cardizem drip-NO MORE CARDIZEM


May use BB 


may require Amiodarone 


Reasonably controlled with BB-dose increased to 50mg QID


Started on Eliquis 





Severe sepsis-Gm Negative Bacteremia


SIRS plus lactic acidosis


Possible sources :left lower extremity cellulitis,GI (colitis, SBP-ascites on CT

)or UTI


Doxycycline for cellulitis


Cefepime and Flagyl for possible intraabdominal infection if any  


Continue current antibiotics


Blood cultures-Gm Negative Bacilli-sensitivity pending


Urine culture-pending


Clinically worse 


Appreciate ID input-continue current medication


WCC is improving ,repeat Blood cultures -negative 


Will need 14 days of IV Ceftriaxone from Neg Blood culture date


WCC -improving





SOB with Clifford Legs Swelling and Bowel edema 


Likely secondary to Right Sided Hearty Failure 


Will give small amount of fluid for ongoing sepsis and low urine output


Cardiology consulted 


ECHO-The right ventricle is moderate to severely dilated.


  *  The right ventricular systolic function is severely reduced.


  *  The right atrium is severely dilated.


  *  There is trace tricuspid regurgitation.


  *  Flattened septum is consistent with RV volume overload.


  *  Left ventricular systolic function is moderately reduced.


  *  Ejection Fraction = 35-40%.


  *  Moderate global hypokinesis of the left ventricle.


  *  The inferior vena cava is severely dilated.


  *  Trivial loculated posterior pericardial effusion.


  *  There are no echocardiographic indications of cardiac tamponade.


Repeat ECHO::The left ventricle is normal in size.


  *  There is mild concentric left ventricular hypertrophy.


  *  There is mild global hypokinesis of the left ventricle.


  *  Ejection Fraction = 40-45%.


  *  The left atrium is mildly dilated.


  *  The right atrium is severely dilated.


  *  The right ventricular systolic function is mildly reduced.


  *  There is trace mitral regurgitation.


  *  There is moderate tricuspid regurgitation.


  *  Right ventricular systolic pressure is elevated at 50-60mmHg.


  *  Moderate size left pleural effusion.


  *  In comparison to prior, overal LV and RV systolic function has improved


ECHO-shows improvement 








BERNABE


Deteriorated overnight


Multifactorial 


Intervascular dehydration, hypotension,Embolic disease,and contrast induced


Will give more IV fluid 


Monitor PRP-worsening Kidney function


A Little better-not for Dialysis yet 


Renal function is Normalized 





HTN


On BB,Hydralazine and need small dose of Nitro


Fluctuating Blood Pressure





Possible  Cirrhosis.


Possibly from RSHF, passive congestion (unknown duration)


Appreciate GI evaluation


GI does not think the patient has Cirrhosis


US abdomen -minimal ascites 








Abnormal Thyroid Function


Likely secondary to Acute illness .Reactive 


Hyperthyroidism (possible Graves disease) new diagnosis-doubt 


Started on Methimazole -stopped today  after discussion with the Intensivist


Will need OP Endocrine follow up 


Cortisone level unremarkable





Chronic anemia, unknown baseline.


Work up 





Thrombocytopenia secondary to sepsis, cirrhosis.


  





DVT prophylaxis, SCDs RE thrombocytopenia.  





Full code.  


Discussed with the Daughter on 3/20/18 and 3/21 and 3/23 and again 3/26/18


Vital Signs:











  Date Time  Temp Pulse Resp B/P (MAP) Pulse Ox O2 Delivery O2 Flow Rate FiO2


 


3/26/18 11:38    140/83 (102)    


 


3/26/18 08:00      Nasal Cannula 2.0 


 


3/26/18 07:00 36.4 84 28 174/114 (134) 95 Nasal Cannula 2.0 


 


3/26/18 05:51  87  171/96    


 


3/26/18 04:53    167/99 (121)    


 


3/26/18 04:05      Nasal Cannula 2.0 


 


3/26/18 02:50 36.7 83 26 205/127 (153) 98   





    201/145 (163)    


 


3/26/18 00:06      Nasal Cannula 2.0 


 


3/25/18 23:58 36.7 69 26 158/93 (114) 91   


 


3/25/18 20:24      Nasal Cannula 2.0 


 


3/25/18 19:04 36.8 74 22 169/95 (119) 96 Nasal Cannula 2.5 


 


3/25/18 16:02 36.7 72 26 126/79 (95) 97 Nasal Cannula 2.0 


 


3/25/18 16:00      Nasal Cannula 2.0 


 


3/25/18 15:01  70 25 150/79 (102) 95 Nasal Cannula 2.0 


 


3/25/18 14:01  68 23 136/81 (99) 96 Nasal Cannula 2.0 


 


3/25/18 13:01  67 15 136/78 (97) 96   


 


3/25/18 12:31  72 19 142/83 (102) 95   








Lab Results:





Results Past 24 Hours








Test


  3/26/18


05:19 3/26/18


05:23 Range/Units


 


 


Activated Partial


Thromboplast Time 35.4


  


  21.0-31.0


SECONDS


 


Partial Thromboplastin Ratio 1.4   


 


White Blood Count  17.75 4.8-10.8  K/uL


 


Red Blood Count  4.24 4.2-5.4  M/uL


 


Hemoglobin  11.8 12.0-16.0  g/dL


 


Hematocrit  35.3 37-47  %


 


Mean Corpuscular Volume  83.3   fL


 


Mean Corpuscular Hemoglobin  27.8 25-34  pg


 


Mean Corpuscular Hemoglobin


Concent 


  33.4


  32-36  g/dl


 


 


RDW Standard Deviation  41.9 36.4-46.3  fL


 


RDW Coefficient of Variation  13.9 11.5-14.5  %


 


Platelet Count  364 130-400  K/uL


 


Mean Platelet Volume  11.2 7.4-10.4  fL


 


Nucleated RBC Absolute Count


(auto) 


  0.07


  0-0  K/uL


 


 


Nucleated Red Blood Cells %  0.4  %


 


Sodium Level  142 136-145  mmol/L


 


Potassium Level  3.6 3.5-5.1  mmol/L


 


Chloride Level  109   mmol/L


 


Carbon Dioxide Level  27 21-32  mmol/L


 


Anion Gap  7.0 3-11  mmol/L


 


Blood Urea Nitrogen  32 7-18  mg/dl


 


Creatinine


  


  0.81


  0.60-1.20


mg/dl


 


Est Creatinine Clear Calc


Drug Dose 


  61.0


   ml/min


 


 


Estimated GFR (


American) 


  91.5


   


 


 


Estimated GFR (Non-


American 


  78.9


   


 


 


BUN/Creatinine Ratio  39.8 10-20  


 


Random Glucose  242 70-99  mg/dl


 


Calcium Level  8.4 8.5-10.1  mg/dl
Internal Med Progress Note


Date of Service:


Mar 27, 2018.


Provider Documentation:





Made aware by RN of CT head official read.





Subacute infarct, R cerebellum





SBP 190s as per RN, episodic agitation








AP





Subacute CVA


hx AF (Eliquis currently on hold secondary to n.p.o. status/swallowing concerns 

as per RN)





HTN urgency 2 to above








Low dose IV heparin for stroke prophylaxis while Eliquis on hold as per 

Neurology recommendations.


Permissive hypertension for now, IV Lopressor round-the-clock while p.o. meds 

cannot be given.





Will relay to AM provider.


Vital Signs:











  Date Time  Temp Pulse Resp B/P (MAP) Pulse Ox O2 Delivery O2 Flow Rate FiO2


 


3/28/18 07:34 36.9 78 18 154/79 (104) 95 Nasal Cannula  


 


3/28/18 05:29  81  172/94    


 


3/28/18 04:15  77  159/85 (109)    


 


3/28/18 04:00      Nasal Cannula 2.0 


 


3/28/18 03:00 36.9 75 20 179/112 (134) 99   


 


3/28/18 02:00  75 19 162/87 (112) 97 Nasal Cannula 2.0 


 


3/28/18 01:09  70  188/99 (128)    


 


3/28/18 00:25   29 212/111 (144) 94 Nasal Cannula 2.0 


 


3/28/18 00:13  75 22  100 Nasal Cannula 2.0 


 


3/27/18 23:59 36.9 74 32 194/117 (142) 98 Nasal Cannula 2.0 


 


3/27/18 23:59      Nasal Cannula 2.0 


 


3/27/18 23:45  86  216/122    


 


3/27/18 21:38  78  191/107 (135)    





    190/114 (139)    


 


3/27/18 20:00      Nasal Cannula 2.0 


 


3/27/18 19:40 36.4 68 22 175/95 (121) 95 Room Air  


 


3/27/18 16:18 36.4 63 22 172/93 (119) 100 Nasal Cannula 2.0 


 


3/27/18 16:00      Nasal Cannula 2.0 


 


3/27/18 12:00      Nasal Cannula 2.0 


 


3/27/18 12:00 36.6 62 22 146/87 (106) 98 Nasal Cannula 2.0 


 


3/27/18 08:47  60 22 146/84 (104) 95 Nasal Cannula 25.0 








Lab Results:





Results Past 24 Hours








Test


  3/27/18


10:03 3/27/18


13:27 3/27/18


16:20 3/27/18


19:46 Range/Units


 


 


Bedside Glucose 296 236 96 52 70-90  mg/dl


 


Test


  3/27/18


20:23 3/27/18


23:02 3/27/18


23:42 3/28/18


02:25 Range/Units


 


 


Bedside Glucose 146  83 91 70-90  mg/dl


 


White Blood Count  20.41   4.8-10.8  K/uL


 


Red Blood Count  4.41   4.2-5.4  M/uL


 


Hemoglobin  12.3   12.0-16.0  g/dL


 


Hematocrit  37.0   37-47  %


 


Mean Corpuscular Volume  83.9     fL


 


Mean Corpuscular Hemoglobin  27.9   25-34  pg


 


Mean Corpuscular Hemoglobin


Concent 


  33.2


  


  


  32-36  g/dl


 


 


Platelet Count  533   130-400  K/uL


 


Mean Platelet Volume  10.3   7.4-10.4  fL


 


Neutrophils (%) (Auto)  71.9    %


 


Lymphocytes (%) (Auto)  14.8    %


 


Monocytes (%) (Auto)  8.2    %


 


Eosinophils (%) (Auto)  1.2    %


 


Basophils (%) (Auto)  0.1    %


 


Neutrophils # (Auto)  14.66   1.4-6.5  K/uL


 


Lymphocytes # (Auto)  3.03   1.2-3.4  K/uL


 


Monocytes # (Auto)  1.67   0.11-0.59  K/uL


 


Eosinophils # (Auto)  0.25   0-0.5  K/uL


 


Basophils # (Auto)  0.03   0-0.2  K/uL


 


RDW Standard Deviation  43.4   36.4-46.3  fL


 


RDW Coefficient of Variation  14.3   11.5-14.5  %


 


Immature Granulocyte % (Auto)  3.8    %


 


Immature Granulocyte # (Auto)  0.77   0.00-0.02  K/uL


 


Prothrombin Time


  


  14.7


  


  


  9.0-12.0


SECONDS


 


Prothromb Time International


Ratio 


  1.4


  


  


  0.9-1.1  


 


 


Activated Partial


Thromboplast Time 


  36.4


  


  


  21.0-31.0


SECONDS


 


Partial Thromboplastin Ratio  1.4    


 


Sodium Level  143   136-145  mmol/L


 


Potassium Level  4.5   3.5-5.1  mmol/L


 


Chloride Level  111     mmol/L


 


Carbon Dioxide Level  26   21-32  mmol/L


 


Anion Gap  6.0   3-11  mmol/L


 


Blood Urea Nitrogen  34   7-18  mg/dl


 


Creatinine


  


  0.78


  


  


  0.60-1.20


mg/dl


 


Est Creatinine Clear Calc


Drug Dose 


  66.2


  


  


   ml/min


 


 


Estimated GFR (


American) 


  95.8


  


  


   


 


 


Estimated GFR (Non-


American 


  82.6


  


  


   


 


 


BUN/Creatinine Ratio  43.6   10-20  


 


Random Glucose  104   70-99  mg/dl


 


Estimated Average Glucose  140    mg/dl


 


Hemoglobin A1c  6.5   4.5-5.6  %


 


Calcium Level  9.0   8.5-10.1  mg/dl


 


Magnesium Level  1.9   1.8-2.4  mg/dl


 


Test


  3/28/18


03:59 3/28/18


06:26 


  


  Range/Units


 


 


Bedside Glucose 90    70-90  mg/dl


 


White Blood Count  23.96   4.8-10.8  K/uL


 


Red Blood Count  4.38   4.2-5.4  M/uL


 


Hemoglobin  12.2   12.0-16.0  g/dL


 


Hematocrit  36.7   37-47  %


 


Mean Corpuscular Volume  83.8     fL


 


Mean Corpuscular Hemoglobin  27.9   25-34  pg


 


Mean Corpuscular Hemoglobin


Concent 


  33.2


  


  


  32-36  g/dl


 


 


RDW Standard Deviation  42.5   36.4-46.3  fL


 


RDW Coefficient of Variation  14.3   11.5-14.5  %


 


Platelet Count  565   130-400  K/uL


 


Mean Platelet Volume  10.6   7.4-10.4  fL


 


Activated Partial


Thromboplast Time 


  68.9


  


  


  21.0-31.0


SECONDS


 


Partial Thromboplastin Ratio  2.7
Post ICU Progress Note


Date & Time


Mar 26, 2018 at 01:30





Vital Signs





Vital Signs Past 12 Hours








  Date Time  Temp Pulse Resp B/P (MAP) Pulse Ox O2 Delivery O2 Flow Rate FiO2


 


3/26/18 00:06      Nasal Cannula 2.0 


 


3/25/18 23:58 36.7 69 26 158/93 (114) 91   


 


3/25/18 20:24      Nasal Cannula 2.0 


 


3/25/18 19:04 36.8 74 22 169/95 (119) 96 Nasal Cannula 2.5 


 


3/25/18 16:02 36.7 72 26 126/79 (95) 97 Nasal Cannula 2.0 


 


3/25/18 16:00      Nasal Cannula 2.0 


 


3/25/18 15:01  70 25 150/79 (102) 95 Nasal Cannula 2.0 


 


3/25/18 14:01  68 23 136/81 (99) 96 Nasal Cannula 2.0 











Notes


Mental Status:  see Notes


Nausea / Vomiting:  adequately controlled


Pain:  adequately controlled


Airway Patency, RR, SpO2:  see Notes


BP & HR:  stable & adequate


Patient is a 62-year-old female who initially was admitted to the ICU in A. fib 

with rapid RVR in the setting of sepsis with decompensated RIGHT-sided heart 

failure with anasarca.  Eventually, she converted after IV Cardizem drip.  

Since that time, her symptomatic paroxysmal A. fib has been controlled with as 

needed doses of metoprolol.  The patient has continued with aggressive 

antibiotic therapies as well as oral antibiotics for a C. difficile infection.  

Repeat blood cultures were unremarkable from initial cultures that were both 

positive for E. coli bacteremia.  Her white count continues to steadily improve 

as does pro-Krish.  Initially, patient with acute renal failure with a creatinine 

greater than 3.  She eventually began to diuresis and has since improved her 

volume status.  Additionally, her kidney function has rebounded to a near 

normal state.  Mentally, the patient continues to improve, however very slowly.





On evaluation, the patient is resting comfortably.  She is asleep but easily 

wakes.  She is unable to offer any complaints at this point.





Consider outpatient follow up in 1 to 2 weeks with:  PCP, Cardiology





Repeat imaging needed:  Would consider repeat images of Abd/Pelvis if abd pain 

returns in the setting of C.diff infection.





Follow up cultures:  None at this point as her repeat cultures were found to be 

unremarkable.





Reviewed progress notes, labs, and inpatient medication list





Continue current management





Additional recommendations:  Continue to monitor the patient's K+ closely as 

she was aggressively diuresed and had previously been on a HCO3 gtt.





Thank you for allowing us to participate in the care of this patient. At this 

time, Critical Care Services will sign off on this case. Please feel free to 

reconsult as needed.
Progress Note


Date of Service


Mar 20, 2018.





Progress Note


Around 10:50 PM, medical doctor was called that patient has been having atrial 

fibrillation with RVR. EKG obtained and on physical exam and telemetry 

monitoring, patient's heart rate above 140 beats per minute. Patient was seen 

and examined. She was not in obvious distress. Patient did not appear to feel 

palpitations as she was surprised when she was told that her heart rate is 

fast. Patient asymptomatic. Lung exam was clear. Patient has lower extremity 

edema which was documented in previous hospital course notes. Patient had IV 

fluids and antibiotics running. Labs were ordered for the patient. Lopressor 5 

mg IV x 1 was ordered after confirming that patient's blood pressure is stable. 

Will continue to monitor heart rate for further beta blocker vs calcium channel 

blocker for rate control of atrial fibrillation with RVR as the blood pressure 

tolerates
Progress Note


Date of Service


Mar 22, 2018.





Progress Note


ID Consult Dictated #[id number]





A/P:





1. E. coli Septicemia


2. C. diff colitis


3. Leukocytosis





-Continue ctx, cont po vanco


-Follow wbc


-Repeat blood cultures


-If worsening clinical status, increased wbc, increased abd distention would 

repeat ct a/p, consider surgical eval


-Will follow, thank you
Progress Note


Date of Service


Mar 27, 2018.





Progress Note


This is a 62 year old female with no past medical history prior to arrival.





Clinical course obtained from chart review:





March 19, 2018


Presented on 3/19 with significant bilateral lower extremity edema, fevers, 

chills, abdominal distention, nausea.





March 20, 2018


Patient admitted on 3/20 due to meeting severe sepsis criteria. She had 

significant lactic acidosis, tachycardia; later developed hypotension, 

leukocytosis, and elevated procalcitonin.


She was started on broad spectrum antibiotics, initially to cover cellulitis.





Imaging suggested shock bowel, hypotensive bowel - initial concern of liver 

cirrhosis. Further testing suggested significant R heart failure with hepatic 

congestion. Edematous lower extremities likely from the biventricular heart 

failure noted. Echo was done to confirm this diagnosis. LVEF at the time noted 

to be 35-40%. Dilatation of R atrium and R ventricle noted as well. Late on 3/20

, patient developed A. Fib with RVR and transferred to the ICU.





March 21, 2018


Patient spontaneously converted to sinus rhythm after being given IV Lopressor 

x2 and Cardizem ggt, which had to be stopped due to low blood pressure.


Troponin elevation noted, likely type 2 demand ischemia from infection, 

arrhythmia, sepsis.


Patient was started on IV heparin.





Patient was found to be positive for C. Diff Infection; CT suggested colitis. 

NGT was placed in the ICU and Vanco solution was given to treat this.


Blood cultures were positive at this time for gram negative organism.





Patient's mental status acutely worsened. Neurology was consulted and an MRI 

and EEG were performed. Attributed mental status change to metabolic 

encephalopathy.





Kidney function also acutely worsened; possibly from IV contrast from imaging 

done prior.





March 22, 2018


Nephrology consulted due to acute kidney injury and acute tubular necrosis. 

Consideration made for dialysis; though this was never required.


ID consulted - E. coli septicemia noted on blood cultures x2, repeat cultures 

were ordered.


Nephrology consulted - metabolic acidosis - possibly from sepsis and BERNABE.





March 23, 2018


General surgery consulted regarding C. Diff colitis, conservative management 

decided.


Patient at this time was going in and out of A. fib; initially started on IV 

Cardizem, but due to post-conversion pauses, this was stopped.


Was started on bicarb drip due to acidosis.


ID following for UTI, C. Diff colitis, E. coli septicemia.





March 24, 2018


Clinical improvement made on March 24 mentally.


Hypertension was still an issue and cardiology worked on getting this down by 

increasing b-blocker dose and adding hydralazine.





March 25, 2018


continuing PO Vancomycin and IV Rocephin


IV heparin stopped at this time. Eliquis had been started.


clinical picture continued to improve as per records.





March 26, 2018


Repeat TTE was done; improving biventricular heart failure noted.


Hypertensive Urgency still an issue on this day; cardiology adjusted meds to 

add nitropaste and Hydralazine, as well as an increase in Lopressor dose.
Subjective


Date of Service:


Apr 1, 2018.


Subjective


Pt evaluation today including:  conversation w/ patient, conversation w/ family

, physical exam, lab review, review of studies, conversation w/ consultant, 

review of inpatient medication list


Saw/examined the patient in room 376


No problems/issues today, she is much more alert and awake today and does not c/

o pain





Problem List


Medical Problems:


(1) Anasarca


Status: Acute  





(2) Atrial fibrillation with RVR


Status: Acute  





(3) C. difficile diarrhea


Status: Acute  





(4) Elevated troponin


Status: Acute  





(5) Hyperthyroidism


Status: Acute  





(6) Hypotension


Status: Acute  





(7) Left leg cellulitis


Status: Acute  





(8) Left leg pain


Status: Acute  





(9) Right-sided heart failure


Status: Acute  





(10) Sepsis


Status: Acute  











Review of Systems


Constitutional:  + weakness, No fever, No chills


Respiratory:  No cough, No sputum, No wheezing, No shortness of breath, No 

dyspnea on exertion, No dyspnea at rest, No hemoptysis


Cardiac:  No chest pain, No edema, No palpitations


Abdomen:  No pain, No nausea, No vomiting, No diarrhea


Musculoskeletal:  + joint pain





Medications





Current Inpatient Medications








 Medications


  (Trade)  Dose


 Ordered  Sig/Wilman


 Route  Start Time


 Stop Time Status Last Admin


Dose Admin


 


 Acetaminophen


  (Tylenol Tab)  325 mg  Q6H  PRN


 PO  3/20/18 03:15


 4/19/18 03:14  4/1/18 08:10


325 MG


 


 Pantoprazole


 Sodium 40 mg/


 Syringe  10 ml @ 5


 mls/min  DAILY@11


 IV  3/21/18 11:00


 4/20/18 10:59  4/1/18 13:33


5 MLS/MIN


 


 Vancomycin HCl


  (Vancomycin Oral


 Soln)  500 mg  QID


 PO  3/22/18 17:00


 4/5/18 16:59  4/1/18 13:32


500 MG


 


 Hydromorphone HCl


  (Dilaudid Inj)  0.5 mg  Q3H  PRN


 IV  3/23/18 10:45


 4/6/18 10:44  3/27/18 07:56


0.5 MG


 


 Ceftriaxone


 Sodium 2 gm/


 Dextrose  50 ml @ 


 100 mls/hr  Q24H


 IV  3/23/18 18:00


 4/1/18 17:59  3/31/18 17:58


100 MLS/HR


 


 Ondansetron HCl 6


 mg/Dextrose  53 ml @ 


 200 mls/hr  Q6H  PRN


 IV  3/25/18 00:30


 4/24/18 00:29  3/25/18 07:48


200 MLS/HR


 


 Miscellaneous


 Information


  (Consult


 Glycemic


 Management


 Pharmacy)  1 ea  UD  PRN


 N/A  3/27/18 09:32


 4/26/18 09:31   


 


 


 Glucose


  (Glucose 40% Gel)  15-30


 GRAMS 15


 GRAMS...  UD  PRN


 PO  3/27/18 20:00


 4/26/18 19:59   


 


 


 Glucose


  (Glucose Chew


 Tab)  4-8


 Tablets 4


 Tabl...  UD  PRN


 PO  3/27/18 20:00


 4/26/18 19:59   


 


 


 Dextrose


  (Dextrose 50%


 50ML Syringe)  25-50ML OF


 50% DW IV


 FOR...  UD  PRN


 IV  3/27/18 20:00


 4/26/18 19:59   


 


 


 Glucagon


  (Glucagon Inj)  1 mg  UD  PRN


 SQ  3/27/18 20:00


 4/26/18 19:59   


 


 


 Heparin Sodium/


 Dextrose  500 ml @ 


 13 mls/hr  Q24H


 IV  3/27/18 23:30


 4/26/18 23:29  4/1/18 07:09


13 MLS/HR


 


 Ipratropium


 Bromide


  (Atrovent 0.02%


 0.5MG/2.5ML Neb)  0.5 mg  Q4H  PRN


 INH  3/28/18 00:15


 4/27/18 00:14   


 


 


 Levalbuterol


  (Xopenex 1.25MG/


 0.5ML Neb)  1.25 mg  Q4H  PRN


 INH  3/28/18 00:15


 4/27/18 00:14   


 


 


 Insulin Aspart


  (novoLOG ASPART)  SLIDING


 SCALE  ACHS


 SC  3/29/18 07:00


 4/28/18 06:59  3/31/18 21:15


2 UNITS


 


 Raspberry


  (Raspberry Syrup


 5ml Cup)  2.5 ml  QID


 PO  3/30/18 09:00


 4/13/18 08:59  4/1/18 13:32


2.5 ML


 


 Hydralazine HCl


  (Apresoline Tab)  50 mg  TID


 PO  3/31/18 14:00


 4/28/18 13:59  4/1/18 08:11


50 MG


 


 Isosorbide


 Mononitrate


  (Imdur Ext Rel


 Tab)  30 mg  QAM


 PO  4/1/18 09:00


 5/1/18 08:59  4/1/18 08:10


30 MG


 


 Metoprolol


 Tartrate


  (Lopressor Tab)  50 mg  BID


 PO  4/1/18 09:00


 5/1/18 08:59  4/1/18 08:11


50 MG


 


 Methimazole


  (Methimazole Tab)  5 mg  TID


 PO  4/1/18 14:00


 5/1/18 13:59   


 











Objective


Vital Signs











  Date Time  Temp Pulse Resp B/P (MAP) Pulse Ox O2 Delivery O2 Flow Rate FiO2


 


4/1/18 09:30 37.0       


 


4/1/18 07:30      Room Air  


 


4/1/18 07:24 37.2 71 16 178/86 (116) 94 Room Air  


 


4/1/18 00:53      Room Air  


 


3/31/18 22:52 37.3 69 16 155/81 (105) 94 Room Air  


 


3/31/18 21:17 37.1 71 16 155/73 (100) 94 Room Air  


 


3/31/18 19:30      Room Air  


 


3/31/18 16:48 36.7 64 20  96  2.0 


 


3/31/18 16:21 36.7 64 20 134/89 (104) 96 Room Air  


 


3/31/18 16:00 36.7 64 20  96  2.0 











Physical Exam


General Appearance:  no apparent distress


Eyes:  normal inspection


ENT:  hearing grossly normal


Neck:  supple


Respiratory/Chest:  no respiratory distress, no accessory muscle use, + 

decreased breath sounds


Cardiovascular:  regular rate, rhythm, no edema, no murmur


Abdomen:  normal bowel sounds, non tender, soft


Extremities:  normal inspection, no pedal edema, + pertinent finding (LLE pain)


Neurologic/Psychiatric:  no motor/sensory deficits, alert, normal mood/affect





Laboratory Results





Last 24 Hours








Test


  3/31/18


15:39 3/31/18


16:23 3/31/18


20:34 3/31/18


22:42


 


Activated Partial


Thromboplast Time 46.2 SECONDS 


  


  


  54.6 SECONDS 


 


 


Partial Thromboplastin Ratio 1.8    2.1 


 


Bedside Glucose  158 mg/dl  196 mg/dl  


 


Test


  4/1/18


05:54 4/1/18


09:23 4/1/18


12:26 


 


 


White Blood Count 16.83 K/uL    


 


Red Blood Count 4.15 M/uL    


 


Hemoglobin 11.6 g/dL    


 


Hematocrit 34.5 %    


 


Mean Corpuscular Volume 83.1 fL    


 


Mean Corpuscular Hemoglobin 28.0 pg    


 


Mean Corpuscular Hemoglobin


Concent 33.6 g/dl 


  


  


  


 


 


RDW Standard Deviation 43.4 fL    


 


RDW Coefficient of Variation 14.7 %    


 


Platelet Count 611 K/uL    


 


Mean Platelet Volume 10.7 fL    


 


Activated Partial


Thromboplast Time 49.0 SECONDS 


  


  


  


 


 


Partial Thromboplastin Ratio 1.9    


 


Sodium Level 141 mmol/L    


 


Potassium Level 3.9 mmol/L    


 


Chloride Level 107 mmol/L    


 


Carbon Dioxide Level 27 mmol/L    


 


Anion Gap 7.0 mmol/L    


 


Blood Urea Nitrogen 13 mg/dl    


 


Creatinine 0.69 mg/dl    


 


Est Creatinine Clear Calc


Drug Dose 69.1 ml/min 


  


  


  


 


 


Estimated GFR (


American) 109.7 


  


  


  


 


 


Estimated GFR (Non-


American 94.6 


  


  


  


 


 


BUN/Creatinine Ratio 18.4    


 


Random Glucose 64 mg/dl    


 


Calcium Level 8.4 mg/dl    


 


Magnesium Level 1.6 mg/dl    


 


Triglycerides Level 68 mg/dl    


 


Cholesterol Level 110 mg/dl    


 


HDL Cholesterol 37 mg/dl    


 


LDL Cholesterol, Calculated 59 mg/dl    


 


VLDL Cholesterol, Calculated 14 mg/dl    


 


Cholesterol/HDL Ratio 3.0    


 


Thyroid Stimulating Hormone


(TSH) < 0.005 uIu/ml 


  


  


  


 


 


Free Thyroxine 0.58 ng/dl    


 


Bedside Glucose  118 mg/dl  114 mg/dl  











Assessment and Plan


This is a 62 year old female with no past medical history prior to arrival.





Biventricular Heart Failure


Bilateral Pleural Effusions - improving


4/1


- thoracentesis canceled; will continue Lasix


- will likely need Lasix 20mg every other day as outpatient


- appreciate cardiology input


- as outpatient begin ACE-I





 3/31 - plan for thoracentesis - intermittent Lasix may be needed - as 

outpatient, should start ACE-I


3/30 - will consult pulm regarding bilateral pleural effusion and possible need 

for thoracentesis - appreciate cardiology input - intermittently receiving Lasix

, received some last evening


3/29 - persistent bilateral pleural effusions - pulmonary consulted for 

possible thoracentesis on one side


3/28 - required IV Lasix yesterday - monitor fluid status


3/27 - patient presented with worsening lower extremity edema, hepatic 

congestion - echo initially suggested significant R ventricular systolic 

function and decreased LVEF - started on b-blocker, treated for infection, etc. 

- repeat echo was performed on 3/26 with improvement of both ventricular 

function - further management as per cardiology 





Metabolic Encephalopathy - resolved


Bilateral Cerebellar Hemisphere Infarcts


4/1


- continue IV heparin for now


- transition to Eliquis prior to discharge


- PT/OT


- outpatient neurology input





 3/31 - neurology has signed off - currently heparin ggt on hold due to 

thoracentesis - will restart IV Heparin after tap when okay with cardiothoracic 

surgery - eventual plan to place her on Eliquis


3/30 - appreciate neurology input - will need continued PT/OT/speech 

evaluations - currently on IV heparin; will transition to oral anticoagulant 

prior to discharge


3/29 - Brain MRI suggests bilateral cerebellar infarcts - appreciate neurology 

evaluation - currently on IV heparin - will continue speech/PT/OT - clinically 

improving


3/28 - Head CT performed late on 3/27, suggesting possible subacute infarct - 

Brain MRI could not be performed; dental implant? - repeat Head CT in 1-2 days 

- appreciate neurology input - started on IV heparin at this time due to 

pulling of NGT and failing swallow evaluation - speech eval - PT/OT


3/27 - patient with confusion, disorientation - possibly related to Dilaudid use

, also has had sepsis, hypotension episodes, BERNABE earlier in the admission - 

appreciate neurology input on this matter, no need for further imaging - Brain 

MRI, Head CT already performed, EEG reviewed by neurology - slow improvement - 

may need Ativan for significant anxiety as well 





Severe Sepsis, E. coli Septicemia


Severe C. Diff Colitis - improving


4/1


- continue Rocephin for the E. coli septicemia (day #9/14)


- continue PO Vancomycin for C. diff - continue for a total of 28 days





 3/31 - white count continues to improve - continue Rocephin plus PO Vanco


3/30 - white count improving to 16k from 19k - will need Rocephin x14 days from 

last negative blood culture (day #8/14) - for the C. Diff patient is on oral 

Vanco and IV Flagyl; will d/c Flagyl in AM (3/31)


3/29 - decrease in WBC count today - continue Rocephin for 14 days after the 

last negative blood culture - continue oral Vanco for the C. Diff - labial fold 

swelling noted; possible Bartholin abscess? - consulted OB/gyn and added warm 

compresses to the region


3/28 - WBC increasing - will repeat abdomen/pelvis CT - general surgery for 

further input - continue IV Rocephin; unfortunately, pulled NGT, so can't do 

oral Vanco, currently on IV Flagyl


3/27 - blood cultures positive for E. coli; repeat blood cultures - no growth - 

C. Diff positive, colitis noted on imaging - started on IV Flagyl initially, 

converted to PO Vanco - appreciate ID input - will need PO Vanco for 28 days 

total - will need Rocephin for 14 days since the negative blood culture on 3/22 

- appreciate general surgery input regarding C. Diff - no surgical intervention 

at this time - will monitor abdominal firmness/distention - if no improvement, 

repeat abd/pelvis CT can be done 





Paroxysmal A. Fib with RVR - resolved


4/1


- continue IV heparin, transition to Eliquis


- continue Lopressor





 3/31 - continue b-blocker, currently NSR


3/30 - currently in NSR, rate controlled with b-blocker - IV heparin ggt at 

this time


3/29 - currently on PO Metoprolol as per cardiology, appreciate input - IV 

heparin at this time


3/28 - Eliquis stopped - awaiting speech evaluation - IV heparin started - will 

need either IV Lopressor or re-insertion of NGT


3/27 - intermittent A. fib and rapid ventricular response; likely from sepsis - 

currently in NSR - appreciate cardiology input - continue Lopressor for rate 

control - Eliquis for anticoagulation - had required IV Lopressor and Cardizem 

ggt - no longer should receive Cardizem due to pauses noted earlier in the 

admission 





Type 2 Demand Ischemia


4/1


- continue Imdur, b-blocker, likely would benefit from statin addition prior to 

discharge





- troponin elevation when she first came in - likely secondary to infection, 

anemia, etc.


- initially was on IV heparin, now back on it


- now off of Eliquis


- continue b-blocker


- treating underlying infection





Hypertensive Urgency - improving


4/1


- blood pressure continues to be elevated


- likely from hyperthyroid


- starting methimazole


- continue Hydralazine, Imdur, Metoprolol





3/30


- appreciate cardio input regarding blood pressure


- on nitro paste and Hydralazine as well as b-blocker


- will increase hydralazine dose at this time





3/29


- appreciate cardiology input


- currently attempting nitro paste and hydralazine for blood pressure control


- holding off on ACE-I in the short term as per nephrology





3/28


- BP yesterday >200/100


- appreciate cardiology input


- started on Lopressor 50mg q6, Hydralazine and topical nitrates added


- anxiety may also be playing a part in elevated blood pressure


- will add low dose Ativan PRN





Hyperglycemia - resolved


Hypoglycemia


4/1


- Ha1c = 6.5%


- due to recent stroke and Type 2 MI, will need metformin prior to discharge


- add statin


- add ACE-I as outpatient





 3/31 - will likely need metformin on discharge due to multiple co-morbidities 

and Ha1c of 6.5%


3/30 - off of D5 fluid - sugars are improving and within goal range - 

appreciate pharmacy input


3/29 - blood sugar is now on the lower end - trying low dose D5, monitor blood 

sugars, should improve as she is eating now


3/28 - likely due to dextrose in tube feeds - consult speech - consult pharmacy 

glycemic control


 


Acute Kidney Injury - resolved


- resolved now; initially due to acute tubular necrosis, possible medication or 

IV contrast induced


- was given IVFs due to sepsis





Hepatic Congestion


- from biventricular heart failure


- monitor for fluid overload





Anemia - improving


- likely iron deficiency


- H/H stable





Thrombocytopenia - resolved





Abnormal Thyroid Function Test


- TSH continues to be low on 4/1


- Free T4 is now low, initially was elevated


- as per patient's daughter, who is a physician, patient's record indicate she 

has had issues to hyperthyroid in the past


- will restart methimazole 5mg TID


- may need thyroid ultrasound, thyroid scan as outpatient


- outpatient endocrinology input





DVT ppx


- IV heparin





FULL CODE





PT/OT/speech ordered














Clinical course obtained from chart review:





March 19, 2018


Presented on 3/19 with significant bilateral lower extremity edema, fevers, 

chills, abdominal distention, nausea.





March 20, 2018


Patient admitted on 3/20 due to meeting severe sepsis criteria. She had 

significant lactic acidosis, tachycardia; later developed hypotension, 

leukocytosis, and elevated procalcitonin.


She was started on broad spectrum antibiotics, initially to cover cellulitis.





Imaging suggested shock bowel, hypotensive bowel - initial concern of liver 

cirrhosis. Further testing suggested significant R heart failure with hepatic 

congestion. Edematous lower extremities likely from the biventricular heart 

failure noted. Echo was done to confirm this diagnosis. LVEF at the time noted 

to be 35-40%. Dilatation of R atrium and R ventricle noted as well. Late on 3/20

, patient developed A. Fib with RVR and transferred to the ICU.





March 21, 2018


Patient spontaneously converted to sinus rhythm after being given IV Lopressor 

x2 and Cardizem ggt, which had to be stopped due to low blood pressure.


Troponin elevation noted, likely type 2 demand ischemia from infection, 

arrhythmia, sepsis.


Patient was started on IV heparin.





Patient was found to be positive for C. Diff Infection; CT suggested colitis. 

NGT was placed in the ICU and Vanco solution was given to treat this.


Blood cultures were positive at this time for gram negative organism.





Patient's mental status acutely worsened. Neurology was consulted and an MRI 

and EEG were performed. Attributed mental status change to metabolic 

encephalopathy.





Kidney function also acutely worsened; possibly from IV contrast from imaging 

done prior.





March 22, 2018


Nephrology consulted due to acute kidney injury and acute tubular necrosis. 

Consideration made for dialysis; though this was never required.


ID consulted - E. coli septicemia noted on blood cultures x2, repeat cultures 

were ordered.


Nephrology consulted - metabolic acidosis - possibly from sepsis and BERNABE.





March 23, 2018


General surgery consulted regarding C. Diff colitis, conservative management 

decided.


Patient at this time was going in and out of A. fib; initially started on IV 

Cardizem, but due to post-conversion pauses, this was stopped.


Was started on bicarb drip due to acidosis.


ID following for UTI, C. Diff colitis, E. coli septicemia.





March 24, 2018


Clinical improvement made on March 24 mentally.


Hypertension was still an issue and cardiology worked on getting this down by 

increasing b-blocker dose and adding hydralazine.





March 25, 2018


continuing PO Vancomycin and IV Rocephin


IV heparin stopped at this time. Eliquis had been started.


clinical picture continued to improve as per records.





March 26, 2018


Repeat TTE was done; improving biventricular heart failure noted.


Hypertensive Urgency still an issue on this day; cardiology adjusted meds to 

add nitropaste and Hydralazine, as well as an increase in Lopressor dose.
Subjective


Date of Service:


Apr 2, 2018.


Subjective


Pt evaluation today including:  conversation w/ patient, physical exam, lab 

review, review of studies, review of inpatient medication list


Saw/examined the patient in room 376


+weakness and tired


+L foot pain, states she was diagnosed with tendonitis in the past


Denies chest pain/shortness of breath/palpitations


Diarrhea improving





Problem List


Medical Problems:


(1) Anasarca


Status: Acute  





(2) Atrial fibrillation with RVR


Status: Acute  





(3) C. difficile diarrhea


Status: Acute  





(4) Elevated troponin


Status: Acute  





(5) Hyperthyroidism


Status: Acute  





(6) Hypotension


Status: Acute  





(7) Left leg cellulitis


Status: Acute  





(8) Left leg pain


Status: Acute  





(9) Right-sided heart failure


Status: Acute  





(10) Sepsis


Status: Acute  











Review of Systems


Constitutional:  + weakness, + fatigue, No fever, No chills


Respiratory:  No shortness of breath


Cardiac:  No chest pain


Abdomen:  + diarrhea, No pain, No nausea, No vomiting


Heme:  No abnormal bleeding/bruising





Medications





Current Inpatient Medications








 Medications


  (Trade)  Dose


 Ordered  Sig/Wilman


 Route  Start Time


 Stop Time Status Last Admin


Dose Admin


 


 Acetaminophen


  (Tylenol Tab)  325 mg  Q6H  PRN


 PO  3/20/18 03:15


 4/19/18 03:14  4/2/18 05:35


325 MG


 


 Pantoprazole


 Sodium 40 mg/


 Syringe  10 ml @ 5


 mls/min  DAILY@11


 IV  3/21/18 11:00


 4/20/18 10:59  4/1/18 13:33


5 MLS/MIN


 


 Vancomycin HCl


  (Vancomycin Oral


 Soln)  500 mg  QID


 PO  3/22/18 17:00


 4/5/18 16:59  4/1/18 21:04


500 MG


 


 Hydromorphone HCl


  (Dilaudid Inj)  0.5 mg  Q3H  PRN


 IV  3/23/18 10:45


 4/6/18 10:44  3/27/18 07:56


0.5 MG


 


 Ondansetron HCl 6


 mg/Dextrose  53 ml @ 


 200 mls/hr  Q6H  PRN


 IV  3/25/18 00:30


 4/24/18 00:29  3/25/18 07:48


200 MLS/HR


 


 Miscellaneous


 Information


  (Consult


 Glycemic


 Management


 Pharmacy)  1 ea  UD  PRN


 N/A  3/27/18 09:32


 4/26/18 09:31   


 


 


 Glucose


  (Glucose 40% Gel)  15-30


 GRAMS 15


 GRAMS...  UD  PRN


 PO  3/27/18 20:00


 4/26/18 19:59   


 


 


 Glucose


  (Glucose Chew


 Tab)  4-8


 Tablets 4


 Tabl...  UD  PRN


 PO  3/27/18 20:00


 4/26/18 19:59   


 


 


 Dextrose


  (Dextrose 50%


 50ML Syringe)  25-50ML OF


 50% DW IV


 FOR...  UD  PRN


 IV  3/27/18 20:00


 4/26/18 19:59   


 


 


 Glucagon


  (Glucagon Inj)  1 mg  UD  PRN


 SQ  3/27/18 20:00


 4/26/18 19:59   


 


 


 Heparin Sodium/


 Dextrose  500 ml @ 


 13 mls/hr  Q24H


 IV  3/27/18 23:30


 4/26/18 23:29  4/1/18 20:56


13 MLS/HR


 


 Ipratropium


 Bromide


  (Atrovent 0.02%


 0.5MG/2.5ML Neb)  0.5 mg  Q4H  PRN


 INH  3/28/18 00:15


 4/27/18 00:14   


 


 


 Levalbuterol


  (Xopenex 1.25MG/


 0.5ML Neb)  1.25 mg  Q4H  PRN


 INH  3/28/18 00:15


 4/27/18 00:14   


 


 


 Insulin Aspart


  (novoLOG ASPART)  SLIDING


 SCALE  ACHS


 SC  3/29/18 07:00


 4/28/18 06:59  4/1/18 18:56


5 UNITS


 


 Raspberry


  (Raspberry Syrup


 5ml Cup)  2.5 ml  QID


 PO  3/30/18 09:00


 4/13/18 08:59  4/1/18 21:04


2.5 ML


 


 Hydralazine HCl


  (Apresoline Tab)  50 mg  TID


 PO  3/31/18 14:00


 4/28/18 13:59  4/2/18 08:10


50 MG


 


 Isosorbide


 Mononitrate


  (Imdur Ext Rel


 Tab)  30 mg  QAM


 PO  4/1/18 09:00


 5/1/18 08:59  4/2/18 08:09


30 MG


 


 Metoprolol


 Tartrate


  (Lopressor Tab)  50 mg  BID


 PO  4/1/18 09:00


 5/1/18 08:59  4/2/18 08:09


50 MG


 


 Methimazole


  (Methimazole Tab)  5 mg  TID


 PO  4/1/18 14:00


 5/1/18 13:59  4/2/18 08:08


5 MG


 


 Magnesium Sulfate


 1 gm/Prmx  100 ml @ 


 100 mls/hr  Q1H


 IV  4/2/18 08:30


 4/2/18 10:29  4/2/18 08:17


100 MLS/HR


 


 Atorvastatin


 Calcium


  (Lipitor Tab)  40 mg  QAM


 PO  4/2/18 09:00


 5/2/18 08:59  4/2/18 09:51


40 MG


 


 Cholecalciferol


  (Vitamin D Tab)  1,000


 inter.unit  QAM


 PO  4/3/18 09:00


 5/3/18 08:59 UNV  


 


 


 Ceftriaxone


 Sodium 2 gm/


 Dextrose  50 ml @ 


 100 mls/hr  DAILY@0900


 IV  4/2/18 10:00


 4/5/18 23:59   


 











Objective


Vital Signs











  Date Time  Temp Pulse Resp B/P (MAP) Pulse Ox O2 Delivery O2 Flow Rate FiO2


 


4/2/18 09:14    115/60 (78)    


 


4/2/18 09:07     94 Room Air  


 


4/2/18 07:39 37.1 63 18 180/87 (118) 94 Room Air  


 


4/1/18 23:35      Room Air  


 


4/1/18 22:52 37.2 68 20 132/80 (97) 94 Room Air  


 


4/1/18 21:07  70  155/79 (104)    


 


4/1/18 15:30      Room Air  


 


4/1/18 15:12 36.7 61 16 143/69 (93) 96 Room Air  











Physical Exam


General Appearance:  no apparent distress


Respiratory/Chest:  chest non-tender, no respiratory distress, no accessory 

muscle use, + decreased breath sounds


Cardiovascular:  regular rate, rhythm, no edema, no murmur





Laboratory Results





Last 24 Hours








Test


  4/1/18


12:26 4/1/18


16:44 4/1/18


20:37 4/2/18


06:15


 


Bedside Glucose 114 mg/dl  163 mg/dl  137 mg/dl  


 


White Blood Count    12.00 K/uL 


 


Red Blood Count    4.17 M/uL 


 


Hemoglobin    11.8 g/dL 


 


Hematocrit    34.9 % 


 


Mean Corpuscular Volume    83.7 fL 


 


Mean Corpuscular Hemoglobin    28.3 pg 


 


Mean Corpuscular Hemoglobin


Concent 


  


  


  33.8 g/dl 


 


 


RDW Standard Deviation    43.8 fL 


 


RDW Coefficient of Variation    14.8 % 


 


Platelet Count    635 K/uL 


 


Mean Platelet Volume    11.1 fL 


 


Activated Partial


Thromboplast Time 


  


  


  46.9 SECONDS 


 


 


Partial Thromboplastin Ratio    1.8 


 


Sodium Level    140 mmol/L 


 


Potassium Level    3.9 mmol/L 


 


Chloride Level    106 mmol/L 


 


Carbon Dioxide Level    26 mmol/L 


 


Anion Gap    9.0 mmol/L 


 


Blood Urea Nitrogen    12 mg/dl 


 


Creatinine    0.68 mg/dl 


 


Est Creatinine Clear Calc


Drug Dose 


  


  


  68.9 ml/min 


 


 


Estimated GFR (


American) 


  


  


  110.2 


 


 


Estimated GFR (Non-


American 


  


  


  95.1 


 


 


BUN/Creatinine Ratio    17.7 


 


Random Glucose    99 mg/dl 


 


Calcium Level    8.5 mg/dl 


 


Magnesium Level    1.7 mg/dl 


 


25-Hydroxy Vitamin D Total    14.5 ng/ml 


 


Test


  4/2/18


08:12 


  


  


 


 


Bedside Glucose 86 mg/dl    











Assessment and Plan


This is a 62 year old female with no past medical history prior to arrival.





Biventricular Heart Failure


Bilateral Pleural Effusions - improving


4/2


- continue Lasix as needed


- outpatient ACE-I in a few weeks





4/1


- thoracentesis canceled; will continue Lasix


- will likely need Lasix 20mg every other day as outpatient


- appreciate cardiology input


- as outpatient begin ACE-I





 3/31 - plan for thoracentesis - intermittent Lasix may be needed - as 

outpatient, should start ACE-I


3/30 - will consult pulm regarding bilateral pleural effusion and possible need 

for thoracentesis - appreciate cardiology input - intermittently receiving Lasix

, received some last evening


3/29 - persistent bilateral pleural effusions - pulmonary consulted for 

possible thoracentesis on one side


3/28 - required IV Lasix yesterday - monitor fluid status


3/27 - patient presented with worsening lower extremity edema, hepatic 

congestion - echo initially suggested significant R ventricular systolic 

function and decreased LVEF - started on b-blocker, treated for infection, etc. 

- repeat echo was performed on 3/26 with improvement of both ventricular 

function - further management as per cardiology 





Metabolic Encephalopathy - resolved


Bilateral Cerebellar Hemisphere Infarcts


4/2


- transition to Eliquis when okay with cardiology


- started Lipitor





4/1


- continue IV heparin for now


- transition to Eliquis prior to discharge


- PT/OT


- outpatient neurology input





 3/31 - neurology has signed off - currently heparin ggt on hold due to 

thoracentesis - will restart IV Heparin after tap when okay with cardiothoracic 

surgery - eventual plan to place her on Eliquis


3/30 - appreciate neurology input - will need continued PT/OT/speech 

evaluations - currently on IV heparin; will transition to oral anticoagulant 

prior to discharge


3/29 - Brain MRI suggests bilateral cerebellar infarcts - appreciate neurology 

evaluation - currently on IV heparin - will continue speech/PT/OT - clinically 

improving


3/28 - Head CT performed late on 3/27, suggesting possible subacute infarct - 

Brain MRI could not be performed; dental implant? - repeat Head CT in 1-2 days 

- appreciate neurology input - started on IV heparin at this time due to 

pulling of NGT and failing swallow evaluation - speech eval - PT/OT


3/27 - patient with confusion, disorientation - possibly related to Dilaudid use

, also has had sepsis, hypotension episodes, BERNABE earlier in the admission - 

appreciate neurology input on this matter, no need for further imaging - Brain 

MRI, Head CT already performed, EEG reviewed by neurology - slow improvement - 

may need Ativan for significant anxiety as well 





Severe Sepsis, E. coli Septicemia


Severe C. Diff Colitis - improving


4/2


- continue Rocephin; will need until 4/4


- continue PO Vancomycin for a total of 28 days





4/1


- continue Rocephin for the E. coli septicemia (day #10/14)


- continue PO Vancomycin for C. diff - continue for a total of 28 days





 3/31 - white count continues to improve - continue Rocephin plus PO Vanco


3/30 - white count improving to 16k from 19k - will need Rocephin x14 days from 

last negative blood culture (day #9/14) - for the C. Diff patient is on oral 

Vanco and IV Flagyl; will d/c Flagyl in AM (3/31)


3/29 - decrease in WBC count today - continue Rocephin for 14 days after the 

last negative blood culture - continue oral Vanco for the C. Diff - labial fold 

swelling noted; possible Bartholin abscess? - consulted OB/gyn and added warm 

compresses to the region


3/28 - WBC increasing - will repeat abdomen/pelvis CT - general surgery for 

further input - continue IV Rocephin; unfortunately, pulled NGT, so can't do 

oral Vanco, currently on IV Flagyl


3/27 - blood cultures positive for E. coli; repeat blood cultures - no growth - 

C. Diff positive, colitis noted on imaging - started on IV Flagyl initially, 

converted to PO Vanco - appreciate ID input - will need PO Vanco for 28 days 

total - will need Rocephin for 14 days since the negative blood culture on 3/22 

- appreciate general surgery input regarding C. Diff - no surgical intervention 

at this time - will monitor abdominal firmness/distention - if no improvement, 

repeat abd/pelvis CT can be done 





Paroxysmal A. Fib with RVR - resolved


4/1


- continue IV heparin, transition to Eliquis


- continue Lopressor





 3/31 - continue b-blocker, currently NSR


3/30 - currently in NSR, rate controlled with b-blocker - IV heparin ggt at 

this time


3/29 - currently on PO Metoprolol as per cardiology, appreciate input - IV 

heparin at this time


3/28 - Eliquis stopped - awaiting speech evaluation - IV heparin started - will 

need either IV Lopressor or re-insertion of NGT


3/27 - intermittent A. fib and rapid ventricular response; likely from sepsis - 

currently in NSR - appreciate cardiology input - continue Lopressor for rate 

control - Eliquis for anticoagulation - had required IV Lopressor and Cardizem 

ggt - no longer should receive Cardizem due to pauses noted earlier in the 

admission 





Type 2 Demand Ischemia


4/1


- continue Imdur, b-blocker, likely would benefit from statin addition prior to 

discharge





- troponin elevation when she first came in - likely secondary to infection, 

anemia, etc.


- initially was on IV heparin, now back on it


- now off of Eliquis


- continue b-blocker


- treating underlying infection





Hypertensive Urgency - improving


4/1


- blood pressure continues to be elevated


- likely from hyperthyroid


- starting methimazole


- continue Hydralazine, Imdur, Metoprolol





3/30


- appreciate cardio input regarding blood pressure


- on nitro paste and Hydralazine as well as b-blocker


- will increase hydralazine dose at this time





3/29


- appreciate cardiology input


- currently attempting nitro paste and hydralazine for blood pressure control


- holding off on ACE-I in the short term as per nephrology





3/28


- BP yesterday >200/100


- appreciate cardiology input


- started on Lopressor 50mg q6, Hydralazine and topical nitrates added


- anxiety may also be playing a part in elevated blood pressure


- will add low dose Ativan PRN





Hyperglycemia - resolved


Hypoglycemia


4/1


- Ha1c = 6.5%


- due to recent stroke and Type 2 MI, will need metformin prior to discharge


- add statin


- add ACE-I as outpatient





 3/31 - will likely need metformin on discharge due to multiple co-morbidities 

and Ha1c of 6.5%


3/30 - off of D5 fluid - sugars are improving and within goal range - 

appreciate pharmacy input


3/29 - blood sugar is now on the lower end - trying low dose D5, monitor blood 

sugars, should improve as she is eating now


3/28 - likely due to dextrose in tube feeds - consult speech - consult pharmacy 

glycemic control


 


Acute Kidney Injury - resolved


- resolved now; initially due to acute tubular necrosis, possible medication or 

IV contrast induced


- was given IVFs due to sepsis





Hepatic Congestion


- from biventricular heart failure


- monitor for fluid overload





Anemia - improving


- likely iron deficiency


- H/H stable





Thrombocytopenia - resolved





Abnormal Thyroid Function Test


- TSH continues to be low on 4/1


- Free T4 is now low, initially was elevated


- as per patient's daughter, who is a physician, patient's record indicate she 

has had issues to hyperthyroid in the past


- will restart methimazole 5mg TID


- may need thyroid ultrasound, thyroid scan as outpatient


- outpatient endocrinology input





Vitamin D deficiency


- started on Vitamin D supplementation, repeat Vitamin D levels in 3 months





DVT ppx


- IV heparin





FULL CODE





PT/OT/speech ordered














Clinical course obtained from chart review:





March 19, 2018


Presented on 3/19 with significant bilateral lower extremity edema, fevers, 

chills, abdominal distention, nausea.





March 20, 2018


Patient admitted on 3/20 due to meeting severe sepsis criteria. She had 

significant lactic acidosis, tachycardia; later developed hypotension, 

leukocytosis, and elevated procalcitonin.


She was started on broad spectrum antibiotics, initially to cover cellulitis.





Imaging suggested shock bowel, hypotensive bowel - initial concern of liver 

cirrhosis. Further testing suggested significant R heart failure with hepatic 

congestion. Edematous lower extremities likely from the biventricular heart 

failure noted. Echo was done to confirm this diagnosis. LVEF at the time noted 

to be 35-40%. Dilatation of R atrium and R ventricle noted as well. Late on 3/20

, patient developed A. Fib with RVR and transferred to the ICU.





March 21, 2018


Patient spontaneously converted to sinus rhythm after being given IV Lopressor 

x2 and Cardizem ggt, which had to be stopped due to low blood pressure.


Troponin elevation noted, likely type 2 demand ischemia from infection, 

arrhythmia, sepsis.


Patient was started on IV heparin.





Patient was found to be positive for C. Diff Infection; CT suggested colitis. 

NGT was placed in the ICU and Vanco solution was given to treat this.


Blood cultures were positive at this time for gram negative organism.





Patient's mental status acutely worsened. Neurology was consulted and an MRI 

and EEG were performed. Attributed mental status change to metabolic 

encephalopathy.





Kidney function also acutely worsened; possibly from IV contrast from imaging 

done prior.





March 22, 2018


Nephrology consulted due to acute kidney injury and acute tubular necrosis. 

Consideration made for dialysis; though this was never required.


ID consulted - E. coli septicemia noted on blood cultures x2, repeat cultures 

were ordered.


Nephrology consulted - metabolic acidosis - possibly from sepsis and BERNABE.





March 23, 2018


General surgery consulted regarding C. Diff colitis, conservative management 

decided.


Patient at this time was going in and out of A. fib; initially started on IV 

Cardizem, but due to post-conversion pauses, this was stopped.


Was started on bicarb drip due to acidosis.


ID following for UTI, C. Diff colitis, E. coli septicemia.





March 24, 2018


Clinical improvement made on March 24 mentally.


Hypertension was still an issue and cardiology worked on getting this down by 

increasing b-blocker dose and adding hydralazine.





March 25, 2018


continuing PO Vancomycin and IV Rocephin


IV heparin stopped at this time. Eliquis had been started.


clinical picture continued to improve as per records.





March 26, 2018


Repeat TTE was done; improving biventricular heart failure noted.


Hypertensive Urgency still an issue on this day; cardiology adjusted meds to 

add nitropaste and Hydralazine, as well as an increase in Lopressor dose.
Subjective


Date of Service:


Apr 3, 2018.


Subjective


Pt evaluation today including:  conversation w/ patient, physical exam, lab 

review, review of studies, conversation w/ consultant, review of inpatient 

medication list


Saw/examined the patient in room 376


She's doing well, denies chest pain/shortness of breath/palpitations


+weakness persists


+diarrhea improved





Problem List


Medical Problems:


(1) Anasarca


Status: Acute  





(2) Atrial fibrillation with RVR


Status: Acute  





(3) C. difficile diarrhea


Status: Acute  





(4) Elevated troponin


Status: Acute  





(5) Hyperthyroidism


Status: Acute  





(6) Hypotension


Status: Acute  





(7) Left leg cellulitis


Status: Acute  





(8) Left leg pain


Status: Acute  





(9) Right-sided heart failure


Status: Acute  





(10) Sepsis


Status: Acute  











Review of Systems


Constitutional:  + weakness, + fatigue, No fever, No chills


Respiratory:  No cough, No sputum, No wheezing, No shortness of breath, No 

dyspnea on exertion, No dyspnea at rest, No hemoptysis


Cardiac:  No chest pain, No edema, No palpitations


Abdomen:  No pain, No nausea, No vomiting, No diarrhea, No constipation, No GI 

bleeding





Medications





Current Inpatient Medications








 Medications


  (Trade)  Dose


 Ordered  Sig/Wilman


 Route  Start Time


 Stop Time Status Last Admin


Dose Admin


 


 Acetaminophen


  (Tylenol Tab)  325 mg  Q6H  PRN


 PO  3/20/18 03:15


 4/19/18 03:14  4/2/18 05:35


325 MG


 


 Vancomycin HCl


  (Vancomycin Oral


 Soln)  500 mg  QID


 PO  3/22/18 17:00


 4/5/18 16:59  4/3/18 08:29


500 MG


 


 Hydromorphone HCl


  (Dilaudid Inj)  0.5 mg  Q3H  PRN


 IV  3/23/18 10:45


 4/6/18 10:44  3/27/18 07:56


0.5 MG


 


 Ondansetron HCl 6


 mg/Dextrose  53 ml @ 


 200 mls/hr  Q6H  PRN


 IV  3/25/18 00:30


 4/24/18 00:29  3/25/18 07:48


200 MLS/HR


 


 Miscellaneous


 Information


  (Consult


 Glycemic


 Management


 Pharmacy)  1 ea  UD  PRN


 N/A  3/27/18 09:32


 4/26/18 09:31   


 


 


 Glucose


  (Glucose 40% Gel)  15-30


 GRAMS 15


 GRAMS...  UD  PRN


 PO  3/27/18 20:00


 4/26/18 19:59   


 


 


 Glucose


  (Glucose Chew


 Tab)  4-8


 Tablets 4


 Tabl...  UD  PRN


 PO  3/27/18 20:00


 4/26/18 19:59   


 


 


 Dextrose


  (Dextrose 50%


 50ML Syringe)  25-50ML OF


 50% DW IV


 FOR...  UD  PRN


 IV  3/27/18 20:00


 4/26/18 19:59   


 


 


 Glucagon


  (Glucagon Inj)  1 mg  UD  PRN


 SQ  3/27/18 20:00


 4/26/18 19:59   


 


 


 Ipratropium


 Bromide


  (Atrovent 0.02%


 0.5MG/2.5ML Neb)  0.5 mg  Q4H  PRN


 INH  3/28/18 00:15


 4/27/18 00:14   


 


 


 Levalbuterol


  (Xopenex 1.25MG/


 0.5ML Neb)  1.25 mg  Q4H  PRN


 INH  3/28/18 00:15


 4/27/18 00:14   


 


 


 Insulin Aspart


  (novoLOG ASPART)  SLIDING


 SCALE  ACHS


 SC  3/29/18 07:00


 4/28/18 06:59  4/3/18 08:45


3 UNITS


 


 Raspberry


  (Raspberry Syrup


 5ml Cup)  2.5 ml  QID


 PO  3/30/18 09:00


 4/13/18 08:59  4/3/18 08:29


2.5 ML


 


 Hydralazine HCl


  (Apresoline Tab)  50 mg  TID


 PO  3/31/18 14:00


 4/28/18 13:59  4/3/18 08:17


50 MG


 


 Metoprolol


 Tartrate


  (Lopressor Tab)  50 mg  BID


 PO  4/1/18 09:00


 5/1/18 08:59  4/3/18 08:16


50 MG


 


 Methimazole


  (Methimazole Tab)  5 mg  TID


 PO  4/1/18 14:00


 5/1/18 13:59  4/3/18 08:16


5 MG


 


 Atorvastatin


 Calcium


  (Lipitor Tab)  40 mg  QAM


 PO  4/2/18 09:00


 5/2/18 08:59  4/3/18 08:17


40 MG


 


 Cholecalciferol


  (Vitamin D Tab)  1,000


 inter.unit  QAM


 PO  4/3/18 09:00


 5/3/18 08:59  4/3/18 08:16


1,000 INTER.UNIT


 


 Ceftriaxone


 Sodium 2 gm/


 Dextrose  50 ml @ 


 100 mls/hr  DAILY@0900


 IV  4/2/18 10:00


 4/5/18 23:59  4/3/18 08:28


100 MLS/HR


 


 Apixaban


  (Eliquis Tab)  5 mg  BID


 PO  4/2/18 23:00


 5/2/18 22:59  4/3/18 08:17


5 MG


 


 Furosemide


  (Lasix Tab)  20 mg  MoWeFr@0900


 PO  4/4/18 09:00


 5/4/18 08:59   


 


 


 Pantoprazole


 Sodium


  (Protonix Tab)  40 mg  QAM


 PO  4/3/18 09:00


 5/3/18 08:59  4/3/18 08:17


40 MG


 


 Isosorbide


 Mononitrate


  (Imdur Ext Rel


 Tab)  60 mg  QAM


 PO  4/4/18 09:00


 5/1/18 08:59   


 











Objective


Vital Signs











  Date Time  Temp Pulse Resp B/P (MAP) Pulse Ox O2 Delivery O2 Flow Rate FiO2


 


4/3/18 08:00      Room Air  


 


4/3/18 07:48 36.8 66 18 172/85 (114) 95 Room Air  


 


4/2/18 23:55      Room Air  


 


4/2/18 23:45 37.2 68 16 158/81 (106) 96 Room Air  


 


4/2/18 19:20  66  161/83 (109)    


 


4/2/18 16:26 36.9 65 18 141/68 (92) 95 Room Air  


 


4/2/18 15:30      Room Air  


 


4/2/18 13:15    137/76 (96)    











Physical Exam


General Appearance:  no apparent distress, + cachetic, + thin, + pertinent 

finding (ill appearing, +weak)


Respiratory/Chest:  no respiratory distress, no accessory muscle use, + 

decreased breath sounds


Cardiovascular:  regular rate, rhythm, no edema, no murmur


Abdomen:  normal bowel sounds, non tender, soft


Extremities:  normal inspection, no pedal edema, + pertinent finding (tender 

ankle/left achilles)


Neurologic/Psychiatric:  no motor/sensory deficits, alert, normal mood/affect


Skin:  normal color





Laboratory Results





Last 24 Hours








Test


  4/2/18


12:07 4/2/18


17:16 4/2/18


20:35 4/3/18


06:50


 


Bedside Glucose 165 mg/dl  109 mg/dl  131 mg/dl  


 


White Blood Count    9.39 K/uL 


 


Red Blood Count    3.97 M/uL 


 


Hemoglobin    10.9 g/dL 


 


Hematocrit    33.6 % 


 


Mean Corpuscular Volume    84.6 fL 


 


Mean Corpuscular Hemoglobin    27.5 pg 


 


Mean Corpuscular Hemoglobin


Concent 


  


  


  32.4 g/dl 


 


 


RDW Standard Deviation    44.3 fL 


 


RDW Coefficient of Variation    15.0 % 


 


Platelet Count    595 K/uL 


 


Mean Platelet Volume    11.1 fL 


 


Prothrombin Time    11.4 SECONDS 


 


Prothromb Time International


Ratio 


  


  


  1.1 


 


 


Activated Partial


Thromboplast Time 


  


  


  32.4 SECONDS 


 


 


Partial Thromboplastin Ratio    1.2 


 


Sodium Level    141 mmol/L 


 


Potassium Level    4.0 mmol/L 


 


Chloride Level    108 mmol/L 


 


Carbon Dioxide Level    27 mmol/L 


 


Anion Gap    6.0 mmol/L 


 


Blood Urea Nitrogen    11 mg/dl 


 


Creatinine    0.77 mg/dl 


 


Est Creatinine Clear Calc


Drug Dose 


  


  


  58.5 ml/min 


 


 


Estimated GFR (


American) 


  


  


  97.3 


 


 


Estimated GFR (Non-


American 


  


  


  83.9 


 


 


BUN/Creatinine Ratio    14.8 


 


Random Glucose    89 mg/dl 


 


Calcium Level    8.6 mg/dl 


 


Magnesium Level    2.0 mg/dl 


 


Test


  4/3/18


08:09 


  


  


 


 


Bedside Glucose 81 mg/dl    











Assessment and Plan


This is a 62 year old female with no past medical history prior to arrival.





Biventricular Heart Failure


Bilateral Pleural Effusions - improving


4/3


- Lasix every other day for the pleural effusions and the heart failure


- continue Lopressor


- as outpatient start ACE-I on 4/18 4/2


- continue Lasix as needed


- outpatient ACE-I in a few weeks





4/1


- thoracentesis canceled; will continue Lasix


- will likely need Lasix 20mg every other day as outpatient


- appreciate cardiology input


- as outpatient begin ACE-I





 3/31 - plan for thoracentesis - intermittent Lasix may be needed - as 

outpatient, should start ACE-I


3/30 - will consult pulm regarding bilateral pleural effusion and possible need 

for thoracentesis - appreciate cardiology input - intermittently receiving Lasix

, received some last evening


3/29 - persistent bilateral pleural effusions - pulmonary consulted for 

possible thoracentesis on one side


3/28 - required IV Lasix yesterday - monitor fluid status


3/27 - patient presented with worsening lower extremity edema, hepatic 

congestion - echo initially suggested significant R ventricular systolic 

function and decreased LVEF - started on b-blocker, treated for infection, etc. 

- repeat echo was performed on 3/26 with improvement of both ventricular 

function - further management as per cardiology 





Metabolic Encephalopathy - resolved


Bilateral Cerebellar Hemisphere Infarcts


4/3


- patient to be on Eliquis 2.5 mg BID for A. Fib and CVA


- continue Lipitor as outpatient





4/2


- transition to Eliquis when okay with cardiology


- started Lipitor





4/1


- continue IV heparin for now


- transition to Eliquis prior to discharge


- PT/OT


- outpatient neurology input





 3/31 - neurology has signed off - currently heparin ggt on hold due to 

thoracentesis - will restart IV Heparin after tap when okay with cardiothoracic 

surgery - eventual plan to place her on Eliquis


3/30 - appreciate neurology input - will need continued PT/OT/speech 

evaluations - currently on IV heparin; will transition to oral anticoagulant 

prior to discharge


3/29 - Brain MRI suggests bilateral cerebellar infarcts - appreciate neurology 

evaluation - currently on IV heparin - will continue speech/PT/OT - clinically 

improving


3/28 - Head CT performed late on 3/27, suggesting possible subacute infarct - 

Brain MRI could not be performed; dental implant? - repeat Head CT in 1-2 days 

- appreciate neurology input - started on IV heparin at this time due to 

pulling of NGT and failing swallow evaluation - speech eval - PT/OT


3/27 - patient with confusion, disorientation - possibly related to Dilaudid use

, also has had sepsis, hypotension episodes, BERNABE earlier in the admission - 

appreciate neurology input on this matter, no need for further imaging - Brain 

MRI, Head CT already performed, EEG reviewed by neurology - slow improvement - 

may need Ativan for significant anxiety as well 





Severe Sepsis, E. coli Septicemia


Severe C. Diff Colitis - improving


4/3


- Rocephin on 4/4 and 4/5 then stop


- PO Vanco until April 18 4/2


- continue Rocephin; will need until 4/4


- continue PO Vancomycin for a total of 28 days





4/1


- continue Rocephin for the E. coli septicemia (day #10/14)


- continue PO Vancomycin for C. diff - continue for a total of 28 days





 3/31 - white count continues to improve - continue Rocephin plus PO Vanco


3/30 - white count improving to 16k from 19k - will need Rocephin x14 days from 

last negative blood culture (day #9/14) - for the C. Diff patient is on oral 

Vanco and IV Flagyl; will d/c Flagyl in AM (3/31)


3/29 - decrease in WBC count today - continue Rocephin for 14 days after the 

last negative blood culture - continue oral Vanco for the C. Diff - labial fold 

swelling noted; possible Bartholin abscess? - consulted OB/gyn and added warm 

compresses to the region


3/28 - WBC increasing - will repeat abdomen/pelvis CT - general surgery for 

further input - continue IV Rocephin; unfortunately, pulled NGT, so can't do 

oral Vanco, currently on IV Flagyl


3/27 - blood cultures positive for E. coli; repeat blood cultures - no growth - 

C. Diff positive, colitis noted on imaging - started on IV Flagyl initially, 

converted to PO Vanco - appreciate ID input - will need PO Vanco for 28 days 

total - will need Rocephin for 14 days since the negative blood culture on 3/22 

- appreciate general surgery input regarding C. Diff - no surgical intervention 

at this time - will monitor abdominal firmness/distention - if no improvement, 

repeat abd/pelvis CT can be done 





Paroxysmal A. Fib with RVR - resolved


4/3


- currently NSR


- continue Eliquis and Lopressor





4/1


- continue IV heparin, transition to Eliquis


- continue Lopressor





 3/31 - continue b-blocker, currently NSR


3/30 - currently in NSR, rate controlled with b-blocker - IV heparin ggt at 

this time


3/29 - currently on PO Metoprolol as per cardiology, appreciate input - IV 

heparin at this time


3/28 - Eliquis stopped - awaiting speech evaluation - IV heparin started - will 

need either IV Lopressor or re-insertion of NGT


3/27 - intermittent A. fib and rapid ventricular response; likely from sepsis - 

currently in NSR - appreciate cardiology input - continue Lopressor for rate 

control - Eliquis for anticoagulation - had required IV Lopressor and Cardizem 

ggt - no longer should receive Cardizem due to pauses noted earlier in the 

admission 





Type 2 Demand Ischemia


4/3


- added statin, continue b-blocker, Imdur





4/1


- continue Imdur, b-blocker, likely would benefit from statin addition prior to 

discharge





- troponin elevation when she first came in - likely secondary to infection, 

anemia, etc.


- initially was on IV heparin, now back on it


- now off of Eliquis


- continue b-blocker


- treating underlying infection





Hypertensive Urgency - improving


4/3


- added methimazole


- Hydralazine, Imdur, Metoprolol





4/1


- blood pressure continues to be elevated


- likely from hyperthyroid


- starting methimazole


- continue Hydralazine, Imdur, Metoprolol





3/30


- appreciate cardio input regarding blood pressure


- on nitro paste and Hydralazine as well as b-blocker


- will increase hydralazine dose at this time





3/29


- appreciate cardiology input


- currently attempting nitro paste and hydralazine for blood pressure control


- holding off on ACE-I in the short term as per nephrology





3/28


- BP yesterday >200/100


- appreciate cardiology input


- started on Lopressor 50mg q6, Hydralazine and topical nitrates added


- anxiety may also be playing a part in elevated blood pressure


- will add low dose Ativan PRN





Hyperglycemia - resolved


Hypoglycemia


4/3


- metformin started


- add ACE-I as outpatient


- monitor kidneys





4/1


- Ha1c = 6.5%


- due to recent stroke and Type 2 MI, will need metformin prior to discharge


- add statin


- add ACE-I as outpatient





 3/31 - will likely need metformin on discharge due to multiple co-morbidities 

and Ha1c of 6.5%


3/30 - off of D5 fluid - sugars are improving and within goal range - 

appreciate pharmacy input


3/29 - blood sugar is now on the lower end - trying low dose D5, monitor blood 

sugars, should improve as she is eating now


3/28 - likely due to dextrose in tube feeds - consult speech - consult pharmacy 

glycemic control


 


Acute Kidney Injury - resolved


- resolved now; initially due to acute tubular necrosis, possible medication or 

IV contrast induced


- was given IVFs due to sepsis





Hepatic Congestion


- from biventricular heart failure


- monitor for fluid overload





Anemia - improving


- likely iron deficiency


- H/H stable





Thrombocytopenia - resolved





Abnormal Thyroid Function Test


- TSH continues to be low on 4/1


- Free T4 is now low, initially was elevated


- as per patient's daughter, who is a physician, patient's record indicate she 

has had issues to hyperthyroid in the past


- will restart methimazole 5mg TID


- may need thyroid ultrasound, thyroid scan as outpatient


- outpatient endocrinology input





Vitamin D deficiency


- started on Vitamin D supplementation, repeat Vitamin D levels in 3 months





DVT ppx


- Eliquis





FULL CODE





PT/OT/speech ordered














Clinical course obtained from chart review:





March 19, 2018


Presented on 3/19 with significant bilateral lower extremity edema, fevers, 

chills, abdominal distention, nausea.





March 20, 2018


Patient admitted on 3/20 due to meeting severe sepsis criteria. She had 

significant lactic acidosis, tachycardia; later developed hypotension, 

leukocytosis, and elevated procalcitonin.


She was started on broad spectrum antibiotics, initially to cover cellulitis.





Imaging suggested shock bowel, hypotensive bowel - initial concern of liver 

cirrhosis. Further testing suggested significant R heart failure with hepatic 

congestion. Edematous lower extremities likely from the biventricular heart 

failure noted. Echo was done to confirm this diagnosis. LVEF at the time noted 

to be 35-40%. Dilatation of R atrium and R ventricle noted as well. Late on 3/20

, patient developed A. Fib with RVR and transferred to the ICU.





March 21, 2018


Patient spontaneously converted to sinus rhythm after being given IV Lopressor 

x2 and Cardizem ggt, which had to be stopped due to low blood pressure.


Troponin elevation noted, likely type 2 demand ischemia from infection, 

arrhythmia, sepsis.


Patient was started on IV heparin.





Patient was found to be positive for C. Diff Infection; CT suggested colitis. 

NGT was placed in the ICU and Vanco solution was given to treat this.


Blood cultures were positive at this time for gram negative organism.





Patient's mental status acutely worsened. Neurology was consulted and an MRI 

and EEG were performed. Attributed mental status change to metabolic 

encephalopathy.





Kidney function also acutely worsened; possibly from IV contrast from imaging 

done prior.





March 22, 2018


Nephrology consulted due to acute kidney injury and acute tubular necrosis. 

Consideration made for dialysis; though this was never required.


ID consulted - E. coli septicemia noted on blood cultures x2, repeat cultures 

were ordered.


Nephrology consulted - metabolic acidosis - possibly from sepsis and BERNABE.





March 23, 2018


General surgery consulted regarding C. Diff colitis, conservative management 

decided.


Patient at this time was going in and out of A. fib; initially started on IV 

Cardizem, but due to post-conversion pauses, this was stopped.


Was started on bicarb drip due to acidosis.


ID following for UTI, C. Diff colitis, E. coli septicemia.





March 24, 2018


Clinical improvement made on March 24 mentally.


Hypertension was still an issue and cardiology worked on getting this down by 

increasing b-blocker dose and adding hydralazine.





March 25, 2018


continuing PO Vancomycin and IV Rocephin


IV heparin stopped at this time. Eliquis had been started.


clinical picture continued to improve as per records.





March 26, 2018


Repeat TTE was done; improving biventricular heart failure noted.


Hypertensive Urgency still an issue on this day; cardiology adjusted meds to 

add nitropaste and Hydralazine, as well as an increase in Lopressor dose.
Subjective


Date of Service:


Mar 23, 2018.


Subjective


Pt evaluation today including:  conversation w/ patient, physical exam, chart 

review, lab review


pt seen in followup, remains confused. unable to answer questions but does say 

yes to abd pain. afebrile. remains on ctx for E. coli bsi, repeat cultures 

pending. urine culture negative. also on po vanco and clark for + c diff. remains 

with ngt. Repeat ct scan done this am, no free air noted, no increased bowel 

thickening noted, surgery eval pending. WBC continues to increase, 22 today. 

creat remains elevated as well. uto ros from patient due to confusion





Problem List


Medical Problems:


(1) Anasarca


Status: Acute  





(2) Atrial fibrillation with RVR


Status: Acute  





(3) C. difficile diarrhea


Status: Acute  





(4) Elevated troponin


Status: Acute  





(5) Hyperthyroidism


Status: Acute  





(6) Hypotension


Status: Acute  





(7) Left leg cellulitis


Status: Acute  





(8) Left leg pain


Status: Acute  





(9) Right-sided heart failure


Status: Acute  





(10) Sepsis


Status: Acute  











Objective


Vital Signs











  Date Time  Temp Pulse Resp B/P (MAP) Pulse Ox O2 Delivery O2 Flow Rate FiO2


 


3/23/18 06:00  80 20 184/95 (124) 97 Oxymask 5.0 


 


3/23/18 04:00 36.5 77 14 164/79 (107) 95 Oxymask 5.0 


 


3/23/18 04:00      Oxymask 5.0 


 


3/23/18 02:00  80 22 192/109 (136) 95 Oxymask 4.0 


 


3/23/18 00:01 36.4 73 18 174/99 (124) 95 Oxymask 4.0 


 


3/22/18 23:59      Oxymask 4.0 


 


3/22/18 22:00  79 17 115/84 (94) 97 Oxymask 4.0 


 


3/22/18 20:00 36.5 70 19 153/89 (110) 94 Oxymask 4.0 


 


3/22/18 20:00     94 Oxymask 4.0 


 


3/22/18 18:00  71 26 161/95 (117) 95 Nasal Cannula 6.0 


 


3/22/18 16:00 37.1 75 20 186/96 (126) 92 Nasal Cannula 2.0 


 


3/22/18 16:00     95 Nasal Cannula 2.0 


 


3/22/18 14:00  73 20 149/99 (116) 97 Nasal Cannula 2.0 


 


3/22/18 12:00  73 22 134/66 (88) 96 Nasal Cannula 2.0 


 


3/22/18 12:00     95 Room Air  











Physical Exam


General Appearance:  + moderate distress


Eyes:  normal inspection


Neck:  supple


Respiratory/Chest:  + decreased breath sounds


Cardiovascular:  no edema, no murmur


Abdomen:  + distended, + tenderness, + pertinent finding (firm)


Extremities:  non-tender, no pedal edema


Neurologic/Psychiatric:  + pertinent finding (confused, agitiated)


Skin:  normal color





Laboratory Results











Item Value  Date Time


 


Blood Culture - Final Complete 3/19/18 2250





Blood Escherichia Coli 


 


Blood Culture - Final Complete 3/19/18 2245





Blood Escherichia Coli 


 


C.difficile Toxin B Gene (PCR) - Final Complete 3/20/18 2245





Stool Positive for C. difficile toxin B gene 


 


Urine Culture - Final Complete 3/21/18 0330





Urine , Clean Catch NO GROWTH - LESS THAN 1,000 COLONIES/ML 











Last 24 Hours








Test


  3/22/18


15:25 3/22/18


16:00 3/22/18


16:01 3/22/18


21:43


 


Bedside Glucose 65 mg/dl    96 mg/dl 


 


Urine Random Sodium  16 mEq/L   


 


Blood Gas Sample Site   L Radial  


 


Bedside Blood Gas pH (LAB)   7.27  


 


Bedside Blood Gas pCO2 (LAB)   28 mmHg  


 


Bedside Blood Gas pO2 (LAB)   61 mmHg  


 


Bedside Blood Gas HCO3 (LAB)   13 meq/L  


 


Bedside Blood Gas Total CO2   14 mEq/l  


 


Bedside Blood Gas Base Excess


(LAB) 


  


  -14.0 meq/L 


  


 


 


Bedside Blood Gas O2


Saturation 


  


  88.0 % 


  


 


 


Samm Test   Pass  


 


Oxygen Delivery Device   Cannula  


 


Test


  3/23/18


05:32 3/23/18


05:36 3/23/18


10:47 


 


 


Bedside Glucose 105 mg/dl    


 


White Blood Count  22.95 K/uL   


 


Red Blood Count  3.96 M/uL   


 


Hemoglobin  11.2 g/dL   


 


Hematocrit  32.7 %   


 


Mean Corpuscular Volume  82.6 fL   


 


Mean Corpuscular Hemoglobin  28.3 pg   


 


Mean Corpuscular Hemoglobin


Concent 


  34.3 g/dl 


  


  


 


 


RDW Standard Deviation  41.6 fL   


 


RDW Coefficient of Variation  13.7 %   


 


Platelet Count  209 K/uL   


 


Mean Platelet Volume  11.9 fL   


 


Nucleated RBC Absolute Count


(auto) 


  0.08 K/uL 


  


  


 


 


Nucleated Red Blood Cells %  0.4 %   


 


Prothrombin Time  13.0 SECONDS   


 


Prothromb Time International


Ratio 


  1.2 


  


  


 


 


Activated Partial


Thromboplast Time 


  41.7 SECONDS 


  


  


 


 


Partial Thromboplastin Ratio  1.6   


 


Arterial Blood pH  7.31   


 


Arterial Blood Partial


Pressure CO2 


  33 mmHg 


  


  


 


 


Arterial Blood Partial


Pressure O2 


  76 mm/Hg 


  


  


 


 


Arterial Blood HCO3  16 mmol/L   


 


Arterial Blood Oxygen


Saturation 


  93.4 % 


  


  


 


 


Arterial Blood Base Excess  -9.4 mEq/L   


 


Arterial Blood Gas Delivery  5L   


 


Samm Test  POS   


 


Sodium Level  134 mmol/L   


 


Potassium Level  4.5 mmol/L   


 


Chloride Level  106 mmol/L   


 


Carbon Dioxide Level  16 mmol/L   


 


Anion Gap  12.0 mmol/L   


 


Blood Urea Nitrogen  73 mg/dl   


 


Creatinine  2.62 mg/dl   


 


Est Creatinine Clear Calc


Drug Dose 


  19.7 ml/min 


  


  


 


 


Estimated GFR (


American) 


  22.1 


  


  


 


 


Estimated GFR (Non-


American 


  19.1 


  


  


 


 


BUN/Creatinine Ratio  27.7   


 


Random Glucose  101 mg/dl   


 


Calcium Level  8.8 mg/dl   


 


Total Bilirubin  1.0 mg/dl   


 


Aspartate Amino Transf


(AST/SGOT) 


  44 U/L 


  


  


 


 


Alanine Aminotransferase


(ALT/SGPT) 


  43 U/L 


  


  


 


 


Alkaline Phosphatase  96 U/L   


 


Troponin I  0.742 ng/ml   


 


Total Protein  6.8 gm/dl   


 


Albumin  3.3 gm/dl   


 


Globulin  3.5 gm/dl   


 


Albumin/Globulin Ratio  0.9   


 


Procalcitonin  93.42 ng/ml   











Assessment and Plan





(1) C. difficile diarrhea


Assessment & Plan:  will continue on current therapy and follow repeat 

cultures. Suspect E. coli from GI translocation, no other clear source. Await 

surgical eval.
Subjective


Date of Service:


Mar 25, 2018.


Subjective


afebrile, tolerating abx. repeat blood cultures negative. wbc much improved.





Problem List


Medical Problems:


(1) Anasarca


Status: Acute  





(2) Atrial fibrillation with RVR


Status: Acute  





(3) C. difficile diarrhea


Status: Acute  





(4) Elevated troponin


Status: Acute  





(5) Hyperthyroidism


Status: Acute  





(6) Hypotension


Status: Acute  





(7) Left leg cellulitis


Status: Acute  





(8) Left leg pain


Status: Acute  





(9) Right-sided heart failure


Status: Acute  





(10) Sepsis


Status: Acute  











Objective


Vital Signs











  Date Time  Temp Pulse Resp B/P (MAP) Pulse Ox O2 Delivery O2 Flow Rate FiO2


 


3/25/18 06:47  120  145/111    


 


3/25/18 06:00  120 23 145/111 (122) 97   


 


3/25/18 04:07      Nasal Cannula 3.0 


 


3/25/18 04:00  86 22 177/94 (121) 97   


 


3/25/18 02:00  89 27 176/90 (118) 96   


 


3/25/18 00:32      Nasal Cannula 3.0 


 


3/25/18 00:29  84  167/94    


 


3/25/18 00:01  86 26 167/94 (118) 94 Nasal Cannula 3.0 


 


3/24/18 22:00  84 23 153/78 (103) 96 Nasal Cannula 4.0 


 


3/24/18 20:58      Nasal Cannula 3.0 


 


3/24/18 19:31 36.4       


 


3/24/18 18:01 36.8 95 23 169/76 (107) 95   


 


3/24/18 17:31  97 28 163/75 (104) 95   


 


3/24/18 17:01  93 29 162/74 (103) 96   


 


3/24/18 16:44  140  180/97    


 


3/24/18 16:31  85 31 180/97 (124) 98   


 


3/24/18 16:31  85 31 180/97 (124) 98   


 


3/24/18 16:30  83 25 183/97 (125) 97   


 


3/24/18 16:30  83 25 183/97 (125) 97   


 


3/24/18 16:01  83 22 183/108 (133) 95   


 


3/24/18 16:00     96 Nasal Cannula 3.0 


 


3/24/18 15:01  84 22 163/87 (112) 96   


 


3/24/18 14:31  85 18 156/80 (105) 96   


 


3/24/18 14:01 36.9 88 29 155/79 (104) 95  3.0 


 


3/24/18 14:01  88 29 155/79 (104) 95   


 


3/24/18 13:31  85 18 162/82 (108) 96  3.0 


 


3/24/18 13:01  82 16 157/82 (107) 96  3.0 


 


3/24/18 12:31  83 20 174/99 (124) 97  3.0 


 


3/24/18 12:14  81  163/84    


 


3/24/18 12:01 36.7 80 21 163/84 (110) 96   


 


3/24/18 12:00     96 Nasal Cannula 3.0 


 


3/24/18 11:31  79 16 158/74 (102) 95   


 


3/24/18 11:01  80 16 150/78 (102) 96   


 


3/24/18 10:31  96 12 164/81 (108) 95 Nasal Cannula 3.0 


 


3/24/18 10:30  95 5  95   


 


3/24/18 10:30  95 5  95   


 


3/24/18 10:01  98 19 171/83 (112) 96 Nasal Cannula 3.0 


 


3/24/18 10:00  98 12  95   


 


3/24/18 09:31  100 22 167/80 (109) 95 Nasal Cannula 3.0 


 


3/24/18 09:30  101 22  95 Nasal Cannula 3.0 


 


3/24/18 09:00  101 20 167/80 (109) 94 Nasal Cannula 3.0 


 


3/24/18 09:00  101 20  94   


 


3/24/18 08:31  93 22 188/96 (126) 98 Oxymask  


 


3/24/18 08:00  93 21  96 Oxymask  


 


3/24/18 08:00     96 Oxymask 5.0 


 


3/24/18 07:31  92 20 178/97 (124) 96 Oxymask  











Laboratory Results











Item Value  Date Time


 


Blood Culture - Final Complete 3/19/18 2245





Blood Escherichia Coli 


 


Blood Culture - Final Complete 3/19/18 2250





Blood Escherichia Coli 


 


C.difficile Toxin B Gene (PCR) - Final Complete 3/20/18 2245





Stool Positive for C. difficile toxin B gene 


 


Blood Culture - Preliminary Resulted 3/22/18 1428





Blood NO GROWTH TO DATE. 


 


Blood Culture - Preliminary Resulted 3/22/18 1435





Blood NO GROWTH TO DATE. 











Last 24 Hours








Test


  3/24/18


21:22 3/25/18


05:46


 


Sodium Level 144 mmol/L  144 mmol/L 


 


Potassium Level 3.4 mmol/L  3.1 mmol/L 


 


Chloride Level 109 mmol/L  108 mmol/L 


 


Carbon Dioxide Level 29 mmol/L  27 mmol/L 


 


Anion Gap 6.0 mmol/L  9.0 mmol/L 


 


Blood Urea Nitrogen 34 mg/dl  28 mg/dl 


 


Creatinine 0.86 mg/dl  0.77 mg/dl 


 


Est Creatinine Clear Calc


Drug Dose 58.4 ml/min 


  64.8 ml/min 


 


 


Estimated GFR (


American) 85.1 


  97.3 


 


 


Estimated GFR (Non-


American 73.4 


  83.9 


 


 


BUN/Creatinine Ratio 39.4  36.9 


 


Random Glucose 151 mg/dl  156 mg/dl 


 


Calcium Level 8.1 mg/dl  8.5 mg/dl 


 


Phosphorus Level 2.7 mg/dl  2.6 mg/dl 


 


Magnesium Level 1.8 mg/dl  2.1 mg/dl 


 


White Blood Count  17.67 K/uL 


 


Red Blood Count  4.12 M/uL 


 


Hemoglobin  11.5 g/dL 


 


Hematocrit  33.5 % 


 


Mean Corpuscular Volume  81.3 fL 


 


Mean Corpuscular Hemoglobin  27.9 pg 


 


Mean Corpuscular Hemoglobin


Concent 


  34.3 g/dl 


 


 


RDW Standard Deviation  40.5 fL 


 


RDW Coefficient of Variation  13.7 % 


 


Platelet Count  296 K/uL 


 


Mean Platelet Volume  11.3 fL 


 


Nucleated RBC Absolute Count


(auto) 


  0.06 K/uL 


 


 


Nucleated Red Blood Cells %  0.3 % 


 


Activated Partial


Thromboplast Time 


  50.7 SECONDS 


 


 


Partial Thromboplastin Ratio  2.0 











Assessment and Plan





(1) C. difficile diarrhea


Assessment & Plan:  continue po vanco. continue ctx x 14 days from first 

negative cultures. would give 28 days po vanco.
Subjective


Date of Service:


Mar 26, 2018.


Subjective


Pt evaluation today including:  conversation w/ patient, physical exam, chart 

review, lab review


transfered from ICU. much more awake and appropriate. remains with tube feeds 

and po vanco. c/o abd pain. denies f/c. also on ctx for E. coli bsi. repeat 

blood cultures negative to date. wbc improved, 17 today. all remaining ros 

reviewed and are negative.





Problem List


Medical Problems:


(1) Anasarca


Status: Acute  





(2) Atrial fibrillation with RVR


Status: Acute  





(3) C. difficile diarrhea


Status: Acute  





(4) Elevated troponin


Status: Acute  





(5) Hyperthyroidism


Status: Acute  





(6) Hypotension


Status: Acute  





(7) Left leg cellulitis


Status: Acute  





(8) Left leg pain


Status: Acute  





(9) Right-sided heart failure


Status: Acute  





(10) Sepsis


Status: Acute  











Objective


Vital Signs











  Date Time  Temp Pulse Resp B/P (MAP) Pulse Ox O2 Delivery O2 Flow Rate FiO2


 


3/26/18 08:00      Nasal Cannula 2.0 


 


3/26/18 07:00 36.4 84 28 174/114 (134) 95 Nasal Cannula 2.0 


 


3/26/18 05:51  87  171/96    


 


3/26/18 04:53    167/99 (121)    


 


3/26/18 04:05      Nasal Cannula 2.0 


 


3/26/18 02:50 36.7 83 26 205/127 (153) 98   





    201/145 (163)    


 


3/26/18 00:06      Nasal Cannula 2.0 


 


3/25/18 23:58 36.7 69 26 158/93 (114) 91   


 


3/25/18 20:24      Nasal Cannula 2.0 


 


3/25/18 19:04 36.8 74 22 169/95 (119) 96 Nasal Cannula 2.5 


 


3/25/18 16:02 36.7 72 26 126/79 (95) 97 Nasal Cannula 2.0 


 


3/25/18 16:00      Nasal Cannula 2.0 


 


3/25/18 15:01  70 25 150/79 (102) 95 Nasal Cannula 2.0 


 


3/25/18 14:01  68 23 136/81 (99) 96 Nasal Cannula 2.0 


 


3/25/18 13:01  67 15 136/78 (97) 96   


 


3/25/18 12:31  72 19 142/83 (102) 95   


 


3/25/18 12:02 36.8 74 35 159/88 (111) 100 Nasal Cannula 2.0 


 


3/25/18 12:00      Nasal Cannula 2.0 


 


3/25/18 11:31  74 30 150/83 (105) 95   











Physical Exam


General Appearance:  WD/WN, no apparent distress


Eyes:  normal inspection, EOMI


Neck:  supple


Respiratory/Chest:  + decreased breath sounds


Cardiovascular:  regular rate, rhythm, no edema


Abdomen:  soft, + pertinent finding (less distened, softer)


Extremities:  non-tender, no pedal edema


Neurologic/Psychiatric:  alert


Skin:  normal color





Laboratory Results











Item Value  Date Time


 


Blood Culture - Preliminary Resulted 3/22/18 1435





Blood NO GROWTH TO DATE. 


 


Blood Culture - Preliminary Resulted 3/22/18 1428





Blood NO GROWTH TO DATE. 


 


C.difficile Toxin B Gene (PCR) - Final Complete 3/20/18 2245





Stool Positive for C. difficile toxin B gene 


 


Blood Culture - Final Complete 3/19/18 2250





Blood Escherichia Coli 


 


Blood Culture - Final Complete 3/19/18 2245





Blood Escherichia Coli 











Last 24 Hours








Test


  3/25/18


11:43 3/26/18


05:19 3/26/18


05:23


 


Bedside Glucose 187 mg/dl   


 


Activated Partial


Thromboplast Time 


  35.4 SECONDS 


  


 


 


Partial Thromboplastin Ratio  1.4  


 


White Blood Count   17.75 K/uL 


 


Red Blood Count   4.24 M/uL 


 


Hemoglobin   11.8 g/dL 


 


Hematocrit   35.3 % 


 


Mean Corpuscular Volume   83.3 fL 


 


Mean Corpuscular Hemoglobin   27.8 pg 


 


Mean Corpuscular Hemoglobin


Concent 


  


  33.4 g/dl 


 


 


RDW Standard Deviation   41.9 fL 


 


RDW Coefficient of Variation   13.9 % 


 


Platelet Count   364 K/uL 


 


Mean Platelet Volume   11.2 fL 


 


Nucleated RBC Absolute Count


(auto) 


  


  0.07 K/uL 


 


 


Nucleated Red Blood Cells %   0.4 % 


 


Sodium Level   142 mmol/L 


 


Potassium Level   3.6 mmol/L 


 


Chloride Level   109 mmol/L 


 


Carbon Dioxide Level   27 mmol/L 


 


Anion Gap   7.0 mmol/L 


 


Blood Urea Nitrogen   32 mg/dl 


 


Creatinine   0.81 mg/dl 


 


Est Creatinine Clear Calc


Drug Dose 


  


  61.0 ml/min 


 


 


Estimated GFR (


American) 


  


  91.5 


 


 


Estimated GFR (Non-


American 


  


  78.9 


 


 


BUN/Creatinine Ratio   39.8 


 


Random Glucose   242 mg/dl 


 


Calcium Level   8.4 mg/dl 











Assessment and Plan





(1) C. difficile diarrhea


Assessment & Plan:  continue po vanco. continue ctx x 14 days from first 

negative cultures. would give 28 days po vanco.
Subjective


Date of Service:


Mar 27, 2018.


Subjective


Pt evaluation today including:  conversation w/ patient, physical exam, lab 

review, review of studies, review of inpatient medication list


Saw/examined the patient in room 201


She is lethargic and tired, mumbling to answer questions.


As per nursing, she received Dilaudid this morning.


NGT still in place.


Unable to obtain ROS.





Problem List


Medical Problems:


(1) Anasarca


Status: Acute  





(2) Atrial fibrillation with RVR


Status: Acute  





(3) C. difficile diarrhea


Status: Acute  





(4) Elevated troponin


Status: Acute  





(5) Hyperthyroidism


Status: Acute  





(6) Hypotension


Status: Acute  





(7) Left leg cellulitis


Status: Acute  





(8) Left leg pain


Status: Acute  





(9) Right-sided heart failure


Status: Acute  





(10) Sepsis


Status: Acute  








Medications





Current Inpatient Medications








 Medications


  (Trade)  Dose


 Ordered  Sig/Wilman


 Route  Start Time


 Stop Time Status Last Admin


Dose Admin


 


 Acetaminophen


  (Tylenol Tab)  325 mg  Q6H  PRN


 PO  3/20/18 03:15


 4/19/18 03:14  3/20/18 23:29


325 MG


 


 Prochlorperazine


 Edisylate 5 mg/


 Syringe  5 ml @ 5


 mls/min  Q6H  PRN


 IV  3/20/18 03:15


 4/19/18 03:14  3/27/18 07:57


5 MLS/MIN


 


 Metronidazole 500


 mg/Prmx  100 ml @ 


 100 mls/hr  Q8H


 IV  3/20/18 14:00


 3/30/18 13:59  3/27/18 06:05


100 MLS/HR


 


 Pantoprazole


 Sodium 40 mg/


 Syringe  10 ml @ 5


 mls/min  DAILY@11


 IV  3/21/18 11:00


 4/20/18 10:59  3/27/18 08:01


5 MLS/MIN


 


 Raspberry


  (Raspberry Syrup


 5ml Cup)  5 ml  QID


 PO  3/21/18 09:30


 4/4/18 09:29  3/27/18 08:02


5 ML


 


 Vancomycin HCl


  (Vancomycin Oral


 Soln)  500 mg  QID


 PO  3/22/18 17:00


 4/5/18 16:59  3/27/18 08:04


500 MG


 


 Hydromorphone HCl


  (Dilaudid Inj)  0.5 mg  Q3H  PRN


 IV  3/23/18 10:45


 4/6/18 10:44  3/27/18 07:56


0.5 MG


 


 Ceftriaxone


 Sodium 2 gm/


 Dextrose  50 ml @ 


 100 mls/hr  Q24H


 IV  3/23/18 18:00


 4/1/18 17:59  3/26/18 17:39


100 MLS/HR


 


 Hydralazine HCl


  (HydrALAZINE INJ)  10 mg  Q4  PRN


 IV.  3/23/18 17:15


 4/22/18 17:14  3/27/18 03:15


10 MG


 


 Enteral


 Nutritional


 Formula


  (Peptamen 1.5)  1,000 ml  UD


 NG  3/24/18 11:45


 4/23/18 11:44  3/27/18 08:09


1,000 ML


 


 Enteral


 Nutritional


 Formula


  (Prosource No


 Carb)  30 ml  DAILY@0900


 NG  3/24/18 12:30


 4/23/18 12:29  3/27/18 08:03


30 ML


 


 Nystatin


  (Mycostatin Susp)  5 ml  QID


 PO  3/24/18 19:30


 3/31/18 19:29  3/27/18 08:02


5 ML


 


 Dextrose/Sodium


 Chloride  1,000 ml @ 


 75 mls/hr  [id number]


 IV  3/24/18 18:15


 4/23/18 18:14  3/26/18 23:24


75 MLS/HR


 


 Metoprolol


 Tartrate


  (Lopressor Iv)  5 mg  Q6H  PRN


 IV  3/24/18 18:45


 4/23/18 18:44  3/26/18 05:51


5 MG


 


 Ondansetron HCl 6


 mg/Dextrose  53 ml @ 


 200 mls/hr  Q6H  PRN


 IV  3/25/18 00:30


 4/24/18 00:29  3/25/18 07:48


200 MLS/HR


 


 Potassium Chloride


  (Kelli Ciel Elix)  40 meq  BID


 PO  3/25/18 21:00


 4/24/18 20:59 Future Hold 3/27/18 08:00


40 MEQ


 


 Apixaban


  (Eliquis Tab)  5 mg  BID


 PO  3/25/18 16:00


 4/24/18 15:59  3/27/18 08:00


5 MG


 


 Metoprolol


 Tartrate


  (Lopressor Tab)  50 mg  Q6


 PO  3/26/18 12:00


 4/23/18 20:59  3/27/18 06:05


50 MG


 


 Nitroglycerin


  (Nitroglycerin


 2% Oint)  1 inch  Q6H


 EXT  3/26/18 10:00


 4/25/18 09:59  3/27/18 03:15


1 INCH


 


 Hydralazine HCl


  (Apresoline Tab)  12.5 mg  TID


 PO  3/26/18 14:00


 4/25/18 13:59  3/27/18 08:03


12.5 MG


 


 Miscellaneous


 Information


  (Consult


 Glycemic


 Management


 Pharmacy)  1 ea  NOW  STAT


 N/A  3/27/18 09:09


 3/27/18 09:10 UNV  


 











Objective


Vital Signs











  Date Time  Temp Pulse Resp B/P (MAP) Pulse Ox O2 Delivery O2 Flow Rate FiO2


 


3/27/18 07:26 36.6 63 28 182/106 (131) 94 Nasal Cannula 2.0 


 


3/27/18 04:08  74  168/95 (119)    


 


3/27/18 04:00      Nasal Cannula 1.0 


 


3/27/18 03:44  70  179/100 (126)    


 


3/27/18 03:04 36.6 67 27 175/101 (125) 94 Nasal Cannula 1.0 


 


3/26/18 23:59     96 Nasal Cannula 1.0 


 


3/26/18 23:45 36.3 68 28 176/94 (121) 95 Nasal Cannula 1.0 


 


3/26/18 20:00      Nasal Cannula 1.0 


 


3/26/18 19:48 36.6 66 24 149/85 (106) 95  2.0 


 


3/26/18 16:00      Nasal Cannula 2.0 


 


3/26/18 15:30 36.7 66 22 131/82 (98) 96  2.0 


 


3/26/18 12:00      Nasal Cannula 2.0 


 


3/26/18 12:00 36.4 72 24  95 Nasal Cannula 2.0 


 


3/26/18 11:38    140/83 (102)    











Physical Exam


General Appearance:  + mild distress (+/- shallow respirations at times), + thin

, + pertinent finding (lethargic, mumbling answers)


Respiratory/Chest:  no respiratory distress, no accessory muscle use, + 

decreased breath sounds


Cardiovascular:  regular rate, rhythm, no murmur


Abdomen:  + pertinent finding (somewhat firm, normoactive BS)


Extremities:  + swelling (+pitting hand swelling b/l, no edema in the b/l LE), 

+ pertinent finding


Neurologic/Psychiatric:  alert, + disoriented, + pertinent finding





Laboratory Results





Last 24 Hours








Test


  3/27/18


06:58


 


White Blood Count 19.60 K/uL 


 


Red Blood Count 4.23 M/uL 


 


Hemoglobin 11.7 g/dL 


 


Hematocrit 35.9 % 


 


Mean Corpuscular Volume 84.9 fL 


 


Mean Corpuscular Hemoglobin 27.7 pg 


 


Mean Corpuscular Hemoglobin


Concent 32.6 g/dl 


 


 


RDW Standard Deviation 43.7 fL 


 


RDW Coefficient of Variation 14.2 % 


 


Platelet Count 444 K/uL 


 


Mean Platelet Volume 10.9 fL 


 


Activated Partial


Thromboplast Time 35.5 SECONDS 


 


 


Partial Thromboplastin Ratio 1.4 


 


Sodium Level 141 mmol/L 


 


Potassium Level 5.0 mmol/L 


 


Chloride Level 109 mmol/L 


 


Carbon Dioxide Level 26 mmol/L 


 


Anion Gap 6.0 mmol/L 


 


Blood Urea Nitrogen 34 mg/dl 


 


Creatinine 0.80 mg/dl 


 


Est Creatinine Clear Calc


Drug Dose 64.6 ml/min 


 


 


Estimated GFR (


American) 92.9 


 


 


Estimated GFR (Non-


American 80.1 


 


 


BUN/Creatinine Ratio 42.1 


 


Random Glucose 292 mg/dl 


 


Calcium Level 8.6 mg/dl 


 


Phosphorus Level 2.6 mg/dl 


 


Magnesium Level 2.0 mg/dl 











Assessment and Plan


This is a 62 year old female with no past medical history prior to arrival.





Metabolic Encephalopathy


- patient with confusion, disorientation


- possibly related to Dilaudid use, also has had sepsis, hypotension episodes, 

BERNABE earlier in the admission


- appreciate neurology input on this matter, no need for further imaging


- Brain MRI, Head CT already performed, EEG reviewed by neurology


- slow improvement


- may need Ativan for significant anxiety as well





Severe Sepsis, E. coli Septicemia


Severe C. Diff Colitis


- blood cultures positive for E. coli


- C. Diff positive, colitis noted on imaging


- started on IV Flagyl initially, converted to PO Vanco


- appreciate ID input


- will need PO Vanco for 28 days total


- will need Rocephin for 14 days since the negative blood culture on 3/22


- appreciate general surgery input regarding C. Diff - no surgical intervention 

at this time


- will monitor abdominal firmness/distention - if no improvement, repeat abd/

pelvis CT can be done





Paroxysmal A. Fib with RVR


- intermittent A. fib and rapid ventricular response; likely from sepsis


- currently in NSR


- appreciate cardiology input - continue Lopressor for rate control


- Eliquis for anticoagulation


- had required IV Lopressor and Cardizem ggt - no longer should receive 

Cardizem due to pauses noted earlier in the admission





Biventricular Heart Failure


- patient presented with worsening lower extremity edema, hepatic congestion


- echo initially suggested significant R ventricular systolic function and 

decreased LVEF


- started on b-blocker, treated for infection, etc.


- repeat echo was performed on 3/26 with improvement of both ventricular 

function


- further management as per cardiology





Type 2 Demand Ischemia


- troponin elevation when she first came in - likely secondary to infection, 

anemia, etc.


- initially was on IV heparin, now discontinued


- currently on Eliquis for A. fib episodes


- continue b-blocker


- treating underlying infection





Hypertensive Urgency


- BP yesterday >200/100


- appreciate cardiology input


- started on Lopressor 50mg q6, Hydralazine and topical nitrates added


- anxiety may also be playing a part in elevated blood pressure


- will add low dose Ativan PRN





Hyperglycemia


- likely due to dextrose in tube feeds


- consult speech


- consult pharmacy glycemic control





Acute Kidney Injury


- resolved now; initially due to acute tubular necrosis, possible medication or 

IV contrast induced


- was given IVFs due to sepsis


- currently would like to maintain fluid balance





Hepatic Congestion


- from biventricular heart failure


- monitor for fluid overload





Anemia - improving


- likely iron deficiency


- H/H stable





Thrombocytopenia - resolved





Abnormal Thyroid Function Test


- possibly due to acute illness, severe sepsis, etc.


- initially on methimazole; now d/c'd


- will need outpatient TSH, Free T4, T3; may need thyroid ultrasound and 

further testing


- outpatient endocrinology input





DVT ppx


- Eliquis





FULL CODE





PT/OT/speech ordered














Clinical course obtained from chart review:





March 19, 2018


Presented on 3/19 with significant bilateral lower extremity edema, fevers, 

chills, abdominal distention, nausea.





March 20, 2018


Patient admitted on 3/20 due to meeting severe sepsis criteria. She had 

significant lactic acidosis, tachycardia; later developed hypotension, 

leukocytosis, and elevated procalcitonin.


She was started on broad spectrum antibiotics, initially to cover cellulitis.





Imaging suggested shock bowel, hypotensive bowel - initial concern of liver 

cirrhosis. Further testing suggested significant R heart failure with hepatic 

congestion. Edematous lower extremities likely from the biventricular heart 

failure noted. Echo was done to confirm this diagnosis. LVEF at the time noted 

to be 35-40%. Dilatation of R atrium and R ventricle noted as well. Late on 3/20

, patient developed A. Fib with RVR and transferred to the ICU.





March 21, 2018


Patient spontaneously converted to sinus rhythm after being given IV Lopressor 

x2 and Cardizem ggt, which had to be stopped due to low blood pressure.


Troponin elevation noted, likely type 2 demand ischemia from infection, 

arrhythmia, sepsis.


Patient was started on IV heparin.





Patient was found to be positive for C. Diff Infection; CT suggested colitis. 

NGT was placed in the ICU and Vanco solution was given to treat this.


Blood cultures were positive at this time for gram negative organism.





Patient's mental status acutely worsened. Neurology was consulted and an MRI 

and EEG were performed. Attributed mental status change to metabolic 

encephalopathy.





Kidney function also acutely worsened; possibly from IV contrast from imaging 

done prior.





March 22, 2018


Nephrology consulted due to acute kidney injury and acute tubular necrosis. 

Consideration made for dialysis; though this was never required.


ID consulted - E. coli septicemia noted on blood cultures x2, repeat cultures 

were ordered.


Nephrology consulted - metabolic acidosis - possibly from sepsis and BERNABE.





March 23, 2018


General surgery consulted regarding C. Diff colitis, conservative management 

decided.


Patient at this time was going in and out of A. fib; initially started on IV 

Cardizem, but due to post-conversion pauses, this was stopped.


Was started on bicarb drip due to acidosis.


ID following for UTI, C. Diff colitis, E. coli septicemia.





March 24, 2018


Clinical improvement made on March 24 mentally.


Hypertension was still an issue and cardiology worked on getting this down by 

increasing b-blocker dose and adding hydralazine.





March 25, 2018


continuing PO Vancomycin and IV Rocephin


IV heparin stopped at this time. Eliquis had been started.


clinical picture continued to improve as per records.





March 26, 2018


Repeat TTE was done; improving biventricular heart failure noted.


Hypertensive Urgency still an issue on this day; cardiology adjusted meds to 

add nitropaste and Hydralazine, as well as an increase in Lopressor dose.
Subjective


Date of Service:


Mar 28, 2018.


Subjective


Pt evaluation today including:  conversation w/ patient, physical exam, lab 

review, review of studies, review of inpatient medication list


Saw/examined the patient in room 201


Yesterday, she pulled the NG tube out


She is much more alert and oriented today, able to have a conversation


States she wants some food; I let her know about her failing the swallow 

evaluation yesterday


She does not have any complaints at this time





Problem List


Medical Problems:


(1) Anasarca


Status: Acute  





(2) Atrial fibrillation with RVR


Status: Acute  





(3) C. difficile diarrhea


Status: Acute  





(4) Elevated troponin


Status: Acute  





(5) Hyperthyroidism


Status: Acute  





(6) Hypotension


Status: Acute  





(7) Left leg cellulitis


Status: Acute  





(8) Left leg pain


Status: Acute  





(9) Right-sided heart failure


Status: Acute  





(10) Sepsis


Status: Acute  











Review of Systems


Respiratory:  No shortness of breath


Cardiac:  No chest pain


Abdomen:  No pain, No nausea, No vomiting, No diarrhea


Limited ROS due to mental status





Medications





Current Inpatient Medications








 Medications


  (Trade)  Dose


 Ordered  Sig/Wilman


 Route  Start Time


 Stop Time Status Last Admin


Dose Admin


 


 Acetaminophen


  (Tylenol Tab)  325 mg  Q6H  PRN


 PO  3/20/18 03:15


 4/19/18 03:14  3/20/18 23:29


325 MG


 


 Prochlorperazine


 Edisylate 5 mg/


 Syringe  5 ml @ 5


 mls/min  Q6H  PRN


 IV  3/20/18 03:15


 4/19/18 03:14  3/27/18 07:57


5 MLS/MIN


 


 Metronidazole 500


 mg/Prmx  100 ml @ 


 100 mls/hr  Q8H


 IV  3/20/18 14:00


 3/28/18 23:59  3/28/18 05:29


100 MLS/HR


 


 Pantoprazole


 Sodium 40 mg/


 Syringe  10 ml @ 5


 mls/min  DAILY@11


 IV  3/21/18 11:00


 4/20/18 10:59  3/27/18 08:01


5 MLS/MIN


 


 Raspberry


  (Raspberry Syrup


 5ml Cup)  5 ml  QID


 PO  3/21/18 09:30


 4/4/18 09:29  3/27/18 13:13


5 ML


 


 Vancomycin HCl


  (Vancomycin Oral


 Soln)  500 mg  QID


 PO  3/22/18 17:00


 4/5/18 16:59  3/27/18 13:42


500 MG


 


 Hydromorphone HCl


  (Dilaudid Inj)  0.5 mg  Q3H  PRN


 IV  3/23/18 10:45


 4/6/18 10:44  3/27/18 07:56


0.5 MG


 


 Ceftriaxone


 Sodium 2 gm/


 Dextrose  50 ml @ 


 100 mls/hr  Q24H


 IV  3/23/18 18:00


 4/1/18 17:59  3/27/18 17:24


100 MLS/HR


 


 Enteral


 Nutritional


 Formula


  (Peptamen 1.5)  1,000 ml  UD


 NG  3/24/18 11:45


 4/23/18 11:44  3/27/18 08:09


1,000 ML


 


 Enteral


 Nutritional


 Formula


  (Prosource No


 Carb)  30 ml  DAILY@0900


 NG  3/24/18 12:30


 4/23/18 12:29  3/27/18 08:03


30 ML


 


 Nystatin


  (Mycostatin Susp)  5 ml  QID


 PO  3/24/18 19:30


 3/31/18 19:29  3/27/18 13:13


5 ML


 


 Ondansetron HCl 6


 mg/Dextrose  53 ml @ 


 200 mls/hr  Q6H  PRN


 IV  3/25/18 00:30


 4/24/18 00:29  3/25/18 07:48


200 MLS/HR


 


 Potassium Chloride


  (Kelli Ciel Elix)  40 meq  BID


 PO  3/25/18 21:00


 4/24/18 20:59 Future Hold 3/27/18 08:00


40 MEQ


 


 Apixaban


  (Eliquis Tab)  5 mg  BID


 PO  3/25/18 16:00


 4/24/18 15:59 Future Hold 3/27/18 08:00


5 MG


 


 Metoprolol


 Tartrate


  (Lopressor Tab)  50 mg  Q6


 PO  3/26/18 12:00


 4/23/18 20:59 Future Hold 3/27/18 11:28


50 MG


 


 Nitroglycerin


  (Nitroglycerin


 2% Oint)  1 inch  Q6H


 EXT  3/26/18 10:00


 4/25/18 09:59  3/28/18 04:06


1 INCH


 


 Miscellaneous


 Information


  (Consult


 Glycemic


 Management


 Pharmacy)  1 ea  UD  PRN


 N/A  3/27/18 09:32


 4/26/18 09:31   


 


 


 Dextrose/Sodium


 Chloride  1,000 ml @ 


 75 mls/hr  [id number]


 IV  3/27/18 17:45


 4/26/18 17:44 Future Hold 3/27/18 17:50


75 MLS/HR


 


 Glucose


  (Glucose 40% Gel)  15-30


 GRAMS 15


 GRAMS...  UD  PRN


 PO  3/27/18 20:00


 4/26/18 19:59   


 


 


 Glucose


  (Glucose Chew


 Tab)  4-8


 Tablets 4


 Tabl...  UD  PRN


 PO  3/27/18 20:00


 4/26/18 19:59   


 


 


 Dextrose


  (Dextrose 50%


 50ML Syringe)  25-50ML OF


 50% DW IV


 FOR...  UD  PRN


 IV  3/27/18 20:00


 4/26/18 19:59   


 


 


 Glucagon


  (Glucagon Inj)  1 mg  UD  PRN


 SQ  3/27/18 20:00


 4/26/18 19:59   


 


 


 Metoprolol


 Tartrate


  (Lopressor Iv)  2.5 mg  Q6H


 IV.  3/28/18 00:00


 4/27/18 00:00  3/28/18 05:29


2.5 MG


 


 Heparin Sodium/


 Dextrose  500 ml @ 


 13 mls/hr  Q24H


 IV  3/27/18 23:30


 4/26/18 23:29  3/27/18 23:53


13 MLS/HR


 


 Acetaminophen 650


 mg/Empty Bag  65 ml @ 


 260 mls/hr  Q6H  PRN


 IV  3/27/18 23:45


 4/26/18 23:44   


 


 


 Dextrose  1,000 ml @ 


 40 mls/hr  Q24H


 IV  3/28/18 00:15


 4/27/18 00:14  3/28/18 00:21


40 MLS/HR


 


 Ipratropium


 Bromide


  (Atrovent 0.02%


 0.5MG/2.5ML Neb)  0.5 mg  Q4H  PRN


 INH  3/28/18 00:15


 4/27/18 00:14   


 


 


 Levalbuterol


  (Xopenex 1.25MG/


 0.5ML Neb)  1.25 mg  Q4H  PRN


 INH  3/28/18 00:15


 4/27/18 00:14   


 


 


 Insulin Aspart


  (novoLOG ASPART)  SLIDING


 SCALE  Q6


 SC  3/28/18 12:00


 4/27/18 10:59   


 











Objective


Vital Signs











  Date Time  Temp Pulse Resp B/P (MAP) Pulse Ox O2 Delivery O2 Flow Rate FiO2


 


3/28/18 07:34 36.9 78 18 154/79 (104) 95 Nasal Cannula  


 


3/28/18 05:29  81  172/94    


 


3/28/18 04:15  77  159/85 (109)    


 


3/28/18 04:00      Nasal Cannula 2.0 


 


3/28/18 03:00 36.9 75 20 179/112 (134) 99   


 


3/28/18 02:00  75 19 162/87 (112) 97 Nasal Cannula 2.0 


 


3/28/18 01:09  70  188/99 (128)    


 


3/28/18 00:25   29 212/111 (144) 94 Nasal Cannula 2.0 


 


3/28/18 00:13  75 22  100 Nasal Cannula 2.0 


 


3/27/18 23:59 36.9 74 32 194/117 (142) 98 Nasal Cannula 2.0 


 


3/27/18 23:59      Nasal Cannula 2.0 


 


3/27/18 23:45  86  216/122    


 


3/27/18 21:38  78  191/107 (135)    





    190/114 (139)    


 


3/27/18 20:00      Nasal Cannula 2.0 


 


3/27/18 19:40 36.4 68 22 175/95 (121) 95 Room Air  


 


3/27/18 16:18 36.4 63 22 172/93 (119) 100 Nasal Cannula 2.0 


 


3/27/18 16:00      Nasal Cannula 2.0 


 


3/27/18 12:00      Nasal Cannula 2.0 


 


3/27/18 12:00 36.6 62 22 146/87 (106) 98 Nasal Cannula 2.0 


 


3/27/18 08:47  60 22 146/84 (104) 95 Nasal Cannula 25.0 











Physical Exam


General Appearance:  + pertinent finding (acutely ill, improving)


Respiratory/Chest:  no respiratory distress, no accessory muscle use, + 

decreased breath sounds


Cardiovascular:  regular rate, rhythm, no edema, no murmur


Abdomen:  normal bowel sounds, non tender, soft


Extremities:  normal inspection, no pedal edema


Neurologic/Psychiatric:  alert, + disoriented, + pertinent finding (more 

oriented than yesterday, difficult to understand, possible slurring of speech?)





Laboratory Results





Last 24 Hours








Test


  3/27/18


10:03 3/27/18


13:27 3/27/18


16:20 3/27/18


19:46


 


Bedside Glucose 296 mg/dl  236 mg/dl  96 mg/dl  52 mg/dl 


 


Test


  3/27/18


20:23 3/27/18


23:02 3/27/18


23:42 3/28/18


02:25


 


Bedside Glucose 146 mg/dl   83 mg/dl  91 mg/dl 


 


White Blood Count  20.41 K/uL   


 


Red Blood Count  4.41 M/uL   


 


Hemoglobin  12.3 g/dL   


 


Hematocrit  37.0 %   


 


Mean Corpuscular Volume  83.9 fL   


 


Mean Corpuscular Hemoglobin  27.9 pg   


 


Mean Corpuscular Hemoglobin


Concent 


  33.2 g/dl 


  


  


 


 


Platelet Count  533 K/uL   


 


Mean Platelet Volume  10.3 fL   


 


Neutrophils (%) (Auto)  71.9 %   


 


Lymphocytes (%) (Auto)  14.8 %   


 


Monocytes (%) (Auto)  8.2 %   


 


Eosinophils (%) (Auto)  1.2 %   


 


Basophils (%) (Auto)  0.1 %   


 


Neutrophils # (Auto)  14.66 K/uL   


 


Lymphocytes # (Auto)  3.03 K/uL   


 


Monocytes # (Auto)  1.67 K/uL   


 


Eosinophils # (Auto)  0.25 K/uL   


 


Basophils # (Auto)  0.03 K/uL   


 


RDW Standard Deviation  43.4 fL   


 


RDW Coefficient of Variation  14.3 %   


 


Immature Granulocyte % (Auto)  3.8 %   


 


Immature Granulocyte # (Auto)  0.77 K/uL   


 


Prothrombin Time  14.7 SECONDS   


 


Prothromb Time International


Ratio 


  1.4 


  


  


 


 


Activated Partial


Thromboplast Time 


  36.4 SECONDS 


  


  


 


 


Partial Thromboplastin Ratio  1.4   


 


Sodium Level  143 mmol/L   


 


Potassium Level  4.5 mmol/L   


 


Chloride Level  111 mmol/L   


 


Carbon Dioxide Level  26 mmol/L   


 


Anion Gap  6.0 mmol/L   


 


Blood Urea Nitrogen  34 mg/dl   


 


Creatinine  0.78 mg/dl   


 


Est Creatinine Clear Calc


Drug Dose 


  66.2 ml/min 


  


  


 


 


Estimated GFR (


American) 


  95.8 


  


  


 


 


Estimated GFR (Non-


American 


  82.6 


  


  


 


 


BUN/Creatinine Ratio  43.6   


 


Random Glucose  104 mg/dl   


 


Estimated Average Glucose  140 mg/dl   


 


Hemoglobin A1c  6.5 %   


 


Calcium Level  9.0 mg/dl   


 


Magnesium Level  1.9 mg/dl   


 


Test


  3/28/18


03:59 3/28/18


06:26 


  


 


 


Bedside Glucose 90 mg/dl    


 


White Blood Count  23.96 K/uL   


 


Red Blood Count  4.38 M/uL   


 


Hemoglobin  12.2 g/dL   


 


Hematocrit  36.7 %   


 


Mean Corpuscular Volume  83.8 fL   


 


Mean Corpuscular Hemoglobin  27.9 pg   


 


Mean Corpuscular Hemoglobin


Concent 


  33.2 g/dl 


  


  


 


 


RDW Standard Deviation  42.5 fL   


 


RDW Coefficient of Variation  14.3 %   


 


Platelet Count  565 K/uL   


 


Mean Platelet Volume  10.6 fL   


 


Activated Partial


Thromboplast Time 


  68.9 SECONDS 


  


  


 


 


Partial Thromboplastin Ratio  2.7   











Assessment and Plan


This is a 62 year old female with no past medical history prior to arrival.





Metabolic Encephalopathy


Possible Subacute R Cerebellar Infarct


3/28


- Head CT performed late on 3/27, suggesting possible subacute infarct


- Brain MRI could not be performed; dental implant?


- repeat Head CT in 1-2 days


- appreciate neurology input


- started on IV heparin at this time due to pulling of NGT and failing swallow 

evaluation


- speech eval


- PT/OT





3/27


- patient with confusion, disorientation


- possibly related to Dilaudid use, also has had sepsis, hypotension episodes, 

BERNABE earlier in the admission


- appreciate neurology input on this matter, no need for further imaging


- Brain MRI, Head CT already performed, EEG reviewed by neurology


- slow improvement


- may need Ativan for significant anxiety as well





Severe Sepsis, E. coli Septicemia


Severe C. Diff Colitis


3/28


- WBC increasing


- will repeat abdomen/pelvis CT


- general surgery for further input


- continue IV Rocephin; unfortunately, pulled NGT, so can't do oral Vanco, 

currently on IV Flagyl





3/27


- blood cultures positive for E. coli; repeat blood cultures - no growth


- C. Diff positive, colitis noted on imaging


- started on IV Flagyl initially, converted to PO Vanco


- appreciate ID input


- will need PO Vanco for 28 days total


- will need Rocephin for 14 days since the negative blood culture on 3/22


- appreciate general surgery input regarding C. Diff - no surgical intervention 

at this time


- will monitor abdominal firmness/distention - if no improvement, repeat abd/

pelvis CT can be done





Paroxysmal A. Fib with RVR


3/28


- Eliquis stopped - awaiting speech evaluation


- IV heparin started


- will need either IV Lopressor or re-insertion of NGT





3/27


- intermittent A. fib and rapid ventricular response; likely from sepsis


- currently in NSR


- appreciate cardiology input - continue Lopressor for rate control


- Eliquis for anticoagulation


- had required IV Lopressor and Cardizem ggt - no longer should receive 

Cardizem due to pauses noted earlier in the admission





Biventricular Heart Failure


3/28


- required IV Lasix yesterday


- monitor fluid status





3/27


- patient presented with worsening lower extremity edema, hepatic congestion


- echo initially suggested significant R ventricular systolic function and 

decreased LVEF


- started on b-blocker, treated for infection, etc.


- repeat echo was performed on 3/26 with improvement of both ventricular 

function


- further management as per cardiology





Type 2 Demand Ischemia


- troponin elevation when she first came in - likely secondary to infection, 

anemia, etc.


- initially was on IV heparin, now discontinued


- currently on Eliquis for A. fib episodes


- continue b-blocker


- treating underlying infection





Hypertensive Urgency


- BP yesterday >200/100


- appreciate cardiology input


- started on Lopressor 50mg q6, Hydralazine and topical nitrates added


- anxiety may also be playing a part in elevated blood pressure


- will add low dose Ativan PRN





Hyperglycemia


- likely due to dextrose in tube feeds


- consult speech


- consult pharmacy glycemic control





Acute Kidney Injury


- resolved now; initially due to acute tubular necrosis, possible medication or 

IV contrast induced


- was given IVFs due to sepsis





Hepatic Congestion


- from biventricular heart failure


- monitor for fluid overload





Anemia - improving


- likely iron deficiency


- H/H stable





Thrombocytopenia - resolved





Abnormal Thyroid Function Test


- possibly due to acute illness, severe sepsis, etc.


- initially on methimazole; now d/c'd


- will need outpatient TSH, Free T4, T3; may need thyroid ultrasound and 

further testing


- outpatient endocrinology input





DVT ppx


- IV heparin





FULL CODE





PT/OT/speech ordered














Clinical course obtained from chart review:





March 19, 2018


Presented on 3/19 with significant bilateral lower extremity edema, fevers, 

chills, abdominal distention, nausea.





March 20, 2018


Patient admitted on 3/20 due to meeting severe sepsis criteria. She had 

significant lactic acidosis, tachycardia; later developed hypotension, 

leukocytosis, and elevated procalcitonin.


She was started on broad spectrum antibiotics, initially to cover cellulitis.





Imaging suggested shock bowel, hypotensive bowel - initial concern of liver 

cirrhosis. Further testing suggested significant R heart failure with hepatic 

congestion. Edematous lower extremities likely from the biventricular heart 

failure noted. Echo was done to confirm this diagnosis. LVEF at the time noted 

to be 35-40%. Dilatation of R atrium and R ventricle noted as well. Late on 3/20

, patient developed A. Fib with RVR and transferred to the ICU.





March 21, 2018


Patient spontaneously converted to sinus rhythm after being given IV Lopressor 

x2 and Cardizem ggt, which had to be stopped due to low blood pressure.


Troponin elevation noted, likely type 2 demand ischemia from infection, 

arrhythmia, sepsis.


Patient was started on IV heparin.





Patient was found to be positive for C. Diff Infection; CT suggested colitis. 

NGT was placed in the ICU and Vanco solution was given to treat this.


Blood cultures were positive at this time for gram negative organism.





Patient's mental status acutely worsened. Neurology was consulted and an MRI 

and EEG were performed. Attributed mental status change to metabolic 

encephalopathy.





Kidney function also acutely worsened; possibly from IV contrast from imaging 

done prior.





March 22, 2018


Nephrology consulted due to acute kidney injury and acute tubular necrosis. 

Consideration made for dialysis; though this was never required.


ID consulted - E. coli septicemia noted on blood cultures x2, repeat cultures 

were ordered.


Nephrology consulted - metabolic acidosis - possibly from sepsis and BERNABE.





March 23, 2018


General surgery consulted regarding C. Diff colitis, conservative management 

decided.


Patient at this time was going in and out of A. fib; initially started on IV 

Cardizem, but due to post-conversion pauses, this was stopped.


Was started on bicarb drip due to acidosis.


ID following for UTI, C. Diff colitis, E. coli septicemia.





March 24, 2018


Clinical improvement made on March 24 mentally.


Hypertension was still an issue and cardiology worked on getting this down by 

increasing b-blocker dose and adding hydralazine.





March 25, 2018


continuing PO Vancomycin and IV Rocephin


IV heparin stopped at this time. Eliquis had been started.


clinical picture continued to improve as per records.





March 26, 2018


Repeat TTE was done; improving biventricular heart failure noted.


Hypertensive Urgency still an issue on this day; cardiology adjusted meds to 

add nitropaste and Hydralazine, as well as an increase in Lopressor dose.
Subjective


Date of Service:


Mar 29, 2018.


Subjective


Pt evaluation today including:  conversation w/ patient, physical exam, lab 

review, review of studies, review of inpatient medication list


Saw/examined the patient in room 201


She is much more awake and alert today


She is conversing well and appropriately


Has some lower extremity pain; c/o LLE pain


Denies chest pain/shortness of breath/palpitations





As per nursing, noted to have L labial swelling





Problem List


Medical Problems:


(1) Anasarca


Status: Acute  





(2) Atrial fibrillation with RVR


Status: Acute  





(3) C. difficile diarrhea


Status: Acute  





(4) Elevated troponin


Status: Acute  





(5) Hyperthyroidism


Status: Acute  





(6) Hypotension


Status: Acute  





(7) Left leg cellulitis


Status: Acute  





(8) Left leg pain


Status: Acute  





(9) Right-sided heart failure


Status: Acute  





(10) Sepsis


Status: Acute  











Review of Systems


Constitutional:  No fever, No chills


Respiratory:  No cough, No sputum, No shortness of breath


Cardiac:  No chest pain, No edema, No palpitations


Musculoskeletal:  + joint pain, + muscle pain (LLE pain)





Medications





Current Inpatient Medications








 Medications


  (Trade)  Dose


 Ordered  Sig/Wilman


 Route  Start Time


 Stop Time Status Last Admin


Dose Admin


 


 Acetaminophen


  (Tylenol Tab)  325 mg  Q6H  PRN


 PO  3/20/18 03:15


 4/19/18 03:14  3/20/18 23:29


325 MG


 


 Pantoprazole


 Sodium 40 mg/


 Syringe  10 ml @ 5


 mls/min  DAILY@11


 IV  3/21/18 11:00


 4/20/18 10:59  3/29/18 11:07


5 MLS/MIN


 


 Raspberry


  (Raspberry Syrup


 5ml Cup)  5 ml  QID


 PO  3/21/18 09:30


 4/4/18 09:29  3/29/18 12:02


5 ML


 


 Vancomycin HCl


  (Vancomycin Oral


 Soln)  500 mg  QID


 PO  3/22/18 17:00


 4/5/18 16:59  3/29/18 12:03


500 MG


 


 Hydromorphone HCl


  (Dilaudid Inj)  0.5 mg  Q3H  PRN


 IV  3/23/18 10:45


 4/6/18 10:44  3/27/18 07:56


0.5 MG


 


 Ceftriaxone


 Sodium 2 gm/


 Dextrose  50 ml @ 


 100 mls/hr  Q24H


 IV  3/23/18 18:00


 4/1/18 17:59  3/28/18 17:27


100 MLS/HR


 


 Nystatin


  (Mycostatin Susp)  5 ml  QID


 PO  3/24/18 19:30


 3/31/18 19:29  3/29/18 12:02


5 ML


 


 Ondansetron HCl 6


 mg/Dextrose  53 ml @ 


 200 mls/hr  Q6H  PRN


 IV  3/25/18 00:30


 4/24/18 00:29  3/25/18 07:48


200 MLS/HR


 


 Nitroglycerin


  (Nitroglycerin


 2% Oint)  1 inch  Q6H


 EXT  3/26/18 10:00


 4/25/18 09:59  3/29/18 10:00


1 INCH


 


 Miscellaneous


 Information


  (Consult


 Glycemic


 Management


 Pharmacy)  1 ea  UD  PRN


 N/A  3/27/18 09:32


 4/26/18 09:31   


 


 


 Glucose


  (Glucose 40% Gel)  15-30


 GRAMS 15


 GRAMS...  UD  PRN


 PO  3/27/18 20:00


 4/26/18 19:59   


 


 


 Glucose


  (Glucose Chew


 Tab)  4-8


 Tablets 4


 Tabl...  UD  PRN


 PO  3/27/18 20:00


 4/26/18 19:59   


 


 


 Dextrose


  (Dextrose 50%


 50ML Syringe)  25-50ML OF


 50% DW IV


 FOR...  UD  PRN


 IV  3/27/18 20:00


 4/26/18 19:59   


 


 


 Glucagon


  (Glucagon Inj)  1 mg  UD  PRN


 SQ  3/27/18 20:00


 4/26/18 19:59   


 


 


 Heparin Sodium/


 Dextrose  500 ml @ 


 11 mls/hr  Q24H


 IV  3/27/18 23:30


 4/26/18 23:29  3/29/18 08:31


11 MLS/HR


 


 Acetaminophen 650


 mg/Empty Bag  65 ml @ 


 260 mls/hr  Q6H  PRN


 IV  3/27/18 23:45


 4/26/18 23:44   


 


 


 Dextrose  1,000 ml @ 


 40 mls/hr  Q24H


 IV  3/28/18 00:15


 4/27/18 00:14  3/29/18 00:02


40 MLS/HR


 


 Ipratropium


 Bromide


  (Atrovent 0.02%


 0.5MG/2.5ML Neb)  0.5 mg  Q4H  PRN


 INH  3/28/18 00:15


 4/27/18 00:14   


 


 


 Levalbuterol


  (Xopenex 1.25MG/


 0.5ML Neb)  1.25 mg  Q4H  PRN


 INH  3/28/18 00:15


 4/27/18 00:14   


 


 


 Ioversol


  (Optiray 320)  100 ml  UD  PRN


 IV  3/28/18 08:45


 4/1/18 08:44   


 


 


 Insulin Glargine


  (Lantus Solostar


 Pen)  SEE


 PROTOCOL


 TEXT  HS


 SC  3/28/18 21:00


 4/27/18 20:59   


 


 


 Metoprolol


 Tartrate


  (Lopressor Tab)  25 mg  Q6


 PO  3/28/18 18:00


 4/23/18 20:59  3/29/18 12:02


25 MG


 


 Insulin Aspart


  (novoLOG ASPART)  SLIDING


 SCALE  ACHS


 SC  3/29/18 07:00


 4/28/18 06:59  3/29/18 12:01


2 UNITS


 


 Hydralazine HCl


  (Apresoline Tab)  12.5 mg  TID


 PO  3/29/18 14:00


 4/28/18 13:59   


 











Objective


Vital Signs











  Date Time  Temp Pulse Resp B/P (MAP) Pulse Ox O2 Delivery O2 Flow Rate FiO2


 


3/29/18 11:46 36.6 75 16 162/81 (108) 93 Room Air  


 


3/29/18 08:00     92 Room Air  


 


3/29/18 07:39 36.7 80 16 148/109 (122) 92 Room Air  


 


3/29/18 05:46 37.1 82 19 173/91 (118) 91 Room Air  


 


3/29/18 04:20      Room Air  


 


3/29/18 00:15      Room Air  


 


3/28/18 23:03 36.7 87 26 146/70 (95) 96 Room Air  


 


3/28/18 15:36 36.8 87 20 164/92 (116) 95 Nasal Cannula 2.0 











Physical Exam


General Appearance:  no apparent distress, + pertinent finding (much more awake/

alert, conversing)


Respiratory/Chest:  no respiratory distress, no accessory muscle use, + 

decreased breath sounds (b/l bases), + crackles


Cardiovascular:  regular rate, rhythm, no edema, no murmur


Abdomen:  normal bowel sounds, non tender, soft


Extremities:  normal inspection, no pedal edema, + pertinent finding


Neurologic/Psychiatric:  no motor/sensory deficits, alert





Laboratory Results





Last 24 Hours








Test


  3/28/18


13:41 3/28/18


16:17 3/28/18


20:13 3/29/18


05:53


 


Sodium Level 142 mmol/L    140 mmol/L 


 


Potassium Level 4.2 mmol/L    3.8 mmol/L 


 


Chloride Level 108 mmol/L    106 mmol/L 


 


Carbon Dioxide Level 28 mmol/L    28 mmol/L 


 


Anion Gap 7.0 mmol/L    6.0 mmol/L 


 


Blood Urea Nitrogen 25 mg/dl    21 mg/dl 


 


Creatinine 0.72 mg/dl    0.69 mg/dl 


 


Est Creatinine Clear Calc


Drug Dose 71.8 ml/min 


  


  


  74.9 ml/min 


 


 


Estimated GFR (


American) 105.5 


  


  


  109.7 


 


 


Estimated GFR (Non-


American 91.0 


  


  


  94.6 


 


 


BUN/Creatinine Ratio 35.4    30.3 


 


Random Glucose 81 mg/dl    124 mg/dl 


 


Calcium Level 8.4 mg/dl    8.1 mg/dl 


 


Bedside Glucose  75 mg/dl  139 mg/dl  


 


White Blood Count    19.40 K/uL 


 


Red Blood Count    3.99 M/uL 


 


Hemoglobin    11.2 g/dL 


 


Hematocrit    32.9 % 


 


Mean Corpuscular Volume    82.5 fL 


 


Mean Corpuscular Hemoglobin    28.1 pg 


 


Mean Corpuscular Hemoglobin


Concent 


  


  


  34.0 g/dl 


 


 


RDW Standard Deviation    42.6 fL 


 


RDW Coefficient of Variation    14.5 % 


 


Platelet Count    574 K/uL 


 


Mean Platelet Volume    10.2 fL 


 


Activated Partial


Thromboplast Time 


  


  


  82.9 SECONDS 


 


 


Partial Thromboplastin Ratio    3.2 


 


Test


  3/29/18


06:42 


  


  


 


 


Bedside Glucose 113 mg/dl    











Assessment and Plan


This is a 62 year old female with no past medical history prior to arrival.





Metabolic Encephalopathy


Possible Subacute R Cerebellar Infarct


3/29


- Brain MRI suggests bilateral cerebellar infarcts


- appreciate neurology evaluation


- currently on IV heparin


- will continue speech/PT/OT


- clinically improving





3/28


- Head CT performed late on 3/27, suggesting possible subacute infarct


- Brain MRI could not be performed; dental implant?


- repeat Head CT in 1-2 days


- appreciate neurology input


- started on IV heparin at this time due to pulling of NGT and failing swallow 

evaluation


- speech eval


- PT/OT





3/27


- patient with confusion, disorientation


- possibly related to Dilaudid use, also has had sepsis, hypotension episodes, 

BERNABE earlier in the admission


- appreciate neurology input on this matter, no need for further imaging


- Brain MRI, Head CT already performed, EEG reviewed by neurology


- slow improvement


- may need Ativan for significant anxiety as well





Severe Sepsis, E. coli Septicemia


Severe C. Diff Colitis


3/29


- decrease in WBC count today


- continue Rocephin for 14 days after the last negative blood culture


- continue oral Vanco for the C. Diff


- labial fold swelling noted; possible Bartholin abscess? - consulted OB/gyn 

and added warm compresses to the region





3/28


- WBC increasing


- will repeat abdomen/pelvis CT


- general surgery for further input


- continue IV Rocephin; unfortunately, pulled NGT, so can't do oral Vanco, 

currently on IV Flagyl





3/27


- blood cultures positive for E. coli; repeat blood cultures - no growth


- C. Diff positive, colitis noted on imaging


- started on IV Flagyl initially, converted to PO Vanco


- appreciate ID input


- will need PO Vanco for 28 days total


- will need Rocephin for 14 days since the negative blood culture on 3/22


- appreciate general surgery input regarding C. Diff - no surgical intervention 

at this time


- will monitor abdominal firmness/distention - if no improvement, repeat abd/

pelvis CT can be done





Paroxysmal A. Fib with RVR


3/29


- currently on PO Metoprolol as per cardiology, appreciate input


- IV heparin at this time





3/28


- Eliquis stopped - awaiting speech evaluation


- IV heparin started


- will need either IV Lopressor or re-insertion of NGT





3/27


- intermittent A. fib and rapid ventricular response; likely from sepsis


- currently in NSR


- appreciate cardiology input - continue Lopressor for rate control


- Eliquis for anticoagulation


- had required IV Lopressor and Cardizem ggt - no longer should receive 

Cardizem due to pauses noted earlier in the admission





Biventricular Heart Failure


Bilateral Pleural Effusions


3/29


- persistent bilateral pleural effusions


- pulmonary consulted for possible thoracentesis on one side





3/28


- required IV Lasix yesterday


- monitor fluid status





3/27


- patient presented with worsening lower extremity edema, hepatic congestion


- echo initially suggested significant R ventricular systolic function and 

decreased LVEF


- started on b-blocker, treated for infection, etc.


- repeat echo was performed on 3/26 with improvement of both ventricular 

function


- further management as per cardiology





Type 2 Demand Ischemia


- troponin elevation when she first came in - likely secondary to infection, 

anemia, etc.


- initially was on IV heparin, now discontinued


- currently on Eliquis for A. fib episodes


- continue b-blocker


- treating underlying infection





Hypertensive Urgency


3/29


- appreciate cardiology input


- currently attempting nitro paste and hydralazine for blood pressure control


- holding off on ACE-I in the short term as per nephrology





3/28


- BP yesterday >200/100


- appreciate cardiology input


- started on Lopressor 50mg q6, Hydralazine and topical nitrates added


- anxiety may also be playing a part in elevated blood pressure


- will add low dose Ativan PRN





Hyperglycemia - resolved


Hypoglycemia


3/29


- blood sugar is now on the lower end


- trying low dose D5, monitor blood sugars, should improve as she is eating now





3/28


- likely due to dextrose in tube feeds


- consult speech


- consult pharmacy glycemic control





Acute Kidney Injury - resolved


- resolved now; initially due to acute tubular necrosis, possible medication or 

IV contrast induced


- was given IVFs due to sepsis





Hepatic Congestion


- from biventricular heart failure


- monitor for fluid overload





Anemia - improving


- likely iron deficiency


- H/H stable





Thrombocytopenia - resolved





Abnormal Thyroid Function Test


- possibly due to acute illness, severe sepsis, etc.


- initially on methimazole; now d/c'd


- will need outpatient TSH, Free T4, T3; may need thyroid ultrasound and 

further testing


- outpatient endocrinology input





DVT ppx


- IV heparin





FULL CODE





PT/OT/speech ordered














Clinical course obtained from chart review:





March 19, 2018


Presented on 3/19 with significant bilateral lower extremity edema, fevers, 

chills, abdominal distention, nausea.





March 20, 2018


Patient admitted on 3/20 due to meeting severe sepsis criteria. She had 

significant lactic acidosis, tachycardia; later developed hypotension, 

leukocytosis, and elevated procalcitonin.


She was started on broad spectrum antibiotics, initially to cover cellulitis.





Imaging suggested shock bowel, hypotensive bowel - initial concern of liver 

cirrhosis. Further testing suggested significant R heart failure with hepatic 

congestion. Edematous lower extremities likely from the biventricular heart 

failure noted. Echo was done to confirm this diagnosis. LVEF at the time noted 

to be 35-40%. Dilatation of R atrium and R ventricle noted as well. Late on 3/20

, patient developed A. Fib with RVR and transferred to the ICU.





March 21, 2018


Patient spontaneously converted to sinus rhythm after being given IV Lopressor 

x2 and Cardizem ggt, which had to be stopped due to low blood pressure.


Troponin elevation noted, likely type 2 demand ischemia from infection, 

arrhythmia, sepsis.


Patient was started on IV heparin.





Patient was found to be positive for C. Diff Infection; CT suggested colitis. 

NGT was placed in the ICU and Vanco solution was given to treat this.


Blood cultures were positive at this time for gram negative organism.





Patient's mental status acutely worsened. Neurology was consulted and an MRI 

and EEG were performed. Attributed mental status change to metabolic 

encephalopathy.





Kidney function also acutely worsened; possibly from IV contrast from imaging 

done prior.





March 22, 2018


Nephrology consulted due to acute kidney injury and acute tubular necrosis. 

Consideration made for dialysis; though this was never required.


ID consulted - E. coli septicemia noted on blood cultures x2, repeat cultures 

were ordered.


Nephrology consulted - metabolic acidosis - possibly from sepsis and BERNABE.





March 23, 2018


General surgery consulted regarding C. Diff colitis, conservative management 

decided.


Patient at this time was going in and out of A. fib; initially started on IV 

Cardizem, but due to post-conversion pauses, this was stopped.


Was started on bicarb drip due to acidosis.


ID following for UTI, C. Diff colitis, E. coli septicemia.





March 24, 2018


Clinical improvement made on March 24 mentally.


Hypertension was still an issue and cardiology worked on getting this down by 

increasing b-blocker dose and adding hydralazine.





March 25, 2018


continuing PO Vancomycin and IV Rocephin


IV heparin stopped at this time. Eliquis had been started.


clinical picture continued to improve as per records.





March 26, 2018


Repeat TTE was done; improving biventricular heart failure noted.


Hypertensive Urgency still an issue on this day; cardiology adjusted meds to 

add nitropaste and Hydralazine, as well as an increase in Lopressor dose.
Subjective


Date of Service:


Mar 30, 2018.


Subjective


Pt evaluation today including:  conversation w/ patient, physical exam, lab 

review, review of studies, review of inpatient medication list


Saw/examined the patient in room 201


She is feeling much better, awake, alert, oriented x3


She has been eating breakfast prior to my arrival


Denies any pain; does relay that she feels her L lower extremity is swollen


Does not c/o shortness of breath, but does appear to get winded when talking





Problem List


Medical Problems:


(1) Anasarca


Status: Acute  





(2) Atrial fibrillation with RVR


Status: Acute  





(3) C. difficile diarrhea


Status: Acute  





(4) Elevated troponin


Status: Acute  





(5) Hyperthyroidism


Status: Acute  





(6) Hypotension


Status: Acute  





(7) Left leg cellulitis


Status: Acute  





(8) Left leg pain


Status: Acute  





(9) Right-sided heart failure


Status: Acute  





(10) Sepsis


Status: Acute  











Review of Systems


Respiratory:  No cough, No sputum, No shortness of breath


Cardiac:  No chest pain, No edema, No palpitations





Medications





Current Inpatient Medications








 Medications


  (Trade)  Dose


 Ordered  Sig/Wilman


 Route  Start Time


 Stop Time Status Last Admin


Dose Admin


 


 Acetaminophen


  (Tylenol Tab)  325 mg  Q6H  PRN


 PO  3/20/18 03:15


 4/19/18 03:14  3/20/18 23:29


325 MG


 


 Pantoprazole


 Sodium 40 mg/


 Syringe  10 ml @ 5


 mls/min  DAILY@11


 IV  3/21/18 11:00


 4/20/18 10:59  3/29/18 11:07


5 MLS/MIN


 


 Raspberry


  (Raspberry Syrup


 5ml Cup)  5 ml  QID


 PO  3/21/18 09:30


 4/4/18 09:29  3/29/18 21:19


5 ML


 


 Vancomycin HCl


  (Vancomycin Oral


 Soln)  500 mg  QID


 PO  3/22/18 17:00


 4/5/18 16:59  3/29/18 21:21


500 MG


 


 Hydromorphone HCl


  (Dilaudid Inj)  0.5 mg  Q3H  PRN


 IV  3/23/18 10:45


 4/6/18 10:44  3/27/18 07:56


0.5 MG


 


 Ceftriaxone


 Sodium 2 gm/


 Dextrose  50 ml @ 


 100 mls/hr  Q24H


 IV  3/23/18 18:00


 4/1/18 17:59  3/29/18 17:29


100 MLS/HR


 


 Nystatin


  (Mycostatin Susp)  5 ml  QID


 PO  3/24/18 19:30


 3/31/18 19:29  3/29/18 17:30


5 ML


 


 Ondansetron HCl 6


 mg/Dextrose  53 ml @ 


 200 mls/hr  Q6H  PRN


 IV  3/25/18 00:30


 4/24/18 00:29  3/25/18 07:48


200 MLS/HR


 


 Nitroglycerin


  (Nitroglycerin


 2% Oint)  1 inch  Q6H


 EXT  3/26/18 10:00


 4/25/18 09:59  3/30/18 04:31


1 INCH


 


 Miscellaneous


 Information


  (Consult


 Glycemic


 Management


 Pharmacy)  1 ea  UD  PRN


 N/A  3/27/18 09:32


 4/26/18 09:31   


 


 


 Glucose


  (Glucose 40% Gel)  15-30


 GRAMS 15


 GRAMS...  UD  PRN


 PO  3/27/18 20:00


 4/26/18 19:59   


 


 


 Glucose


  (Glucose Chew


 Tab)  4-8


 Tablets 4


 Tabl...  UD  PRN


 PO  3/27/18 20:00


 4/26/18 19:59   


 


 


 Dextrose


  (Dextrose 50%


 50ML Syringe)  25-50ML OF


 50% DW IV


 FOR...  UD  PRN


 IV  3/27/18 20:00


 4/26/18 19:59   


 


 


 Glucagon


  (Glucagon Inj)  1 mg  UD  PRN


 SQ  3/27/18 20:00


 4/26/18 19:59   


 


 


 Heparin Sodium/


 Dextrose  500 ml @ 


 12 mls/hr  Q24H


 IV  3/27/18 23:30


 4/26/18 23:29  3/29/18 08:31


11 MLS/HR


 


 Acetaminophen 650


 mg/Empty Bag  65 ml @ 


 260 mls/hr  Q6H  PRN


 IV  3/27/18 23:45


 4/26/18 23:44   


 


 


 Ipratropium


 Bromide


  (Atrovent 0.02%


 0.5MG/2.5ML Neb)  0.5 mg  Q4H  PRN


 INH  3/28/18 00:15


 4/27/18 00:14   


 


 


 Levalbuterol


  (Xopenex 1.25MG/


 0.5ML Neb)  1.25 mg  Q4H  PRN


 INH  3/28/18 00:15


 4/27/18 00:14   


 


 


 Ioversol


  (Optiray 320)  100 ml  UD  PRN


 IV  3/28/18 08:45


 4/1/18 08:44   


 


 


 Metoprolol


 Tartrate


  (Lopressor Tab)  25 mg  Q6


 PO  3/28/18 18:00


 4/23/18 20:59  3/30/18 06:37


25 MG


 


 Insulin Aspart


  (novoLOG ASPART)  SLIDING


 SCALE  ACHS


 SC  3/29/18 07:00


 4/28/18 06:59  3/29/18 17:34


3 UNITS


 


 Insulin Glargine


  (Lantus Solostar


 Pen)  SEE


 PROTOCOL


 TEXT  QAM


 SC  3/30/18 09:00


 4/29/18 08:59   


 


 


 Hydralazine HCl


  (Apresoline Tab)  25 mg  TID


 PO  3/30/18 09:00


 4/28/18 13:59   


 











Objective


Vital Signs











  Date Time  Temp Pulse Resp B/P (MAP) Pulse Ox O2 Delivery O2 Flow Rate FiO2


 


3/30/18 07:49 37.1 73 22 165/96 (119) 95   


 


3/30/18 06:35  70  168/95 (119)    


 


3/30/18 04:00      Room Air  


 


3/30/18 02:45 36.9 66 27 167/90 (115) 95 Room Air  


 


3/29/18 23:59      Room Air  


 


3/29/18 23:17 36.5 63 21 146/90 (108) 94 Room Air  


 


3/29/18 20:32 36.4 62 22 122/69 (86) 94 Room Air  


 


3/29/18 20:00      Room Air  


 


3/29/18 16:29 36.4 70 24 128/88 (101) 94 Room Air  


 


3/29/18 16:00     92 Room Air  


 


3/29/18 12:00     92 Room Air  


 


3/29/18 11:46 36.6 75 16 162/81 (108) 93 Room Air  











Physical Exam


General Appearance:  no apparent distress, + pertinent finding (awake, alert, 

more talkative)


Respiratory/Chest:  no respiratory distress, no accessory muscle use, + 

decreased breath sounds (bilateral bases), + pertinent finding (shortness of 

breath while speaking)


Cardiovascular:  regular rate, rhythm, no edema, no murmur


Extremities:  no pedal edema, + pertinent finding (tenderness, L lower extremity

; skin tightening, there is not pitting, but does appear slightly swollen 

throughout)


Neurologic/Psychiatric:  no motor/sensory deficits, alert, normal mood/affect, 

oriented x 3


Skin:  normal color


Lymphatic:  no adenopathy





Laboratory Results





Last 24 Hours








Test


  3/29/18


11:04 3/29/18


12:57 3/29/18


14:10 3/29/18


16:31


 


Bedside Glucose 142 mg/dl    164 mg/dl 


 


Activated Partial


Thromboplast Time 


  52.6 SECONDS 


  


  


 


 


Partial Thromboplastin Ratio  2.0   


 


Urine Color   DK YELLOW  


 


Urine Appearance   CLEAR  


 


Urine pH   6.0  


 


Urine Specific Gravity   1.026  


 


Urine Protein   NEG  


 


Urine Glucose (UA)   NEG  


 


Urine Ketones   NEG  


 


Urine Occult Blood   NEG  


 


Urine Nitrite   POS  


 


Urine Bilirubin   NEG  


 


Urine Urobilinogen   NEG  


 


Urine Leukocyte Esterase   TRACE  


 


Urine WBC (Auto)   5-10 /hpf  


 


Urine RBC (Auto)   5-10 /hpf  


 


Urine Hyaline Casts (Auto)   5-10 /lpf  


 


Urine Epithelial Cells (Auto)   20-30 /lpf  


 


Urine Bacteria (Auto)   NEG  


 


Urine Crystals   TALC  


 


Urine Yeast (Auto)   BUDDING  


 


Test


  3/29/18


20:35 3/30/18


05:15 3/30/18


06:35 


 


 


Bedside Glucose 151 mg/dl   143 mg/dl  


 


White Blood Count  16.09 K/uL   


 


Red Blood Count  4.43 M/uL   


 


Hemoglobin  12.1 g/dL   


 


Hematocrit  36.3 %   


 


Mean Corpuscular Volume  81.9 fL   


 


Mean Corpuscular Hemoglobin  27.3 pg   


 


Mean Corpuscular Hemoglobin


Concent 


  33.3 g/dl 


  


  


 


 


Platelet Count  624 K/uL   


 


Mean Platelet Volume  10.9 fL   


 


Neutrophils (%) (Auto)  64.5 %   


 


Lymphocytes (%) (Auto)  26.0 %   


 


Monocytes (%) (Auto)  7.5 %   


 


Eosinophils (%) (Auto)  1.1 %   


 


Basophils (%) (Auto)  0.2 %   


 


Neutrophils # (Auto)  10.37 K/uL   


 


Lymphocytes # (Auto)  4.19 K/uL   


 


Monocytes # (Auto)  1.21 K/uL   


 


Eosinophils # (Auto)  0.18 K/uL   


 


Basophils # (Auto)  0.03 K/uL   


 


RDW Standard Deviation  42.5 fL   


 


RDW Coefficient of Variation  14.4 %   


 


Immature Granulocyte % (Auto)  0.7 %   


 


Immature Granulocyte # (Auto)  0.11 K/uL   


 


Activated Partial


Thromboplast Time 


  42.7 SECONDS 


  


  


 


 


Partial Thromboplastin Ratio  1.6   


 


Sodium Level  136 mmol/L   


 


Potassium Level  3.6 mmol/L   


 


Chloride Level  103 mmol/L   


 


Carbon Dioxide Level  28 mmol/L   


 


Anion Gap  5.0 mmol/L   


 


Blood Urea Nitrogen  19 mg/dl   


 


Creatinine  0.71 mg/dl   


 


Est Creatinine Clear Calc


Drug Dose 


  70.1 ml/min 


  


  


 


 


Estimated GFR (


American) 


  107.3 


  


  


 


 


Estimated GFR (Non-


American 


  92.6 


  


  


 


 


BUN/Creatinine Ratio  26.3   


 


Random Glucose  174 mg/dl   


 


Calcium Level  8.4 mg/dl   











Assessment and Plan


This is a 62 year old female with no past medical history prior to arrival.





Metabolic Encephalopathy


Bilateral Cerebellar Hemisphere Infarcts


3/30


- appreciate neurology input


- will need continued PT/OT/speech evaluations


- currently on IV heparin; will transition to oral anticoagulant prior to 

discharge





3/29


- Brain MRI suggests bilateral cerebellar infarcts


- appreciate neurology evaluation


- currently on IV heparin


- will continue speech/PT/OT


- clinically improving





3/28


- Head CT performed late on 3/27, suggesting possible subacute infarct


- Brain MRI could not be performed; dental implant?


- repeat Head CT in 1-2 days


- appreciate neurology input


- started on IV heparin at this time due to pulling of NGT and failing swallow 

evaluation


- speech eval


- PT/OT





3/27


- patient with confusion, disorientation


- possibly related to Dilaudid use, also has had sepsis, hypotension episodes, 

BERNABE earlier in the admission


- appreciate neurology input on this matter, no need for further imaging


- Brain MRI, Head CT already performed, EEG reviewed by neurology


- slow improvement


- may need Ativan for significant anxiety as well





Severe Sepsis, E. coli Septicemia


Severe C. Diff Colitis - improving


3/30


- white count improving to 16k from 19k


- will need Rocephin x14 days from last negative blood culture (day #8/14)


- for the C. Diff patient is on oral Vanco and IV Flagyl; will d/c Flagyl in AM 

(3/31)





3/29


- decrease in WBC count today


- continue Rocephin for 14 days after the last negative blood culture


- continue oral Vanco for the C. Diff


- labial fold swelling noted; possible Bartholin abscess? - consulted OB/gyn 

and added warm compresses to the region





3/28


- WBC increasing


- will repeat abdomen/pelvis CT


- general surgery for further input


- continue IV Rocephin; unfortunately, pulled NGT, so can't do oral Vanco, 

currently on IV Flagyl





3/27


- blood cultures positive for E. coli; repeat blood cultures - no growth


- C. Diff positive, colitis noted on imaging


- started on IV Flagyl initially, converted to PO Vanco


- appreciate ID input


- will need PO Vanco for 28 days total


- will need Rocephin for 14 days since the negative blood culture on 3/22


- appreciate general surgery input regarding C. Diff - no surgical intervention 

at this time


- will monitor abdominal firmness/distention - if no improvement, repeat abd/

pelvis CT can be done





Paroxysmal A. Fib with RVR - resolved


3/30


- currently in NSR, rate controlled with b-blocker


- IV heparin ggt at this time





3/29


- currently on PO Metoprolol as per cardiology, appreciate input


- IV heparin at this time





3/28


- Eliquis stopped - awaiting speech evaluation


- IV heparin started


- will need either IV Lopressor or re-insertion of NGT





3/27


- intermittent A. fib and rapid ventricular response; likely from sepsis


- currently in NSR


- appreciate cardiology input - continue Lopressor for rate control


- Eliquis for anticoagulation


- had required IV Lopressor and Cardizem ggt - no longer should receive 

Cardizem due to pauses noted earlier in the admission





Biventricular Heart Failure


Bilateral Pleural Effusions


3/30


- will consult pulm regarding bilateral pleural effusion and possible need for 

thoracentesis


- appreciate cardiology input - intermittently receiving Lasix, received some 

last evening





3/29


- persistent bilateral pleural effusions


- pulmonary consulted for possible thoracentesis on one side





3/28


- required IV Lasix yesterday


- monitor fluid status





3/27


- patient presented with worsening lower extremity edema, hepatic congestion


- echo initially suggested significant R ventricular systolic function and 

decreased LVEF


- started on b-blocker, treated for infection, etc.


- repeat echo was performed on 3/26 with improvement of both ventricular 

function


- further management as per cardiology





Type 2 Demand Ischemia


- troponin elevation when she first came in - likely secondary to infection, 

anemia, etc.


- initially was on IV heparin, now back on it


- now off of Eliquis


- continue b-blocker


- treating underlying infection





Hypertensive Urgency - improving


3/30


- appreciate cardio input regarding blood pressure


- on nitro paste and Hydralazine as well as b-blocker


- will increase hydralazine dose at this time





3/29


- appreciate cardiology input


- currently attempting nitro paste and hydralazine for blood pressure control


- holding off on ACE-I in the short term as per nephrology





3/28


- BP yesterday >200/100


- appreciate cardiology input


- started on Lopressor 50mg q6, Hydralazine and topical nitrates added


- anxiety may also be playing a part in elevated blood pressure


- will add low dose Ativan PRN





Hyperglycemia - resolved


Hypoglycemia


3/30


- off of D5 fluid


- sugars are improving and within goal range


- appreciate pharmacy input





3/29


- blood sugar is now on the lower end


- trying low dose D5, monitor blood sugars, should improve as she is eating now





3/28


- likely due to dextrose in tube feeds


- consult speech


- consult pharmacy glycemic control





Acute Kidney Injury - resolved


- resolved now; initially due to acute tubular necrosis, possible medication or 

IV contrast induced


- was given IVFs due to sepsis





Hepatic Congestion


- from biventricular heart failure


- monitor for fluid overload





Anemia - improving


- likely iron deficiency


- H/H stable





Thrombocytopenia - resolved





Abnormal Thyroid Function Test


- possibly due to acute illness, severe sepsis, etc.


- initially on methimazole; now d/c'd


- will need outpatient TSH, Free T4, T3; may need thyroid ultrasound and 

further testing


- outpatient endocrinology input





DVT ppx


- IV heparin





FULL CODE





PT/OT/speech ordered














Clinical course obtained from chart review:





March 19, 2018


Presented on 3/19 with significant bilateral lower extremity edema, fevers, 

chills, abdominal distention, nausea.





March 20, 2018


Patient admitted on 3/20 due to meeting severe sepsis criteria. She had 

significant lactic acidosis, tachycardia; later developed hypotension, 

leukocytosis, and elevated procalcitonin.


She was started on broad spectrum antibiotics, initially to cover cellulitis.





Imaging suggested shock bowel, hypotensive bowel - initial concern of liver 

cirrhosis. Further testing suggested significant R heart failure with hepatic 

congestion. Edematous lower extremities likely from the biventricular heart 

failure noted. Echo was done to confirm this diagnosis. LVEF at the time noted 

to be 35-40%. Dilatation of R atrium and R ventricle noted as well. Late on 3/20

, patient developed A. Fib with RVR and transferred to the ICU.





March 21, 2018


Patient spontaneously converted to sinus rhythm after being given IV Lopressor 

x2 and Cardizem ggt, which had to be stopped due to low blood pressure.


Troponin elevation noted, likely type 2 demand ischemia from infection, 

arrhythmia, sepsis.


Patient was started on IV heparin.





Patient was found to be positive for C. Diff Infection; CT suggested colitis. 

NGT was placed in the ICU and Vanco solution was given to treat this.


Blood cultures were positive at this time for gram negative organism.





Patient's mental status acutely worsened. Neurology was consulted and an MRI 

and EEG were performed. Attributed mental status change to metabolic 

encephalopathy.





Kidney function also acutely worsened; possibly from IV contrast from imaging 

done prior.





March 22, 2018


Nephrology consulted due to acute kidney injury and acute tubular necrosis. 

Consideration made for dialysis; though this was never required.


ID consulted - E. coli septicemia noted on blood cultures x2, repeat cultures 

were ordered.


Nephrology consulted - metabolic acidosis - possibly from sepsis and BERNABE.





March 23, 2018


General surgery consulted regarding C. Diff colitis, conservative management 

decided.


Patient at this time was going in and out of A. fib; initially started on IV 

Cardizem, but due to post-conversion pauses, this was stopped.


Was started on bicarb drip due to acidosis.


ID following for UTI, C. Diff colitis, E. coli septicemia.





March 24, 2018


Clinical improvement made on March 24 mentally.


Hypertension was still an issue and cardiology worked on getting this down by 

increasing b-blocker dose and adding hydralazine.





March 25, 2018


continuing PO Vancomycin and IV Rocephin


IV heparin stopped at this time. Eliquis had been started.


clinical picture continued to improve as per records.





March 26, 2018


Repeat TTE was done; improving biventricular heart failure noted.


Hypertensive Urgency still an issue on this day; cardiology adjusted meds to 

add nitropaste and Hydralazine, as well as an increase in Lopressor dose.
Subjective


Date of Service:


Mar 31, 2018.


Subjective


Pt evaluation today including:  conversation w/ patient, physical exam, lab 

review, review of studies, conversation w/ consultant, review of inpatient 

medication list


Saw/examined the patient in room 201 this morning


IV heparin was stopped last evening; in preparation for thoracentesis this 

morning, as of yet, not performed


She is doing fine, talking and conversing well, mentally back to baseline


+weakness persists





Problem List


Medical Problems:


(1) Anasarca


Status: Acute  





(2) Atrial fibrillation with RVR


Status: Acute  





(3) C. difficile diarrhea


Status: Acute  





(4) Elevated troponin


Status: Acute  





(5) Hyperthyroidism


Status: Acute  





(6) Hypotension


Status: Acute  





(7) Left leg cellulitis


Status: Acute  





(8) Left leg pain


Status: Acute  





(9) Right-sided heart failure


Status: Acute  





(10) Sepsis


Status: Acute  











Review of Systems


Constitutional:  + weakness


Respiratory:  No cough, No sputum, No shortness of breath


Cardiac:  No chest pain, No edema, No palpitations


Abdomen:  No pain, No nausea, No vomiting, No diarrhea


Musculoskeletal:  + joint pain (LLE)


Heme:  No abnormal bleeding/bruising





Medications





Current Inpatient Medications








 Medications


  (Trade)  Dose


 Ordered  Sig/Wilman


 Route  Start Time


 Stop Time Status Last Admin


Dose Admin


 


 Acetaminophen


  (Tylenol Tab)  325 mg  Q6H  PRN


 PO  3/20/18 03:15


 4/19/18 03:14  3/20/18 23:29


325 MG


 


 Pantoprazole


 Sodium 40 mg/


 Syringe  10 ml @ 5


 mls/min  DAILY@11


 IV  3/21/18 11:00


 4/20/18 10:59  3/30/18 11:00


5 MLS/MIN


 


 Vancomycin HCl


  (Vancomycin Oral


 Soln)  500 mg  QID


 PO  3/22/18 17:00


 4/5/18 16:59  3/30/18 21:22


500 MG


 


 Hydromorphone HCl


  (Dilaudid Inj)  0.5 mg  Q3H  PRN


 IV  3/23/18 10:45


 4/6/18 10:44  3/27/18 07:56


0.5 MG


 


 Ceftriaxone


 Sodium 2 gm/


 Dextrose  50 ml @ 


 100 mls/hr  Q24H


 IV  3/23/18 18:00


 4/1/18 17:59  3/30/18 18:37


100 MLS/HR


 


 Nystatin


  (Mycostatin Susp)  5 ml  QID


 PO  3/24/18 19:30


 3/31/18 19:29  3/30/18 21:23


5 ML


 


 Ondansetron HCl 6


 mg/Dextrose  53 ml @ 


 200 mls/hr  Q6H  PRN


 IV  3/25/18 00:30


 4/24/18 00:29  3/25/18 07:48


200 MLS/HR


 


 Nitroglycerin


  (Nitroglycerin


 2% Oint)  1 inch  Q6H


 EXT  3/26/18 10:00


 4/25/18 09:59  3/31/18 04:04


1 INCH


 


 Miscellaneous


 Information


  (Consult


 Glycemic


 Management


 Pharmacy)  1 ea  UD  PRN


 N/A  3/27/18 09:32


 4/26/18 09:31   


 


 


 Glucose


  (Glucose 40% Gel)  15-30


 GRAMS 15


 GRAMS...  UD  PRN


 PO  3/27/18 20:00


 4/26/18 19:59   


 


 


 Glucose


  (Glucose Chew


 Tab)  4-8


 Tablets 4


 Tabl...  UD  PRN


 PO  3/27/18 20:00


 4/26/18 19:59   


 


 


 Dextrose


  (Dextrose 50%


 50ML Syringe)  25-50ML OF


 50% DW IV


 FOR...  UD  PRN


 IV  3/27/18 20:00


 4/26/18 19:59   


 


 


 Glucagon


  (Glucagon Inj)  1 mg  UD  PRN


 SQ  3/27/18 20:00


 4/26/18 19:59   


 


 


 Heparin Sodium/


 Dextrose  500 ml @ 


 12 mls/hr  Q24H


 IV  3/27/18 23:30


 4/26/18 23:29  3/30/18 18:37


12 MLS/HR


 


 Acetaminophen 650


 mg/Empty Bag  65 ml @ 


 260 mls/hr  Q6H  PRN


 IV  3/27/18 23:45


 4/26/18 23:44   


 


 


 Ipratropium


 Bromide


  (Atrovent 0.02%


 0.5MG/2.5ML Neb)  0.5 mg  Q4H  PRN


 INH  3/28/18 00:15


 4/27/18 00:14   


 


 


 Levalbuterol


  (Xopenex 1.25MG/


 0.5ML Neb)  1.25 mg  Q4H  PRN


 INH  3/28/18 00:15


 4/27/18 00:14   


 


 


 Ioversol


  (Optiray 320)  100 ml  UD  PRN


 IV  3/28/18 08:45


 4/1/18 08:44   


 


 


 Metoprolol


 Tartrate


  (Lopressor Tab)  25 mg  Q6


 PO  3/28/18 18:00


 4/23/18 20:59  3/30/18 23:15


25 MG


 


 Insulin Aspart


  (novoLOG ASPART)  SLIDING


 SCALE  ACHS


 SC  3/29/18 07:00


 4/28/18 06:59  3/30/18 21:30


1 UNITS


 


 Hydralazine HCl


  (Apresoline Tab)  25 mg  TID


 PO  3/30/18 09:00


 4/28/18 13:59  3/30/18 21:23


25 MG


 


 Raspberry


  (Raspberry Syrup


 5ml Cup)  2.5 ml  QID


 PO  3/30/18 09:00


 4/13/18 08:59  3/30/18 21:22


2.5 ML


 


 Insulin Glargine


  (Lantus Solostar


 Pen)  SEE


 PROTOCOL


 TEXT  BID


 SC  3/30/18 21:00


 4/29/18 20:59  3/30/18 21:30


10 UNITS











Objective


Vital Signs











  Date Time  Temp Pulse Resp B/P (MAP) Pulse Ox O2 Delivery O2 Flow Rate FiO2


 


3/31/18 07:37 36.4 68 16 195/99 (131) 96 Room Air  


 


3/31/18 05:30  50  140/80 (100)  Room Air  


 


3/31/18 04:00     96 Room Air  


 


3/31/18 03:53 36.7 65 16 157/83 (107) 94 Room Air  


 


3/31/18 00:00     95 Room Air  


 


3/30/18 23:53  56 18 152/85 (107) 98 Room Air  


 


3/30/18 23:06 36.8 64 20 172/94 (120) 97 Room Air  


 


3/30/18 20:00     95 Room Air  


 


3/30/18 19:36 36.8 70 20 163/88 (113) 95 Room Air  


 


3/30/18 16:15 36.6 61 22 166/91 (116) 95 Room Air  


 


3/30/18 16:00      Room Air  


 


3/30/18 12:00      Room Air  


 


3/30/18 11:08 37.0 70 22 162/94 (116) 95   











Physical Exam


General Appearance:  no apparent distress, + cachetic, + thin


Eyes:  normal inspection


ENT:  hearing grossly normal


Respiratory/Chest:  no respiratory distress, no accessory muscle use, + 

decreased breath sounds (bilateral bases)


Cardiovascular:  regular rate, rhythm, no edema, no murmur


Abdomen:  normal bowel sounds, non tender, soft


Extremities:  normal inspection, no pedal edema, + pertinent finding (tender LLE

)


Neurologic/Psychiatric:  no motor/sensory deficits, alert, normal mood/affect





Laboratory Results





Last 24 Hours








Test


  3/30/18


11:16 3/30/18


16:17 3/30/18


20:55 3/31/18


05:24


 


Bedside Glucose 154 mg/dl  183 mg/dl  177 mg/dl  


 


White Blood Count    15.82 K/uL 


 


Red Blood Count    4.27 M/uL 


 


Hemoglobin    11.9 g/dL 


 


Hematocrit    35.4 % 


 


Mean Corpuscular Volume    82.9 fL 


 


Mean Corpuscular Hemoglobin    27.9 pg 


 


Mean Corpuscular Hemoglobin


Concent 


  


  


  33.6 g/dl 


 


 


RDW Standard Deviation    42.9 fL 


 


RDW Coefficient of Variation    14.5 % 


 


Platelet Count    600 K/uL 


 


Mean Platelet Volume    10.3 fL 


 


Activated Partial


Thromboplast Time 


  


  


  31.6 SECONDS 


 


 


Partial Thromboplastin Ratio    1.2 


 


Sodium Level    141 mmol/L 


 


Potassium Level    3.5 mmol/L 


 


Chloride Level    107 mmol/L 


 


Carbon Dioxide Level    28 mmol/L 


 


Anion Gap    6.0 mmol/L 


 


Blood Urea Nitrogen    13 mg/dl 


 


Creatinine    0.60 mg/dl 


 


Est Creatinine Clear Calc


Drug Dose 


  


  


  79.5 ml/min 


 


 


Estimated GFR (


American) 


  


  


  114.8 


 


 


Estimated GFR (Non-


American 


  


  


  99.1 


 


 


BUN/Creatinine Ratio    21.4 


 


Random Glucose    47 mg/dl 


 


Calcium Level    8.3 mg/dl 


 


Test


  3/31/18


06:44 


  


  


 


 


Bedside Glucose 53 mg/dl    











Assessment and Plan


This is a 62 year old female with no past medical history prior to arrival.





Metabolic Encephalopathy


Bilateral Cerebellar Hemisphere Infarcts


3/31


- neurology has signed off


- currently heparin ggt on hold due to thoracentesis


- will restart IV Heparin after tap when okay with cardiothoracic surgery


- eventual plan to place her on Eliquis





3/30


- appreciate neurology input


- will need continued PT/OT/speech evaluations


- currently on IV heparin; will transition to oral anticoagulant prior to 

discharge





3/29


- Brain MRI suggests bilateral cerebellar infarcts


- appreciate neurology evaluation


- currently on IV heparin


- will continue speech/PT/OT


- clinically improving





3/28


- Head CT performed late on 3/27, suggesting possible subacute infarct


- Brain MRI could not be performed; dental implant?


- repeat Head CT in 1-2 days


- appreciate neurology input


- started on IV heparin at this time due to pulling of NGT and failing swallow 

evaluation


- speech eval


- PT/OT





3/27


- patient with confusion, disorientation


- possibly related to Dilaudid use, also has had sepsis, hypotension episodes, 

BERNABE earlier in the admission


- appreciate neurology input on this matter, no need for further imaging


- Brain MRI, Head CT already performed, EEG reviewed by neurology


- slow improvement


- may need Ativan for significant anxiety as well





Severe Sepsis, E. coli Septicemia


Severe C. Diff Colitis - improving


3/31


- white count continues to improve


- continue Rocephin plus PO Vanco





3/30


- white count improving to 16k from 19k


- will need Rocephin x14 days from last negative blood culture (day #8/14)


- for the C. Diff patient is on oral Vanco and IV Flagyl; will d/c Flagyl in AM 

(3/31)





3/29


- decrease in WBC count today


- continue Rocephin for 14 days after the last negative blood culture


- continue oral Vanco for the C. Diff


- labial fold swelling noted; possible Bartholin abscess? - consulted OB/gyn 

and added warm compresses to the region





3/28


- WBC increasing


- will repeat abdomen/pelvis CT


- general surgery for further input


- continue IV Rocephin; unfortunately, pulled NGT, so can't do oral Vanco, 

currently on IV Flagyl





3/27


- blood cultures positive for E. coli; repeat blood cultures - no growth


- C. Diff positive, colitis noted on imaging


- started on IV Flagyl initially, converted to PO Vanco


- appreciate ID input


- will need PO Vanco for 28 days total


- will need Rocephin for 14 days since the negative blood culture on 3/22


- appreciate general surgery input regarding C. Diff - no surgical intervention 

at this time


- will monitor abdominal firmness/distention - if no improvement, repeat abd/

pelvis CT can be done





Paroxysmal A. Fib with RVR - resolved


3/31


- continue b-blocker, currently NSR





3/30


- currently in NSR, rate controlled with b-blocker


- IV heparin ggt at this time





3/29


- currently on PO Metoprolol as per cardiology, appreciate input


- IV heparin at this time





3/28


- Eliquis stopped - awaiting speech evaluation


- IV heparin started


- will need either IV Lopressor or re-insertion of NGT





3/27


- intermittent A. fib and rapid ventricular response; likely from sepsis


- currently in NSR


- appreciate cardiology input - continue Lopressor for rate control


- Eliquis for anticoagulation


- had required IV Lopressor and Cardizem ggt - no longer should receive 

Cardizem due to pauses noted earlier in the admission





Biventricular Heart Failure


Bilateral Pleural Effusions


3/31


- plan for thoracentesis


- intermittent Lasix may be needed


- as outpatient, should start ACE-I





3/30


- will consult pulm regarding bilateral pleural effusion and possible need for 

thoracentesis


- appreciate cardiology input - intermittently receiving Lasix, received some 

last evening





3/29


- persistent bilateral pleural effusions


- pulmonary consulted for possible thoracentesis on one side





3/28


- required IV Lasix yesterday


- monitor fluid status





3/27


- patient presented with worsening lower extremity edema, hepatic congestion


- echo initially suggested significant R ventricular systolic function and 

decreased LVEF


- started on b-blocker, treated for infection, etc.


- repeat echo was performed on 3/26 with improvement of both ventricular 

function


- further management as per cardiology





Type 2 Demand Ischemia


- troponin elevation when she first came in - likely secondary to infection, 

anemia, etc.


- initially was on IV heparin, now back on it


- now off of Eliquis


- continue b-blocker


- treating underlying infection





Hypertensive Urgency - improving


3/30


- appreciate cardio input regarding blood pressure


- on nitro paste and Hydralazine as well as b-blocker


- will increase hydralazine dose at this time





3/29


- appreciate cardiology input


- currently attempting nitro paste and hydralazine for blood pressure control


- holding off on ACE-I in the short term as per nephrology





3/28


- BP yesterday >200/100


- appreciate cardiology input


- started on Lopressor 50mg q6, Hydralazine and topical nitrates added


- anxiety may also be playing a part in elevated blood pressure


- will add low dose Ativan PRN





Hyperglycemia - resolved


Hypoglycemia


3/31


- will likely need metformin on discharge due to multiple co-morbidities and 

Ha1c of 6.5%





3/30


- off of D5 fluid


- sugars are improving and within goal range


- appreciate pharmacy input





3/29


- blood sugar is now on the lower end


- trying low dose D5, monitor blood sugars, should improve as she is eating now





3/28


- likely due to dextrose in tube feeds


- consult speech


- consult pharmacy glycemic control





Acute Kidney Injury - resolved


- resolved now; initially due to acute tubular necrosis, possible medication or 

IV contrast induced


- was given IVFs due to sepsis





Hepatic Congestion


- from biventricular heart failure


- monitor for fluid overload





Anemia - improving


- likely iron deficiency


- H/H stable





Thrombocytopenia - resolved





Abnormal Thyroid Function Test


- possibly due to acute illness, severe sepsis, etc.


- initially on methimazole; now d/c'd


- will need outpatient TSH, Free T4, T3; may need thyroid ultrasound and 

further testing


- outpatient endocrinology input





DVT ppx


- IV heparin





FULL CODE





PT/OT/speech ordered














Clinical course obtained from chart review:





March 19, 2018


Presented on 3/19 with significant bilateral lower extremity edema, fevers, 

chills, abdominal distention, nausea.





March 20, 2018


Patient admitted on 3/20 due to meeting severe sepsis criteria. She had 

significant lactic acidosis, tachycardia; later developed hypotension, 

leukocytosis, and elevated procalcitonin.


She was started on broad spectrum antibiotics, initially to cover cellulitis.





Imaging suggested shock bowel, hypotensive bowel - initial concern of liver 

cirrhosis. Further testing suggested significant R heart failure with hepatic 

congestion. Edematous lower extremities likely from the biventricular heart 

failure noted. Echo was done to confirm this diagnosis. LVEF at the time noted 

to be 35-40%. Dilatation of R atrium and R ventricle noted as well. Late on 3/20

, patient developed A. Fib with RVR and transferred to the ICU.





March 21, 2018


Patient spontaneously converted to sinus rhythm after being given IV Lopressor 

x2 and Cardizem ggt, which had to be stopped due to low blood pressure.


Troponin elevation noted, likely type 2 demand ischemia from infection, 

arrhythmia, sepsis.


Patient was started on IV heparin.





Patient was found to be positive for C. Diff Infection; CT suggested colitis. 

NGT was placed in the ICU and Vanco solution was given to treat this.


Blood cultures were positive at this time for gram negative organism.





Patient's mental status acutely worsened. Neurology was consulted and an MRI 

and EEG were performed. Attributed mental status change to metabolic 

encephalopathy.





Kidney function also acutely worsened; possibly from IV contrast from imaging 

done prior.





March 22, 2018


Nephrology consulted due to acute kidney injury and acute tubular necrosis. 

Consideration made for dialysis; though this was never required.


ID consulted - E. coli septicemia noted on blood cultures x2, repeat cultures 

were ordered.


Nephrology consulted - metabolic acidosis - possibly from sepsis and BERNABE.





March 23, 2018


General surgery consulted regarding C. Diff colitis, conservative management 

decided.


Patient at this time was going in and out of A. fib; initially started on IV 

Cardizem, but due to post-conversion pauses, this was stopped.


Was started on bicarb drip due to acidosis.


ID following for UTI, C. Diff colitis, E. coli septicemia.





March 24, 2018


Clinical improvement made on March 24 mentally.


Hypertension was still an issue and cardiology worked on getting this down by 

increasing b-blocker dose and adding hydralazine.





March 25, 2018


continuing PO Vancomycin and IV Rocephin


IV heparin stopped at this time. Eliquis had been started.


clinical picture continued to improve as per records.





March 26, 2018


Repeat TTE was done; improving biventricular heart failure noted.


Hypertensive Urgency still an issue on this day; cardiology adjusted meds to 

add nitropaste and Hydralazine, as well as an increase in Lopressor dose.
Patient

## 2018-04-03 NOTE — DISCHARGE SUMMARY
Discharge Summary


Date of Service


Apr 3, 2018.





Discharge Summary


Admission Date:


Mar 20, 2018 at 02:36


Discharge Date:  Apr 3, 2018


Discharge Disposition:  Rehab


Principal Diagnosis:


Biventricular Heart Failure


Bilateral Pleural Effusions - improving





Metabolic Encephalopathy - resolved





Bilateral Cerebellar Hemisphere Infarcts





Severe Sepsis, E. coli Septicemia


Severe C. Diff Colitis - improving





Paroxysmal A. Fib with RVR - resolved





Type 2 Demand Ischemia





Hypertensive Urgency - improving





New Onset Diabetes Mellitus, type 2





Acute Kidney Injury - resolved





Hepatic Congestion - improving





Anemia - improving





Thrombocytopenia - resolved





Abnormal Thyroid Function Test


Hyperthyroidism





Vitamin D deficiency





Medication Reconciliation


New Medications:  


Ceftriaxone Sod (Rocephin) 1 Gm Inj


2 GM IV DAILY for 2 Days, #4 VIAL





Metformin Hcl Er (Glucophage Er) 500 Mg Tab


500 MG PO BID for 30 Days, #60 TAB 1 Refill





Vancomycin Hcl (Vancomycin) 250 Mg Cap


500 MG PO QID for 14 Days, #112 CAP





Apixaban (Eliquis) 5 Mg Tab


5 MG PO BID for 30 Days, #60 TAB





Atorvastatin (Lipitor) 40 Mg Tab


40 MG PO QAM for 30 Days, #30 TAB 2 Refills





Cholecalciferol (Vitamin D3) 1,000 Inter.unit Tab


1000 INTER.UNIT PO QAM for 30 Days, #30 TAB 2 Refills





Furosemide (Furosemide) 20 Mg Tab


20 MG PO MoWeFr@0900 for 30 Days, #10 TAB





Hydralazine Hcl (Apresoline) 50 Mg Tab


1 TAB PO TID for 30 Days, #90 TAB 1 Refill





Isosorbide Mononitrate (Isosorbide Mononitrate ER) 30 Mg Tabcr


60 MG PO QAM for 30 Days, #60 TABS 2 Refills





Methimazole (Methimazole) 5 Mg Tab


5 MG PO TID for 30 Days, #90 TAB 2 Refills





Metoprolol Tartrate (Lopressor) (Lopressor) 50 Mg Tab


50 MG PO BID for 30 Days, #60 TAB 1 Refill





Pantoprazole (Pantoprazole Sodium) 40 Mg Tab


40 MG PO QAM for 30 Days, #30 TAB 2 Refills











Admission Information


HPI (per Admitting provider):


DATE OF ADMISSION:  03/20/2018


 


PRIMARY CARE PHYSICIAN:  No primary care doctor.


 


CHIEF COMPLAINT:  Left leg pain, swelling, abdominal pain, nausea and


vomiting.


 


HISTORY OF PRESENT ILLNESS:  





History obtained from patient and records.  





Medical history significant for anemia.  





Patient is a resident of [place] who has been staying with her daughter in [place] 


the last few months.  





She noted left leg swelling yesterday, achy abdominal pain all over, belly more


distended than usual.  Some nausea,  achy headache


symptoms.  No chest pain, increasing shortness of breath.  


Denies diarrhea.


No cough symptoms.


No recent trauma.





Patient brought to the Emergency Room.  


Received Zosyn, Daptomycin and Clindamycin for sepsis. 


 


MEDICAL HISTORY:  As above.


 


SURGERIES:  None as per the patient.


 


HOME MEDICATIONS:  Just vitamin D supplements.


 


ALLERGIES:  SULFA.


 


FAMILY HISTORY:  Hypertension.


 


PERSONAL AND SOCIAL HISTORY:  Nonsmoker, no chronic intake of alcoholic


beverages.  Retired home health aide.  -American ethnicity.


 


REVIEW OF SYSTEMS:  As per HPI, all 10 systems reviewed, all other ROS


negative.


 


PHYSICAL EXAMINATION:


VITAL SIGNS:  Blood pressure was noted to be 159/102, later 100/70, pulse


rate 110 later 90, RR 18, temperature 37.6 O2 98RA


GENERAL:  Noted to be hyposthenic, slightly anxious, no respiratory distress. 


SKIN: Pallor, warm.


HEENT:  Pale palpebral conjunctiva.  Possible exophthalmos.  Dry mucosa.


NECK:  Supple, short.


CHEST:  Decreased effort.  No tenderness.


HEART:  RRR, no murmur.


ABDOMEN:  Some distention, mild tenderness on light palpation .


EXTREMITIES:  LLE edema/induration,  tenderness.  


NEUROLOGIC:  Coherent.  no gross focality .


 


LABORATORY DATA:  Hemoglobin noted to be 11.3, hematocrit 31.7, white cell


count 4.3, platelet count 128.  Sodium noted to be 136, potassium 3.5,


chloride 101, CO2 of 23, BUN 20, creatinine 0.9, glucose 99.  Lactic acid was


noted to be 3.7.  AST 44, ALT 32.


 


CT head, initially read, no acute pathology 





CTA, initial read, no PE, pulmonary hypertension, right heart enlargement, 

anasarca, periportal edema,


fluid overload.  





CT abdomen and pelvis initial read : mild nodularity


of liver contour, cirrhosis, small to moderate amount of ascites,


distended IVC, colonic diverticulosis without evidence of acute


diverticulitis, most colon nondistended, possible


colitis, enteritis, enlarged retroperitoneal nodes.  





CT lower leg, small amount of pelvic fluid, fat stranding left thigh about the 

left knee.  


No abscess or soft tissue gas.


 


EKG as per my interpretation, rate 105, sinus tachycardia, biatrial enlargement

, short OH,


no ischemia








ASSESSMENT:


1.  Severe sepsis


SIRS plus lactic acidosis


possible sources :


left lower extremity cellulitis


GI (colitis, SBP-ascites on CT)


ro UTI





2.  new diagnosis of cirrhosis.


possibly from RSHF, passive congestion (unknown duration)


3.  Hyperthyroidism (possible Graves disease) new diagnosis


4.  Chronic anemia, unknown baseline.


5.  Thrombocytopenia secondary to sepsis, cirrhosis.


  





PLAN:  


GMF


cultures, stool C. diff. 


ff UA 


Doxycycline for cellulitis


Cefepime and Flagyl for abdominal sepsis


IVF,  follow lactic acid 


diagnostic/therapeutic paracentesis


GI consult.  Ascites.  new diagnosis of cirrhosis.  


Initiate Methimazole outpatient Endocrinology follow-up


Anemia workup.  


DVT prophylaxis, SCDs RE thrombocytopenia.  


Full code.  





Attempted to update the patient's daughter, Sid Byrd 


over listed contact number ([phone]).


No response.





Hospital Course


This is a 62 year old female with no past medical history prior to arrival.





Biventricular Heart Failure


Bilateral Pleural Effusions - improving


4/3


- Lasix every other day for the pleural effusions and the heart failure


- continue Lopressor


- as outpatient start ACE-I on 4/18 4/2


- continue Lasix as needed


- outpatient ACE-I in a few weeks





4/1


- thoracentesis canceled; will continue Lasix


- will likely need Lasix 20mg every other day as outpatient


- appreciate cardiology input


- as outpatient begin ACE-I





 3/31 - plan for thoracentesis - intermittent Lasix may be needed - as 

outpatient, should start ACE-I


3/30 - will consult pulm regarding bilateral pleural effusion and possible need 

for thoracentesis - appreciate cardiology input - intermittently receiving Lasix

, received some last evening


3/29 - persistent bilateral pleural effusions - pulmonary consulted for 

possible thoracentesis on one side


3/28 - required IV Lasix yesterday - monitor fluid status


3/27 - patient presented with worsening lower extremity edema, hepatic 

congestion - echo initially suggested significant R ventricular systolic 

function and decreased LVEF - started on b-blocker, treated for infection, etc. 

- repeat echo was performed on 3/26 with improvement of both ventricular 

function - further management as per cardiology 





Metabolic Encephalopathy - resolved


Bilateral Cerebellar Hemisphere Infarcts


4/3


- patient to be on Eliquis 2.5 mg BID for A. Fib and CVA


- continue Lipitor as outpatient





4/2


- transition to Eliquis when okay with cardiology


- started Lipitor





4/1


- continue IV heparin for now


- transition to Eliquis prior to discharge


- PT/OT


- outpatient neurology input





 3/31 - neurology has signed off - currently heparin ggt on hold due to 

thoracentesis - will restart IV Heparin after tap when okay with cardiothoracic 

surgery - eventual plan to place her on Eliquis


3/30 - appreciate neurology input - will need continued PT/OT/speech 

evaluations - currently on IV heparin; will transition to oral anticoagulant 

prior to discharge


3/29 - Brain MRI suggests bilateral cerebellar infarcts - appreciate neurology 

evaluation - currently on IV heparin - will continue speech/PT/OT - clinically 

improving


3/28 - Head CT performed late on 3/27, suggesting possible subacute infarct - 

Brain MRI could not be performed; dental implant? - repeat Head CT in 1-2 days 

- appreciate neurology input - started on IV heparin at this time due to 

pulling of NGT and failing swallow evaluation - speech eval - PT/OT


3/27 - patient with confusion, disorientation - possibly related to Dilaudid use

, also has had sepsis, hypotension episodes, BERNABE earlier in the admission - 

appreciate neurology input on this matter, no need for further imaging - Brain 

MRI, Head CT already performed, EEG reviewed by neurology - slow improvement - 

may need Ativan for significant anxiety as well 





Severe Sepsis, E. coli Septicemia


Severe C. Diff Colitis - improving


4/3


- Rocephin on 4/4 and 4/5 then stop


- PO Vanco until April 18 4/2


- continue Rocephin; will need until 4/4


- continue PO Vancomycin for a total of 28 days





4/1


- continue Rocephin for the E. coli septicemia (day #10/14)


- continue PO Vancomycin for C. diff - continue for a total of 28 days





 3/31 - white count continues to improve - continue Rocephin plus PO Vanco


3/30 - white count improving to 16k from 19k - will need Rocephin x14 days from 

last negative blood culture (day #9/14) - for the C. Diff patient is on oral 

Vanco and IV Flagyl; will d/c Flagyl in AM (3/31)


3/29 - decrease in WBC count today - continue Rocephin for 14 days after the 

last negative blood culture - continue oral Vanco for the C. Diff - labial fold 

swelling noted; possible Bartholin abscess? - consulted OB/gyn and added warm 

compresses to the region


3/28 - WBC increasing - will repeat abdomen/pelvis CT - general surgery for 

further input - continue IV Rocephin; unfortunately, pulled NGT, so can't do 

oral Vanco, currently on IV Flagyl


3/27 - blood cultures positive for E. coli; repeat blood cultures - no growth - 

C. Diff positive, colitis noted on imaging - started on IV Flagyl initially, 

converted to PO Vanco - appreciate ID input - will need PO Vanco for 28 days 

total - will need Rocephin for 14 days since the negative blood culture on 3/22 

- appreciate general surgery input regarding C. Diff - no surgical intervention 

at this time - will monitor abdominal firmness/distention - if no improvement, 

repeat abd/pelvis CT can be done 





Paroxysmal A. Fib with RVR - resolved


4/3


- currently NSR


- continue Eliquis and Lopressor





4/1


- continue IV heparin, transition to Eliquis


- continue Lopressor





 3/31 - continue b-blocker, currently NSR


3/30 - currently in NSR, rate controlled with b-blocker - IV heparin ggt at 

this time


3/29 - currently on PO Metoprolol as per cardiology, appreciate input - IV 

heparin at this time


3/28 - Eliquis stopped - awaiting speech evaluation - IV heparin started - will 

need either IV Lopressor or re-insertion of NGT


3/27 - intermittent A. fib and rapid ventricular response; likely from sepsis - 

currently in NSR - appreciate cardiology input - continue Lopressor for rate 

control - Eliquis for anticoagulation - had required IV Lopressor and Cardizem 

ggt - no longer should receive Cardizem due to pauses noted earlier in the 

admission 





Type 2 Demand Ischemia


4/3


- added statin, continue b-blocker, Imdur





4/1


- continue Imdur, b-blocker, likely would benefit from statin addition prior to 

discharge





- troponin elevation when she first came in - likely secondary to infection, 

anemia, etc.


- initially was on IV heparin, now back on it


- now off of Eliquis


- continue b-blocker


- treating underlying infection





Hypertensive Urgency - improving


4/3


- added methimazole


- Hydralazine, Imdur, Metoprolol





4/1


- blood pressure continues to be elevated


- likely from hyperthyroid


- starting methimazole


- continue Hydralazine, Imdur, Metoprolol





3/30


- appreciate cardio input regarding blood pressure


- on nitro paste and Hydralazine as well as b-blocker


- will increase hydralazine dose at this time





3/29


- appreciate cardiology input


- currently attempting nitro paste and hydralazine for blood pressure control


- holding off on ACE-I in the short term as per nephrology





3/28


- BP yesterday >200/100


- appreciate cardiology input


- started on Lopressor 50mg q6, Hydralazine and topical nitrates added


- anxiety may also be playing a part in elevated blood pressure


- will add low dose Ativan PRN





Hyperglycemia - resolved


Hypoglycemia


4/3


- metformin started


- add ACE-I as outpatient


- monitor kidneys





4/1


- Ha1c = 6.5%


- due to recent stroke and Type 2 MI, will need metformin prior to discharge


- add statin


- add ACE-I as outpatient





 3/31 - will likely need metformin on discharge due to multiple co-morbidities 

and Ha1c of 6.5%


3/30 - off of D5 fluid - sugars are improving and within goal range - 

appreciate pharmacy input


3/29 - blood sugar is now on the lower end - trying low dose D5, monitor blood 

sugars, should improve as she is eating now


3/28 - likely due to dextrose in tube feeds - consult speech - consult pharmacy 

glycemic control


 


Acute Kidney Injury - resolved


- resolved now; initially due to acute tubular necrosis, possible medication or 

IV contrast induced


- was given IVFs due to sepsis





Hepatic Congestion


- from biventricular heart failure


- monitor for fluid overload





Anemia - improving


- likely iron deficiency


- H/H stable





Thrombocytopenia - resolved





Abnormal Thyroid Function Test


- TSH continues to be low on 4/1


- Free T4 is now low, initially was elevated


- as per patient's daughter, who is a physician, patient's record indicate she 

has had issues to hyperthyroid in the past


- will restart methimazole 5mg TID


- may need thyroid ultrasound, thyroid scan as outpatient


- outpatient endocrinology input





Vitamin D deficiency


- started on Vitamin D supplementation, repeat Vitamin D levels in 3 months





DVT ppx


- Eliquis





FULL CODE





PT/OT/speech ordered














Clinical course obtained from chart review:





March 19, 2018


Presented on 3/19 with significant bilateral lower extremity edema, fevers, 

chills, abdominal distention, nausea.





March 20, 2018


Patient admitted on 3/20 due to meeting severe sepsis criteria. She had 

significant lactic acidosis, tachycardia; later developed hypotension, 

leukocytosis, and elevated procalcitonin.


She was started on broad spectrum antibiotics, initially to cover cellulitis.





Imaging suggested shock bowel, hypotensive bowel - initial concern of liver 

cirrhosis. Further testing suggested significant R heart failure with hepatic 

congestion. Edematous lower extremities likely from the biventricular heart 

failure noted. Echo was done to confirm this diagnosis. LVEF at the time noted 

to be 35-40%. Dilatation of R atrium and R ventricle noted as well. Late on 3/20

, patient developed A. Fib with RVR and transferred to the ICU.





March 21, 2018


Patient spontaneously converted to sinus rhythm after being given IV Lopressor 

x2 and Cardizem ggt, which had to be stopped due to low blood pressure.


Troponin elevation noted, likely type 2 demand ischemia from infection, 

arrhythmia, sepsis.


Patient was started on IV heparin.





Patient was found to be positive for C. Diff Infection; CT suggested colitis. 

NGT was placed in the ICU and Vanco solution was given to treat this.


Blood cultures were positive at this time for gram negative organism.





Patient's mental status acutely worsened. Neurology was consulted and an MRI 

and EEG were performed. Attributed mental status change to metabolic 

encephalopathy.





Kidney function also acutely worsened; possibly from IV contrast from imaging 

done prior.





March 22, 2018


Nephrology consulted due to acute kidney injury and acute tubular necrosis. 

Consideration made for dialysis; though this was never required.


ID consulted - E. coli septicemia noted on blood cultures x2, repeat cultures 

were ordered.


Nephrology consulted - metabolic acidosis - possibly from sepsis and BERNABE.





March 23, 2018


General surgery consulted regarding C. Diff colitis, conservative management 

decided.


Patient at this time was going in and out of A. fib; initially started on IV 

Cardizem, but due to post-conversion pauses, this was stopped.


Was started on bicarb drip due to acidosis.


ID following for UTI, C. Diff colitis, E. coli septicemia.





March 24, 2018


Clinical improvement made on March 24 mentally.


Hypertension was still an issue and cardiology worked on getting this down by 

increasing b-blocker dose and adding hydralazine.





March 25, 2018


continuing PO Vancomycin and IV Rocephin


IV heparin stopped at this time. Eliquis had been started.


clinical picture continued to improve as per records.





March 26, 2018


Repeat TTE was done; improving biventricular heart failure noted.


Hypertensive Urgency still an issue on this day; cardiology adjusted meds to 

add nitropaste and Hydralazine, as well as an increase in Lopressor dose.


Total time spent on discharge = 60 minutes


This includes examination of the patient, discharge planning, medication 

reconciliation, and communication with other providers.





Discharge Instructions


Please follow-up with primary care physician after stay at [facility].


Please follow-up with cardiology for your atrial fibrillation (irregular heart 

rhythm), heart failure, and blood pressure.


Please follow-up with neurology regarding the stroke.


Please follow-up with endocrinology regarding the overactive thyroid.





Will be on Rocephin 2grams (antibiotic) once daily via IV on April 4 and April 5

, then stop.


You will be on Vancomycin (tablet) four times daily for stomach infection (C. 

Diff) until April 18 (last doses on April 17).





You will be started on Eliquis (blood thinner) for stroke and Atrial 

fibrillation (irregular heart rhythm).


You will be started on medications to protect your heart and blood pressure (

Hydralazine, Imdur, Metoprolol).


You will be started on Lasix 20mg on Monday, Wednesday, Friday - to prevent 

fluid overload of the lungs.


You will be started on metformin for diabetes.


You will be started on Lipitor (cholesterol medication) because of stroke, 

diabetes and heart protection.





Should start on low dose Lisinopril in 1-2 weeks if kidney function is okay; 

for blood pressure, heart failure, and diabetes.





Repeat TSH in 4-6 weeks to check thyroid medications.


Repeat Vitamin D levels in 3 months.


Repeat Ha1c in 3 months; should have urinary microalbuminuria in 3 months.





Repeat CBC and PRP in 1 week.

## 2018-04-06 ENCOUNTER — HOSPITAL ENCOUNTER (OUTPATIENT)
Dept: HOSPITAL 45 - C.ULTR | Age: 63
Discharge: HOME | End: 2018-04-06
Attending: ORTHOPAEDIC SURGERY
Payer: COMMERCIAL

## 2018-04-06 DIAGNOSIS — M79.89: Primary | ICD-10-CM

## 2018-04-06 NOTE — DIAGNOSTIC IMAGING REPORT
ULTRASOUND L VENOUS DOPP LOWER EXT UNILAT 



CLINICAL HISTORY: Left leg pain and swelling    



COMPARISON STUDY:  March 20, 2018 



FINDINGS: Real-time and color flow Doppler imaging were performed. Flow was seen

within the femoral, popliteal and calf veins with no intraluminal thrombus

demonstrated. The saphenous vein is patent.



IMPRESSION: No evidence of left lower extremity DVT







Electronically signed by:  Rick Benavidez M.D.

4/6/2018 3:53 PM



Dictated Date/Time:  4/6/2018 3:52 PM

## 2018-04-06 NOTE — DIAGNOSTIC IMAGING REPORT
L ANKLE MIN 3 VIEWS ROUTINE



CLINICAL HISTORY: 62 years-old Female presenting with SOFT TISSUE DISORDERS. 



TECHNIQUE: Frontal, mortise, and lateral views of the left ankle were obtained. 



COMPARISON: 3/31/2018.



FINDINGS:

Osteopenia suspected. Ankle mortise intact. No acute fracture or malalignment.

Mild diffuse soft tissue swelling noted. No advanced degenerative change.



IMPRESSION:

Allowing for osteopenia, no evidence of acute osseous injury or advanced

degenerative change.







Electronically signed by:  Tay Koch M.D.

4/6/2018 4:30 PM



Dictated Date/Time:  4/6/2018 4:29 PM

## 2018-05-17 ENCOUNTER — HOSPITAL ENCOUNTER (OUTPATIENT)
Dept: HOSPITAL 45 - C.LABWYN | Age: 63
Discharge: HOME | End: 2018-05-17
Attending: PHARMACIST
Payer: COMMERCIAL

## 2018-05-17 DIAGNOSIS — I48.91: Primary | ICD-10-CM

## 2018-05-17 LAB — INR PPP: 1.1 (ref 0.9–1.1)

## 2018-05-18 NOTE — CODING QUERY NO DIAGNOSIS
TREATMENT RENDERED WITHOUT A DIAGNOSIS                                                  



To promote full compliance with coding requirements relating to patient care, physician 
participation is requested in all cases of  uncertainty.  Please assist us with 
providing a diagnosis/symptom for the test(s) below:



A diagnosis/symptom was not documented on your Order.  A valid diagnosis/symptom is 
required to bill all insurances.



**Please remember that we are unable to code a diagnosis of rule out, probable, possible, 
questionable, or suspected.  



Tests that require a diagnosis:

DOS: 5/17/18

* PT/INR      DIAGNOSIS:











Provider Signature: _____________________________ Date: _________



Thank you  

Aleshia Hammer

Health Information Management

Phone:  [phone]

Fax:  [phone]



Once completed, please kindly fax back to [phone]



For questions please call [phone]

## 2018-05-24 ENCOUNTER — HOSPITAL ENCOUNTER (OUTPATIENT)
Dept: HOSPITAL 45 - C.LABWYN | Age: 63
Discharge: HOME | End: 2018-05-24
Attending: PHARMACIST
Payer: COMMERCIAL

## 2018-05-24 DIAGNOSIS — Z79.01: Primary | ICD-10-CM

## 2018-05-24 LAB — INR PPP: 1.1 (ref 0.9–1.1)

## 2018-05-26 ENCOUNTER — HOSPITAL ENCOUNTER (EMERGENCY)
Dept: HOSPITAL 45 - C.EDB | Age: 63
Discharge: HOME | End: 2018-05-26
Payer: COMMERCIAL

## 2018-05-26 VITALS — OXYGEN SATURATION: 98 % | DIASTOLIC BLOOD PRESSURE: 76 MMHG | SYSTOLIC BLOOD PRESSURE: 115 MMHG | HEART RATE: 62 BPM

## 2018-05-26 VITALS
HEIGHT: 64.02 IN | WEIGHT: 107.14 LBS | WEIGHT: 107.14 LBS | BODY MASS INDEX: 18.29 KG/M2 | HEIGHT: 64.02 IN | BODY MASS INDEX: 18.29 KG/M2

## 2018-05-26 VITALS — TEMPERATURE: 97.88 F

## 2018-05-26 DIAGNOSIS — R10.30: Primary | ICD-10-CM

## 2018-05-26 DIAGNOSIS — Z79.01: ICD-10-CM

## 2018-05-26 DIAGNOSIS — Z88.2: ICD-10-CM
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
## 2018-05-26 DIAGNOSIS — Z51.81: ICD-10-CM

## 2018-05-26 DIAGNOSIS — S30.1XXA: ICD-10-CM

## 2018-05-26 LAB
ALBUMIN SERPL-MCNC: 3.3 GM/DL (ref 3.4–5)
ALP SERPL-CCNC: 160 U/L (ref 45–117)
ALT SERPL-CCNC: 24 U/L (ref 12–78)
AST SERPL-CCNC: 27 U/L (ref 15–37)
BASOPHILS # BLD: 0.02 K/UL (ref 0–0.2)
BASOPHILS NFR BLD: 0.3 %
BUN SERPL-MCNC: 18 MG/DL (ref 7–18)
CALCIUM SERPL-MCNC: 8.4 MG/DL (ref 8.5–10.1)
CK MB SERPL-MCNC: 5.2 NG/ML (ref 0.5–3.6)
CO2 SERPL-SCNC: 29 MMOL/L (ref 21–32)
CREAT SERPL-MCNC: 0.99 MG/DL (ref 0.6–1.2)
EOS ABS #: 0.18 K/UL (ref 0–0.5)
EOSINOPHIL NFR BLD AUTO: 186 K/UL (ref 130–400)
GLUCOSE SERPL-MCNC: 85 MG/DL (ref 70–99)
HCT VFR BLD CALC: 32.9 % (ref 37–47)
HGB BLD-MCNC: 10.7 G/DL (ref 12–16)
IG#: 0 K/UL (ref 0–0.02)
IMM GRANULOCYTES NFR BLD AUTO: 37.1 %
INR PPP: 1.6 (ref 0.9–1.1)
LIPASE: 346 U/L (ref 73–393)
LYMPHOCYTES # BLD: 2.6 K/UL (ref 1.2–3.4)
MCH RBC QN AUTO: 28.2 PG (ref 25–34)
MCHC RBC AUTO-ENTMCNC: 32.5 G/DL (ref 32–36)
MCV RBC AUTO: 86.8 FL (ref 80–100)
MONO ABS #: 0.51 K/UL (ref 0.11–0.59)
MONOCYTES NFR BLD: 7.3 %
NEUT ABS #: 3.69 K/UL (ref 1.4–6.5)
NEUTROPHILS # BLD AUTO: 2.6 %
NEUTROPHILS NFR BLD AUTO: 52.7 %
PMV BLD AUTO: 10.1 FL (ref 7.4–10.4)
POTASSIUM SERPL-SCNC: 4.2 MMOL/L (ref 3.5–5.1)
PROT SERPL-MCNC: 8.1 GM/DL (ref 6.4–8.2)
PTT PATIENT: 47.2 SECONDS (ref 21–31)
RED CELL DISTRIBUTION WIDTH CV: 15.9 % (ref 11.5–14.5)
RED CELL DISTRIBUTION WIDTH SD: 50.4 FL (ref 36.4–46.3)
SODIUM SERPL-SCNC: 139 MMOL/L (ref 136–145)
WBC # BLD AUTO: 7 K/UL (ref 4.8–10.8)

## 2018-05-26 NOTE — DIAGNOSTIC IMAGING REPORT
CT OF THE ABDOMEN AND PELVIS WITHOUT CONTRAST



CLINICAL HISTORY: RLQ pain, patient refusing contrast, recent Lovenox shots in

abdomen.    



COMPARISON STUDY:  CT of the abdomen and pelvis March 28, 2018. 



TECHNIQUE: Axial images of the abdomen and pelvis were obtained without IV

contrast. Images were reviewed in the axial, sagittal, and coronal planes.  A

dose lowering technique was utilized adhering to the principles of ALARA.





FINDINGS: The heart is moderately enlarged. Bilateral pleural effusions and

associated bibasilar opacity shown on exam of March 28, 2018 have resolved.

Evaluation of the abdomen and pelvis is suboptimal on this unenhanced exam. The

liver, spleen, adrenal glands and pancreas are unremarkable. There is no

hydronephrosis. There is no biliary or pancreatic ductal dilatation. There is no

evidence for a bowel obstruction. The appendix is normal. There are several

hyperdense small hematomas within the right rectus sheath which is

asymmetrically enlarged when compared to the left. The largest right rectus

sheath hematoma measures 3 x 1.8 x 1.6 cm. Minimal gas is noted, likely within

the posterior aspect of the left rectus sheath. This may be related to

injections. There is no definite free air. No lymphadenopathy is present. There

are no suspicious osseous lesions. The uterus is enlarged. Previous

contrast-enhanced CT showed suspected fibroids. There is colonic diverticulosis

without evidence for acute diverticulitis. 



IMPRESSION:  



1. Two small acute right rectus sheath hematomas which measure up to 3 x 1.8 x

1.6 cm. 



2. Trace gas likely within the posterior aspect of the left rectus sheath which

may be related to injections.



3. No bowel obstruction.



4. Suboptimal evaluation of the abdomen and pelvis given the lack of IV and oral

contrast.







Electronically signed by:  Brian Ladd M.D.

5/26/2018 5:16 PM



Dictated Date/Time:  5/26/2018 5:07 PM

## 2018-05-26 NOTE — EMERGENCY ROOM VISIT NOTE
History


Report prepared by Desiree:  Pricilla Julien


Under the Supervision of:  Dr. Daniel León D.O.


First contact with patient:  15:29


Chief Complaint:  PELVIC  PAIN


Stated Complaint:  SWELLING TO PELVIC REGION FROM SHOTS





History of Present Illness


The patient is a 63 year old female who presents to the Emergency Room with 

complaints of worsening RLQ abdominal pain starting 3 days ago. The patient 

started Lovenox shots 1 week ago. She started having severe pain 3 days ago 

around the injection site. The pain goes into her groin and the right thigh. 

She has not had a fever, but was diaphoretic this morning. She denies any 

rectal bleeding or back pain. Yesterday morning, she complained of left breast 

pain. She denies any vomiting or diarrhea. The patient lives in assisted 

living. She is on Coumadin. She was admitted with sepsis 2 months ago.





   Source of History:  patient, family


   Onset:  3 days ago


   Position:  abdomen (RLQ)


   Symptom Intensity:  severe


   Timing:  worsening


   Associated Symptoms:  + diaphoresis, + chest pain, No fevers, No vomiting, 

No back pain, No diarrhea





Review of Systems


See HPI for pertinent positives & negatives. A total of 10 systems reviewed and 

were otherwise negative.





Past Medical & Surgical


Medical Problems:


(1) Sepsis








Family History


No pertinent family history stated.





Social History


Smoking Status:  Never Smoker


Alcohol Use:  none


Drug Use:  none


Housing Status:  assisted living





Current/Historical Medications


Scheduled


Atorvastatin (Lipitor), 40 MG PO DAILY


Enoxaparin (Lovenox), 70 MG SQ Q12H


Furosemide (Lasix), 20 MG PO MWF


Gabapentin (Neurontin), 100 MG PO TID


Hydralazine Hcl (Apresoline), 50 MG PO Q8


Isosorbide Mononitrate Ext Rel (Imdur Ext Rel), 60 MG PO QAM


Methimazole (Methimazole), 10 MG PO DAILY


Metoprolol Tartrate (Lopressor) (Lopressor), 50 MG PO BID


Pantoprazole (Protonix), 40 MG PO DAILY


Warfarin Sodium (Coumadin), 8 MG PO DAILY UD


Warfarin Sodium (Coumadin), 6 MG PO UD





Scheduled PRN


Tramadol (Ultram), 25 MG PO Q4 PRN for Pain





Allergies


Coded Allergies:  


     Sulfa Antibiotics (Verified  Allergy, Unknown, swelling, 3/19/18)





Physical Exam


Vital Signs











  Date Time  Temp Pulse Resp B/P (MAP) Pulse Ox O2 Delivery O2 Flow Rate FiO2


 


5/26/18 18:36  62 16 115/76 98   


 


5/26/18 17:01  88 16 158/86 98 Room Air  


 


5/26/18 15:18 36.6 53 18 195/96 97 Room Air  











Physical Exam


GENERAL:  Patient is awake, alert, non anxious appearing. 


EYES: The conjunctivae are clear.  The pupils are round and reactive. 


EARS, NOSE, MOUTH AND THROAT: The nose is without any evidence of any 

deformity. Mucous membranes are moist tongue is midline 


NECK: The neck is nontender and supple.


RESPIRATORY: Normal respiratory effort is noted there is no evidence of 

wheezing rhonchi or rales


CARDIOVASCULAR:  Regular rate and rhythm noted there no murmurs rubs or gallops 

normal S1 normal S2 


GASTROINTESTINAL: The abdomen is mildly distended and soft. There was RLQ 

tenderness to palpation. There appears to be swelling in the right lower 

abdominal wall. No hernia appreciated. 


BACK:  No midline tenderness or or step-off noted range of motion in flexion 

extension as well as rotation no signs of muscle spasm noted


MUSCULOSKELETAL/EXTREMITIES: There is no evidence of gross deformity full range 

of motion is noted in the hips and shoulders


SKIN: There is trace pedal edema bilaterally with venous stasis changes noted. 


NEUROLOGIC:  Patient is at baseline according to daughter.





Medical Decision & Procedures


ER Provider


Diagnostic Interpretation:


X-ray results as stated below per interpretation by me and the radiologist. 

Radiology results as stated below per my review and radiologist interpretation:





CHEST ONE VIEW PORTABLE





CLINICAL HISTORY: EVALUATE ALTERED MENTAL STATUS/WEAKNESS    





COMPARISON STUDY:  Chest radiograph April 1, 2018.





FINDINGS: A nipple shadow projects over the left lower lung. Moderate


cardiomegaly is noted. There is moderate elevation of the right hemidiaphragm.


There is no evidence for pulmonary edema. No consolidation is identified. 





IMPRESSION:  





1. No acute cardiopulmonary findings.





2. Moderate cardiomegaly.





3. Moderate elevation of the right hemidiaphragm.





Electronically signed by:  Brian Ladd M.D.


5/26/2018 4:05 PM





Dictated Date/Time:  5/26/2018 4:04 PM








CT OF THE ABDOMEN AND PELVIS WITHOUT CONTRAST





CLINICAL HISTORY: RLQ pain, patient refusing contrast, recent Lovenox shots in


abdomen.    





COMPARISON STUDY:  CT of the abdomen and pelvis March 28, 2018. 





TECHNIQUE: Axial images of the abdomen and pelvis were obtained without IV


contrast. Images were reviewed in the axial, sagittal, and coronal planes.  A


dose lowering technique was utilized adhering to the principles of ALARA.








FINDINGS: The heart is moderately enlarged. Bilateral pleural effusions and


associated bibasilar opacity shown on exam of March 28, 2018 have resolved.


Evaluation of the abdomen and pelvis is suboptimal on this unenhanced exam. The


liver, spleen, adrenal glands and pancreas are unremarkable. There is no


hydronephrosis. There is no biliary or pancreatic ductal dilatation. There is no


evidence for a bowel obstruction. The appendix is normal. There are several


hyperdense small hematomas within the right rectus sheath which is


asymmetrically enlarged when compared to the left. The largest right rectus


sheath hematoma measures 3 x 1.8 x 1.6 cm. Minimal gas is noted, likely within


the posterior aspect of the left rectus sheath. This may be related to


injections. There is no definite free air. No lymphadenopathy is present. There


are no suspicious osseous lesions. The uterus is enlarged. Previous


contrast-enhanced CT showed suspected fibroids. There is colonic diverticulosis


without evidence for acute diverticulitis. 





IMPRESSION:  





1. Two small acute right rectus sheath hematomas which measure up to 3 x 1.8 x


1.6 cm. 





2. Trace gas likely within the posterior aspect of the left rectus sheath which


may be related to injections.





3. No bowel obstruction.





4. Suboptimal evaluation of the abdomen and pelvis given the lack of IV and oral


contrast.





Electronically signed by:  Brian Ladd M.D.


5/26/2018 5:16 PM





Dictated Date/Time:  5/26/2018 5:07 PM





Laboratory Results


5/26/18 16:21








Red Blood Count 3.79, Mean Corpuscular Volume 86.8, Mean Corpuscular Hemoglobin 

28.2, Mean Corpuscular Hemoglobin Concent 32.5, Mean Platelet Volume 10.1, 

Neutrophils (%) (Auto) 52.7, Lymphocytes (%) (Auto) 37.1, Monocytes (%) (Auto) 

7.3, Eosinophils (%) (Auto) 2.6, Basophils (%) (Auto) 0.3, Neutrophils # (Auto) 

3.69, Lymphocytes # (Auto) 2.60, Monocytes # (Auto) 0.51, Eosinophils # (Auto) 

0.18, Basophils # (Auto) 0.02





5/26/18 16:21

















Test


  5/26/18


15:47 5/26/18


16:21


 


Urine Color YELLOW  


 


Urine Appearance CLEAR (CLEAR)  


 


Urine pH 6.5 (4.5-7.5)  


 


Urine Specific Gravity


  1.017


(1.000-1.030) 


 


 


Urine Protein NEG (NEG)  


 


Urine Glucose (UA) NEG (NEG)  


 


Urine Ketones NEG (NEG)  


 


Urine Occult Blood NEG (NEG)  


 


Urine Nitrite NEG (NEG)  


 


Urine Bilirubin NEG (NEG)  


 


Urine Urobilinogen NEG (NEG)  


 


Urine Leukocyte Esterase TRACE (NEG)  


 


Urine WBC (Auto) 1-5 /hpf (0-5)  


 


Urine RBC (Auto) 0-4 /hpf (0-4)  


 


Urine Hyaline Casts (Auto) 0 /lpf (0-5)  


 


Urine Epithelial Cells (Auto)


  20-30 /lpf


(0-5) 


 


 


Urine Bacteria (Auto) NEG (NEG)  


 


White Blood Count


  


  7.00 K/uL


(4.8-10.8)


 


Red Blood Count


  


  3.79 M/uL


(4.2-5.4)


 


Hemoglobin


  


  10.7 g/dL


(12.0-16.0)


 


Hematocrit  32.9 % (37-47) 


 


Mean Corpuscular Volume


  


  86.8 fL


()


 


Mean Corpuscular Hemoglobin


  


  28.2 pg


(25-34)


 


Mean Corpuscular Hemoglobin


Concent 


  32.5 g/dl


(32-36)


 


Platelet Count


  


  186 K/uL


(130-400)


 


Mean Platelet Volume


  


  10.1 fL


(7.4-10.4)


 


Neutrophils (%) (Auto)  52.7 % 


 


Lymphocytes (%) (Auto)  37.1 % 


 


Monocytes (%) (Auto)  7.3 % 


 


Eosinophils (%) (Auto)  2.6 % 


 


Basophils (%) (Auto)  0.3 % 


 


Neutrophils # (Auto)


  


  3.69 K/uL


(1.4-6.5)


 


Lymphocytes # (Auto)


  


  2.60 K/uL


(1.2-3.4)


 


Monocytes # (Auto)


  


  0.51 K/uL


(0.11-0.59)


 


Eosinophils # (Auto)


  


  0.18 K/uL


(0-0.5)


 


Basophils # (Auto)


  


  0.02 K/uL


(0-0.2)


 


RDW Standard Deviation


  


  50.4 fL


(36.4-46.3)


 


RDW Coefficient of Variation


  


  15.9 %


(11.5-14.5)


 


Immature Granulocyte % (Auto)  0.0 % 


 


Immature Granulocyte # (Auto)


  


  0.00 K/uL


(0.00-0.02)


 


Prothrombin Time


  


  16.7 SECONDS


(9.0-12.0)


 


Prothromb Time International


Ratio 


  1.6 (0.9-1.1) 


 


 


Activated Partial


Thromboplast Time 


  47.2 SECONDS


(21.0-31.0)


 


Partial Thromboplastin Ratio  1.8 


 


Anion Gap


  


  5.0 mmol/L


(3-11)


 


Est Creatinine Clear Calc


Drug Dose 


  45.8 ml/min 


 


 


Estimated GFR (


American) 


  71.3 


 


 


Estimated GFR (Non-


American 


  61.5 


 


 


BUN/Creatinine Ratio  17.9 (10-20) 


 


Calcium Level


  


  8.4 mg/dl


(8.5-10.1)


 


Magnesium Level


  


  2.2 mg/dl


(1.8-2.4)


 


Total Bilirubin


  


  0.3 mg/dl


(0.2-1)


 


Direct Bilirubin


  


  0.1 mg/dl


(0-0.2)


 


Aspartate Amino Transf


(AST/SGOT) 


  27 U/L (15-37) 


 


 


Alanine Aminotransferase


(ALT/SGPT) 


  24 U/L (12-78) 


 


 


Alkaline Phosphatase


  


  160 U/L


()


 


Total Creatine Kinase


  


  351 U/L


()


 


Creatine Kinase MB


  


  5.2 ng/ml


(0.5-3.6)


 


Creatine Kinase MB Ratio  1.5 (0-3.0) 


 


Troponin I


  


  < 0.015 ng/ml


(0-0.045)


 


Total Protein


  


  8.1 gm/dl


(6.4-8.2)


 


Albumin


  


  3.3 gm/dl


(3.4-5.0)


 


Lipase


  


  346 U/L


()


 


Thyroid Stimulating Hormone


(TSH) 


  4.950 uIu/ml


(0.300-4.500)


 


Free Thyroxine


  


  0.34 ng/dl


(0.80-1.60)





Laboratory results per my review.





Medications Administered











 Medications


  (Trade)  Dose


 Ordered  Sig/Wilman


 Route  Start Time


 Stop Time Status Last Admin


Dose Admin


 


 Sodium Chloride  1,000 ml @ 


 999 mls/hr  Q1H1M STAT


 IV  5/26/18 15:37


 5/26/18 16:37 DC 5/26/18 16:11


999 MLS/HR











ECG Per My Interpretation


Indication:  chest pain


Rate (beats per minute):  45


Rhythm:  sinus bradycardia


Findings:  no ectopy, other (no acute ST segment abnormality)


Comparison ECG Date:  26-Mar-2018


Change:


Decreased rate, otherwise no change.





ED Course


1537: NSS 1,000 ml @ 999 mls/hr IV.





1541: The patient was evaluated in room A11B. A complete history and physical 

examination were performed. 





1755: I reevaluated the patient. I updated her and her daughter on the results. 





1804: Upon reevaluation, the patient is stable. I discussed the results and 

treatment plan with her and her daughter. They verbalized agreement of the 

treatment plan. She was discharged home.





Medical Decision


Prior records/ancillary studies reviewed.


Triage Nursing notes reviewed.


Additional history obtained from daughter.


The patient's history was concerning for abdominal pain. 





Differential diagnosis:


Etiologies such as appendicitis, diverticulitis, PUD, biliary pathology, UTI, 

pancreatitis, obstruction, mesenteric ischemia, aortic pathology, infections, 

inflammatory bowel disease, renal colic, as well as others were entertained. 





The patient is an 63-year-old female who presented to the emergency department 

for an evaluation of lower abdominal tenderness.  The patient was recently 

admitted to our facility and had a very complicated course.  She was diagnosed 

with atrial fibrillation as well as stroke.  She was taking Eliquis but because 

of insurance purposes this had to be D/C'd and the patient was recently started 

on Lovenox and Coumadin.  She has had lower abdominal tenderness.  On physical 

exam she appeared to have rectus sheath hematomas in the lower abdomen.  I 

discussed patient's laboratory and radiographic studies with her daughter.  

Currently she is receiving daily injections of Lovenox.  Her Coumadin level is 

not quite therapeutic yet.  I discussed this with the pharmacist in the 

[facility].  She does recommend continuing the Lovenox however being very 

aware of the injection site given the rectus sheath hematoma.  I discussed this 

with the daughter.  This appears to be the best plan at this time.  I did 

recommend that she have the Coumadin level rechecked as soon as possible to see 

if she is therapeutic.  Also recommended that she continue all other 

medications as prescribed and return to the emergency department immediately if 

symptoms change worsen or the need arises.





Medication Reconcilliation


Current Medication List:  was personally reviewed by me





Blood Pressure Screening


Patient's blood pressure:  Elevated blood pressure


Blood pressure disposition:  Elevated BP felt to be situational





Impression





 Primary Impression:  


 Abdominal pain


 Additional Impression:  


 Rectus sheath hematoma





Scribe Attestation


The scribe's documentation has been prepared under my direction and personally 

reviewed by me in its entirety. I confirm that the note above accurately 

reflects all work, treatment, procedures, and medical decision making performed 

by me.





Departure Information


Dispostion


Home / Self-Care





Referrals


[facility] ADINA FOSS (PCP)





Forms


HOME CARE DOCUMENTATION FORM,                                                 

               IMPORTANT VISIT INFORMATION, WORK / SCHOOL INSTRUCTIONS





Patient Instructions


ED Hematoma, My [facility]





Additional Instructions





Continue to use the Lovenox as prescribed until the Coumadin level is 

therapeutic.  Be cautious when giving the Lovenox injections especially in the 

lower abdomen because you already have 2 hematomas in both sides of your lower 

abdomen.  Follow-up with the family doctor soon as possible.  Have the Coumadin 

level rechecked as soon as possible.  Return the emergency department 

immediately symptoms change worsen or the need arises.





Problem Qualifiers








 Primary Impression:  


 Abdominal pain


 Abdominal location:  lower abdomen, unspecified  Qualified Codes:  R10.30 - 

Lower abdominal pain, unspecified


 Additional Impression:  


 Rectus sheath hematoma


 Encounter type:  initial encounter  Qualified Codes:  S30.1XXA - Contusion of 

abdominal wall, initial encounter

## 2018-05-26 NOTE — DIAGNOSTIC IMAGING REPORT
CHEST ONE VIEW PORTABLE



CLINICAL HISTORY: EVALUATE ALTERED MENTAL STATUS/WEAKNESS    



COMPARISON STUDY:  Chest radiograph April 1, 2018.



FINDINGS: A nipple shadow projects over the left lower lung. Moderate

cardiomegaly is noted. There is moderate elevation of the right hemidiaphragm.

There is no evidence for pulmonary edema. No consolidation is identified. 



IMPRESSION:  



1. No acute cardiopulmonary findings.



2. Moderate cardiomegaly.



3. Moderate elevation of the right hemidiaphragm.









Electronically signed by:  Brian Ladd M.D.

5/26/2018 4:05 PM



Dictated Date/Time:  5/26/2018 4:04 PM

## 2018-07-19 ENCOUNTER — HOSPITAL ENCOUNTER (OUTPATIENT)
Dept: HOSPITAL 45 - C.LAB1850 | Age: 63
Discharge: HOME | End: 2018-07-19
Attending: PHYSICIAN ASSISTANT
Payer: COMMERCIAL

## 2018-07-19 DIAGNOSIS — E05.90: Primary | ICD-10-CM

## 2018-07-24 ENCOUNTER — HOSPITAL ENCOUNTER (OUTPATIENT)
Dept: HOSPITAL 45 - C.LABWYN | Age: 63
Discharge: HOME | End: 2018-07-24
Attending: FAMILY MEDICINE
Payer: COMMERCIAL

## 2018-07-24 DIAGNOSIS — I48.91: Primary | ICD-10-CM

## 2018-07-24 DIAGNOSIS — Z79.01: ICD-10-CM

## 2018-07-24 LAB — INR PPP: 2.7 (ref 0.9–1.1)

## 2018-07-26 ENCOUNTER — HOSPITAL ENCOUNTER (OUTPATIENT)
Dept: HOSPITAL 45 - C.LABWYN | Age: 63
Discharge: HOME | End: 2018-07-26
Attending: PHYSICIAN ASSISTANT
Payer: COMMERCIAL

## 2018-07-26 DIAGNOSIS — E05.90: ICD-10-CM

## 2018-07-26 DIAGNOSIS — I48.91: Primary | ICD-10-CM

## 2018-07-31 ENCOUNTER — HOSPITAL ENCOUNTER (OUTPATIENT)
Dept: HOSPITAL 45 - C.LABWYN | Age: 63
Discharge: HOME | End: 2018-07-31
Attending: FAMILY MEDICINE
Payer: COMMERCIAL

## 2018-07-31 DIAGNOSIS — I48.91: Primary | ICD-10-CM

## 2018-07-31 DIAGNOSIS — Z79.01: ICD-10-CM

## 2018-07-31 LAB — INR PPP: 3.9 (ref 0.9–1.1)

## 2018-08-02 ENCOUNTER — HOSPITAL ENCOUNTER (OUTPATIENT)
Dept: HOSPITAL 45 - C.LABWYN | Age: 63
Discharge: HOME | End: 2018-08-02
Attending: INTERNAL MEDICINE
Payer: COMMERCIAL

## 2018-08-02 DIAGNOSIS — E11.9: ICD-10-CM

## 2018-08-02 DIAGNOSIS — E05.90: Primary | ICD-10-CM

## 2018-08-02 LAB
HBA1C MFR BLD: 5.5 % (ref 4.5–5.6)
T3FREE SERPL-MCNC: 4.22 PG/ML (ref 2.3–4.2)

## 2018-08-02 NOTE — CODING QUERY NO DIAGNOSIS
***Valid Physician Order Needed***



A valid physician order must be submitted in order to properly bill for the service(s) 
provided, including date of service(s), valid diagnosis, and physician signature. If these 
tests are done on a recurring basis the original physician order must be submitted in 
order to code and bill for the service(s) provided.



****Please fax us the original, signed physician order so that we may expedite billing to 
[phone]









DOS 7/24/18 ("See attached" missing)

* Prothrombin Time













Thank you  

Aleshia Hammer

[facility] Information Management

Phone:  [phone]

Fax:  [phone]

## 2018-08-06 LAB — TSI ACT/NOR SER: 684 % BASELINE (ref ?–140)

## 2018-08-08 NOTE — CODING QUERY NO DIAGNOSIS
***Valid Physician Order Needed***



A valid physician order must be submitted in order to properly bill for the service(s) 
provided, including date of service(s), valid diagnosis, and physician signature. If these 
tests are done on a recurring basis the original physician order must be submitted in 
order to code and bill for the service(s) provided.



****Please fax us the original, signed physician order so that we may expedite billing to 
[phone]







DOS 7/31/18

* Prothrombin Time Profile













Thank you  

Aleshia Hammer

[facility] Information Management

Phone:  [phone]

Fax:  [phone]

## 2018-10-11 NOTE — DIAGNOSTIC IMAGING REPORT
ABD/PELVIS WITHOUT FOR STONE



HISTORY:  62 years-old Female follow up c diff colitis.  Follow-up study in a

patient with colitis



COMPARISON: CT abdomen and pelvis 3/20/2018



TECHNIQUE: Multiple axial CT images of the abdomen and pelvis were obtained

without IV contrast. A dose lowering technique was used consistent with the

principals of ARIEL.



FINDINGS: 

Small to moderate bilateral pleural effusions with bibasilar dependent

consolidation is new from comparison CT. The inferior cardiac chambers are

enlarged, notably the right heart structures. Trace pericardial effusion

suspected.



Study is limited without the use of IV contrast and also secondary to patient

motion and positioning. Within the limitations of the study, the liver, spleen

and adrenal glands are unremarkable. Layering contrast is seen within the

gallbladder lumen suggesting vicarious excretion.



There is increased attenuation of the bilateral kidneys suggesting retention of

contrast. Hyperattenuating 7 x 8 mm lesion involves the superior pole left

kidney. No definite renal calculi or hydronephrosis. There are a few low

attenuating lesions of the left kidney measuring up to 5 mm, too small to

characterize however suggesting renal cysts. Linares catheter is noted within a

decompressed or bladder lumen. Air within the nondependent bladder is likely

from instrumentation. Indeterminate 1.6 cm cystic lesion about the left adnexum.



No aortic aneurysm. Left pelvic sidewall enlarged lymph nodes are again noted

measuring up to 2.5 x 1.2 cm. Left iliac adenopathy is also seen measuring up to

1.0 cm in short axis, unchanged. Moderate body wall edema with mild abdominal

pelvic ascites. An enteric tube terminates within the mid gastric lumen. No

definite bowel wall thickening appreciated on this noncontrast study. Colonic

diverticulosis without diverticulitis. Bones appear intact.



IMPRESSION: 

1. Limited study without the use of IV contrast. The previously described bowel

wall thickening is not well seen on today's study.

2.  Fluid overload manifested by small to moderate bilateral pleural effusions,

moderate diffuse body wall edema, and abdominal pelvic ascites.

3. Cortical medullary enhancement of the bilateral kidneys is compatible with

retained contrast from prior contrast enhanced study. These findings are most

compatible with decreased renal function and/or acute kidney injury. Vicarious

excretion of contrast is also noted within the gallbladder.

4. Indeterminate hyperattenuating 8 mm lesion of the super pole left kidney.

5. Bibasilar dependent consolidation favors compressive atelectasis.

6. Cardiomegaly.



The above report was generated using voice recognition software. It may contain

grammatical, syntax or spelling errors.







Electronically signed by:  Mike Cain M.D.

3/23/2018 10:18 AM



Dictated Date/Time:  3/23/2018 10:03 AM Patient

## 2020-12-07 NOTE — EMERGENCY ROOM VISIT NOTE
-- DO NOT REPLY / DO NOT REPLY ALL --  -- Message is from the [facility]--    PATIENT WANTS TO SET UP PROXY FOR THEIR CHILD IN [facility]    Team Member confirmed that parent has a [facility]    Names of children with a Virtual Visit, on a Wait List or in Virtual Care Program & : Merrick Joy 2001    Alternate Call Back #: [phone]    Route to the [facility] Registration pool: P [facility] REG POOL [[id number]]    \"I will route this to our Registration team, and they will process your request and give you a call back.\"     History


Report prepared by Desiree:  Paris Rosales


Under the Supervision of:  Dr. Doroteo Barclay M.D.


First contact with patient:  22:35


Chief Complaint:  OTHER COMPLAINT


Stated Complaint:  L SIDE PAIN, L LEG & KNEE SWELLING, NAUSEA, VOMIT





History of Present Illness


The patient is a 62 year old female who presents to the Emergency Room with 

complaints of worsening swelling to the left leg beginning yesterday. She 

states that she has had pain with this leg before, but denies ever being told 

that she had cellulitis or an infection in that leg. She denies any falls or 

injuries. The patient states that she was febrile at 102 today and states that 

she has also had the chills. Per nursing staff, the patient reports feeling 

nauseous and having abdominal pain. The patient states that she does not take 

any medications daily.





   Source of History:  patient, nursing staff


   Onset:  yesterday


   Position:  leg (left)


   Quality:  other (swelling)


   Timing:  worsening


   Associated Symptoms:  + fevers, + chills, + nausea, + abdominal pain





Review of Systems


See HPI for pertinent positives & negatives. A total of 10 systems reviewed and 

were otherwise negative.





Past Medical & Surgical


Denies any past medical history.





Family History


Reports no pertinent family history.





Social History


Marital Status:  single


Housing Status:  assisted living





Current/Historical Medications


No Active Prescriptions or Reported Meds





Allergies


Coded Allergies:  


     Sulfa Antibiotics (Verified  Allergy, Unknown, swelling, 3/19/18)





Physical Exam


Vital Signs











  Date Time  Temp Pulse Resp B/P (MAP) Pulse Ox O2 Delivery O2 Flow Rate FiO2


 


3/20/18 00:00 36.8 97 18 125/74 97 Room Air  


 


3/19/18 23:30  102 20 126/75 99 Room Air  


 


3/19/18 23:05  106  154/92 100 Room Air  


 


3/19/18 22:46     99 Room Air  


 


3/19/18 22:46  110      


 


3/19/18 22:34 37.6 112  159/102 98 Room Air  











Physical Exam


GENERAL: Patient is in no acute distress.


HEENT: No acute trauma, normocephalic atraumatic, mucous membranes moist, no 

nasal congestion, no scleral icterus.


NECK: No stridor, no adenopathy, no meningismus, trachea is midline.


LUNGS: Clear to auscultation bilaterally, no wheeze, no rhonchi, breath sounds 

equal.


HEART: Tachycardic with regular rhythm. No murmurs. 


ABDOMEN: Soft, nontender, bowel sounds positive, no hernias, no peritonitis.


EXTREMITIES: Significant swelling and erythema to the left leg from the ankle 

to the groin with erythema tracking onto the left abdominal wall. No crepitus. 

The thigh in particular is quite tender and firm. No drainage. Any movement of 

the leg causes pain. 


NEUROLOGIC: Oriented x 3, no acute motor or sensory deficits, no focal weakness.


SKIN: No rash, no jaundice, no diaphoresis.





Medical Decision & Procedures


ER Provider


Diagnostic Interpretation:


Radiology results as stated below per my review and [facility]. 





CT EXTREMITY LEFT LOWER:





Small amount of free fluid in the pelvis partially visualized. 


Multiple phleboliths in the pelvis. 


Fat stranding involving the left thigh and about the left knee, most 

prominently medially, anteriorly, and posteriorly. Subcutaneous fat stranding 

also noted in the medial aspects of the partially visualized right thigh. No 

abscess or soft tissue gas. 





No acute osseous abnormality. 





Radiology results as stated below per my review and radiologist interpretation:





Chest X-Ray:





No pneumonia or pneumothorax. Mild cardiomegaly. No mediastinal widening.





Laboratory Results


3/19/18 23:44








Red Blood Count 4.19, Mean Corpuscular Volume 82.8, Mean Corpuscular Hemoglobin 

28.2, Mean Corpuscular Hemoglobin Concent 34.0, Mean Platelet Volume 11.2, 

Neutrophils (%) (Auto) 72.5, Lymphocytes (%) (Auto) 19.7, Monocytes (%) (Auto) 

6.9, Eosinophils (%) (Auto) 0.7, Basophils (%) (Auto) 0.0, Neutrophils # (Auto) 

3.16, Lymphocytes # (Auto) 0.86, Monocytes # (Auto) 0.30, Eosinophils # (Auto) 

0.03, Basophils # (Auto) 0.00





3/19/18 22:45

















Test


  3/19/18


22:45 3/19/18


22:56 3/19/18


23:44 3/20/18


00:30


 


Prothrombin Time


  13.0 SECONDS


(9.0-12.0) 


  


  


 


 


Prothromb Time International


Ratio 1.2 (0.9-1.1) 


  


  


  


 


 


Activated Partial


Thromboplast Time 24.7 SECONDS


(21.0-31.0) 


  


  


 


 


Partial Thromboplastin Ratio 1.0    


 


Anion Gap


  12.0 mmol/L


(3-11) 


  


  


 


 


Est Creatinine Clear Calc


Drug Dose 49.2 ml/min 


  


  


  


 


 


Estimated GFR (


American) 76.4 


  


  


  


 


 


Estimated GFR (Non-


American 65.9 


  


  


  


 


 


BUN/Creatinine Ratio 24.7 (10-20)    


 


Calcium Level


  9.0 mg/dl


(8.5-10.1) 


  


  


 


 


Magnesium Level


  1.7 mg/dl


(1.8-2.4) 


  


  


 


 


Total Bilirubin


  2.0 mg/dl


(0.2-1) 


  


  


 


 


Aspartate Amino Transf


(AST/SGOT) 44 U/L (15-37) 


  


  


  


 


 


Alanine Aminotransferase


(ALT/SGPT) 32 U/L (12-78) 


  


  


  


 


 


Alkaline Phosphatase


  100 U/L


() 


  


  


 


 


Total Protein


  6.8 gm/dl


(6.4-8.2) 


  


  


 


 


Albumin


  2.8 gm/dl


(3.4-5.0) 


  


  


 


 


Globulin


  4.0 gm/dl


(2.5-4.0) 


  


  


 


 


Albumin/Globulin Ratio 0.7 (0.9-2)    


 


Chemistry Specimen Hemolysis     


 


Bedside Lactic Acid Venous


  


  5.21 mmol/L


(0.90-1.70) 


  


 


 


White Blood Count


  


  


  4.36 K/uL


(4.8-10.8) 


 


 


Red Blood Count


  


  


  4.19 M/uL


(4.2-5.4) 


 


 


Hemoglobin


  


  


  11.8 g/dL


(12.0-16.0) 


 


 


Hematocrit   34.7 % (37-47)  


 


Mean Corpuscular Volume


  


  


  82.8 fL


() 


 


 


Mean Corpuscular Hemoglobin


  


  


  28.2 pg


(25-34) 


 


 


Mean Corpuscular Hemoglobin


Concent 


  


  34.0 g/dl


(32-36) 


 


 


Platelet Count


  


  


  128 K/uL


(130-400) 


 


 


Mean Platelet Volume


  


  


  11.2 fL


(7.4-10.4) 


 


 


Neutrophils (%) (Auto)   72.5 %  


 


Lymphocytes (%) (Auto)   19.7 %  


 


Monocytes (%) (Auto)   6.9 %  


 


Eosinophils (%) (Auto)   0.7 %  


 


Basophils (%) (Auto)   0.0 %  


 


Neutrophils # (Auto)


  


  


  3.16 K/uL


(1.4-6.5) 


 


 


Lymphocytes # (Auto)


  


  


  0.86 K/uL


(1.2-3.4) 


 


 


Monocytes # (Auto)


  


  


  0.30 K/uL


(0.11-0.59) 


 


 


Eosinophils # (Auto)


  


  


  0.03 K/uL


(0-0.5) 


 


 


Basophils # (Auto)


  


  


  0.00 K/uL


(0-0.2) 


 


 


RDW Standard Deviation


  


  


  38.6 fL


(36.4-46.3) 


 


 


RDW Coefficient of Variation


  


  


  12.9 %


(11.5-14.5) 


 


 


Immature Granulocyte % (Auto)   0.2 %  


 


Immature Granulocyte # (Auto)


  


  


  0.01 K/uL


(0.00-0.02) 


 


 


Toxic Vacuolation   3+  


 


Dohle Bodies   1+  


 


Large Platelets   1+  


 


Echinocytes   1+  


 


Transferrin % Saturation     % (15-50) 


 


Test


  3/20/18


00:50 3/20/18


01:04 


  


 


 


Absolute Reticulocyte Count


  0.06 10^6/uL


(0.02-0.10) 


  


  


 


 


Percent Reticulocyte Count


  1.6 %


(0.5-2.0) 


  


  


 





Laboratory results reviewed by me.





Medications Administered











 Medications


  (Trade)  Dose


 Ordered  Sig/Wilman


 Route  Start Time


 Stop Time Status Last Admin


Dose Admin


 


 Sodium Chloride  1,000 ml @ 


 999 mls/hr  Q1H1M ONCE


 IV  3/19/18 22:41


 3/19/18 23:41 DC 3/19/18 22:50


999 MLS/HR


 


 Piperacillin Sod/


 Tazobactam Sod


  (Zosyn Iv)  4.5 gm  ONE  STAT


 IV  3/19/18 22:41


 3/19/18 22:46 DC 3/19/18 22:57


4.5 GM


 


 Clindamycin


 Phosphate


  (Cleocin 600mg/


 54ml D5W)  600 mg  ONE  ONCE


 IV  3/19/18 22:45


 3/19/18 22:46 DC 3/19/18 23:02


600 MG


 


 Acetaminophen


  (Tylenol Tab)  1,000 mg  NOW  STAT


 PO  3/19/18 22:41


 3/19/18 22:46 DC 3/19/18 22:51


1,000 MG


 


 Ketorolac


 Tromethamine


  (Toradol Inj)  30 mg  NOW  STAT


 IV  3/19/18 22:41


 3/19/18 22:46 DC 3/19/18 22:50


30 MG


 


 Ondansetron HCl


  (Zofran Inj)  4 mg  NOW  STAT


 IV  3/19/18 22:46


 3/19/18 22:50 DC 3/19/18 23:42


4 MG


 


 Daptomycin 300 mg/


 Syringe  6 ml @ 3


 mls/min  TODAY@2246


 IV  3/19/18 22:46


 3/19/18 23:15 DC 3/19/18 22:59


3 MLS/MIN


 


 Miscellaneous


 Information


  (Patient'S


 Allergy Info


 Needs Entered)  1 ea  Q30M


 N/A  3/19/18 23:00


 3/19/18 23:50 DC 3/19/18 23:42


1 EA


 


 Sodium Chloride  250 ml @ 


 999 mls/hr  Q16M ONCE


 IV  3/19/18 23:07


 3/19/18 23:22 DC 3/19/18 23:43


999 MLS/HR


 


 Sodium Chloride  250 ml @ 


 999 mls/hr  Q16M ONCE


 IV  3/19/18 23:07


 3/19/18 23:22 DC 3/19/18 23:44


999 MLS/HR


 


 Morphine Sulfate


  (MoRPHine


 SULFATE INJ)  2 mg  NOW  PRN


 IV  3/19/18 23:45


 4/2/18 23:44  3/19/18 23:46


2 MG


 


 Sodium Chloride  500 ml @ 


 999 mls/hr  Q31M STAT


 IV  3/20/18 00:13


 3/20/18 00:43 DC 3/20/18 00:19


999 MLS/HR











ECG Per My Interpretation


Indication:  weakness


Rate (beats per minute):  108


Rhythm:  sinus tachycardia


Findings:  other (LVH, biatrial enlargement, no ST elevation, no PVCs)





ED Course


2236: The patient was evaluated in room B2. A complete history and physical 

exam was performed.





2241: Ordered Toradol Inj 30 mg IV, Tylenol Tab 1,000 mg PO, Zosyn Iv 4.5 gm IV

, Sodium Chloride 1,000 ml @ 999 mls/hr IV. 





2245: Ordered Clindamycin Phosphate 600 mg IV. 





2246: Ordered Daptomycin 300 mg/Syringe 6 ml @ 3 mls/,in Protocol IV, Zofran 

Inj 4 mg IV.





2307: Ordered Sodium Chloride 250 ml @ 999 mls/hr IV, Sodium Chloride 250 ml @ 

999 mls/hr IV. 





2345: Ordered Morphine Sulfate 2 mg IV. 





0011: I spoke to the patient and updated her on her results. 





0013: Ordered Sodium Chloride 500 ml @ 999 mls/hr IV. 





0016: Upon reexamination the patient is resting. I discussed results and 

treatment plan with the patient. She verbalizes agreement and understanding. I 

spoke with Dr. Terry of the [facility] hospitalist service. We discussed the 

patient's results and findings. The patient will be evaluated for further 

management.





Medical Decision


The patient is a 62 year old female who presents to the ED with complaints of 

left leg swelling.  Differential diagnoses considered include necrotizing 

fasciitis, cellulitis, sepsis or bacteremia, dehydration, electrolyte imbalance

, pneumonia, and UTI.





Patient has a slightly low white count, platelet count and hemoglobin.  No 

significant electrolyte abnormality, no kidney failure.  There were a few mild 

liver enzyme elevations.  No coagulopathy.  Lactic acid level was quite 

elevated at over 5.  Blood cultures are pending.  Urinalysis result is pending.

  CT of the left thigh shows cellulitis, no evidence for gas or necrotizing 

fasciitis.  Chest film did not show pneumonia or CHF.





The patient was aggressively managed.  She received over 30 cc of saline per 

kilogram.  She received IV morphine for pain, she received IV Toradol.  She was 

given oral Tylenol.  Patient received IV Zosyn, IV daptomycin and IV 

clindamycin.  Patient received IV Zofran.





The patient's heart rate has improved, she is more comfortable.  She will 

require a hospital stay as I do believe she is septic from this cellulitis.  I 

spoke with the patient and her family.  I talked with case management.  I 

discussed the case with the on-call hospitalist.





Medication Reconcilliation


Current Medication List:  was personally reviewed by me





Blood Pressure Screening


Patient's blood pressure:  Elevated blood pressure


will be monitored by hospitalist





Consults


Time Called:  2300


Consulting Physician:  Dr. Kely Hernandez


Returned Call:  [phone]


Discussed the patient's case. The patient will be evaluated for further 

management.





Impression





 Primary Impression:  


 Sepsis


 Additional Impressions:  


 Left leg cellulitis


 Left leg pain





Critical Care


I have personally spent greater than 35 minutes of critical care time in the 

direct management of this patient.  This includes bedside care, interpretation 

of diagnostic studies, and testing, discussion with consultants, patient, and 

family members, and other required patient management activities.  This 35 

minutes is in excess of all separately billable procedures.





Scribe Attestation


The scribe's documentation has been prepared under my direction and personally 

reviewed by me in its entirety. I confirm that the note above accurately 

reflects all work, treatment, procedures, and medical decision making performed 

by me.





Departure Information


Dispostion


Being Evaluated By Hospitalist





Prescriptions





No Active Prescriptions or Reported Meds





Referrals


No Doctor, Assigned (PCP)





Patient Instructions


My [facility]





Problem Qualifiers
